# Patient Record
Sex: FEMALE | Race: WHITE | NOT HISPANIC OR LATINO | Employment: OTHER | URBAN - METROPOLITAN AREA
[De-identification: names, ages, dates, MRNs, and addresses within clinical notes are randomized per-mention and may not be internally consistent; named-entity substitution may affect disease eponyms.]

---

## 2017-01-02 ENCOUNTER — TELEPHONE (OUTPATIENT)
Dept: FAMILY MEDICINE | Facility: CLINIC | Age: 62
End: 2017-01-02

## 2017-01-02 DIAGNOSIS — R30.0 DYSURIA: ICD-10-CM

## 2017-01-02 DIAGNOSIS — R31.0 GROSS HEMATURIA: ICD-10-CM

## 2017-01-02 DIAGNOSIS — R30.0 DYSURIA: Primary | ICD-10-CM

## 2017-01-02 LAB
ALBUMIN UR-MCNC: >=300 MG/DL
APPEARANCE UR: ABNORMAL
BILIRUB UR QL STRIP: NEGATIVE
COLOR UR AUTO: ABNORMAL
GLUCOSE UR STRIP-MCNC: NEGATIVE MG/DL
HGB UR QL STRIP: ABNORMAL
KETONES UR STRIP-MCNC: NEGATIVE MG/DL
LEUKOCYTE ESTERASE UR QL STRIP: ABNORMAL
NITRATE UR QL: NEGATIVE
PH UR STRIP: 5.5 PH (ref 5–7)
RBC #/AREA URNS AUTO: >100 /HPF (ref 0–2)
SP GR UR STRIP: 1.01 (ref 1–1.03)
URN SPEC COLLECT METH UR: ABNORMAL
UROBILINOGEN UR STRIP-ACNC: 0.2 EU/DL (ref 0.2–1)
WBC #/AREA URNS AUTO: ABNORMAL /HPF (ref 0–2)

## 2017-01-02 PROCEDURE — 87088 URINE BACTERIA CULTURE: CPT | Mod: 90 | Performed by: FAMILY MEDICINE

## 2017-01-02 PROCEDURE — 87186 SC STD MICRODIL/AGAR DIL: CPT | Mod: 90 | Performed by: FAMILY MEDICINE

## 2017-01-02 PROCEDURE — 99000 SPECIMEN HANDLING OFFICE-LAB: CPT | Performed by: FAMILY MEDICINE

## 2017-01-02 PROCEDURE — 87086 URINE CULTURE/COLONY COUNT: CPT | Performed by: FAMILY MEDICINE

## 2017-01-02 PROCEDURE — 81001 URINALYSIS AUTO W/SCOPE: CPT | Performed by: FAMILY MEDICINE

## 2017-01-02 RX ORDER — CIPROFLOXACIN 500 MG/1
500 TABLET, FILM COATED ORAL 2 TIMES DAILY
Qty: 20 TABLET | Refills: 0 | Status: SHIPPED | OUTPATIENT
Start: 2017-01-02 | End: 2017-01-12

## 2017-01-02 NOTE — TELEPHONE ENCOUNTER
Huddle with TS. Advised not many white blood cells in urine to possibly note infection, but large amount of hematuria which could represent a kidney stone.    Advised that pt noted this seemed to be bladder related per triage call previously but triage can ask pt upon call back. OK to order CT abd pelvis w/wo contrast to r/o stones if desired.    Left message with female for pt to call back and speak to triage nurse.  Lizeth Fuentes RN

## 2017-01-02 NOTE — TELEPHONE ENCOUNTER
URINARY TRACT SYMPTOMS     Onset: this     Description:   Painful urination (Dysuria): YES  Blood in urine (Hematuria): YES  Delay in urine (Hesitency): no     Intensity: moderate    Progression of Symptoms:  worsening    Accompanying Signs & Symptoms:  Fever/chills: no   Flank pain no   Nausea and vomiting: no   Any vaginal symptoms: none  Abdominal/Pelvic Pain: YES, slight with pressure   History:   History of frequent UTI's: YES  History of kidney stones: no   Sexually Active: YES  Possibility of pregnancy: No    Precipitating factors:   Possibly due to sleeping in and holding urine         Therapies Tried and outcome: nothing    UA/UC ordered, lab appt made.  Lizeth Fuentes RN

## 2017-01-04 DIAGNOSIS — R30.0 DYSURIA: Primary | ICD-10-CM

## 2017-01-04 LAB
BACTERIA SPEC CULT: ABNORMAL
MICRO REPORT STATUS: ABNORMAL
MICROORGANISM SPEC CULT: ABNORMAL
SPECIMEN SOURCE: ABNORMAL

## 2017-01-30 DIAGNOSIS — R30.0 DYSURIA: ICD-10-CM

## 2017-01-30 LAB
ALBUMIN UR-MCNC: NEGATIVE MG/DL
APPEARANCE UR: CLEAR
BILIRUB UR QL STRIP: NEGATIVE
COLOR UR AUTO: YELLOW
GLUCOSE UR STRIP-MCNC: NEGATIVE MG/DL
HGB UR QL STRIP: NEGATIVE
KETONES UR STRIP-MCNC: NEGATIVE MG/DL
LEUKOCYTE ESTERASE UR QL STRIP: NEGATIVE
NITRATE UR QL: NEGATIVE
PH UR STRIP: 5.5 PH (ref 5–7)
SP GR UR STRIP: <=1.005 (ref 1–1.03)
URN SPEC COLLECT METH UR: NORMAL
UROBILINOGEN UR STRIP-ACNC: 0.2 EU/DL (ref 0.2–1)

## 2017-01-30 PROCEDURE — 81003 URINALYSIS AUTO W/O SCOPE: CPT | Performed by: FAMILY MEDICINE

## 2017-01-30 PROCEDURE — 87086 URINE CULTURE/COLONY COUNT: CPT | Performed by: FAMILY MEDICINE

## 2017-01-31 LAB
BACTERIA SPEC CULT: NO GROWTH
MICRO REPORT STATUS: NORMAL
SPECIMEN SOURCE: NORMAL

## 2017-02-22 ENCOUNTER — TELEPHONE (OUTPATIENT)
Dept: NURSING | Facility: CLINIC | Age: 62
End: 2017-02-22

## 2017-02-22 ENCOUNTER — TELEPHONE (OUTPATIENT)
Dept: FAMILY MEDICINE | Facility: CLINIC | Age: 62
End: 2017-02-22

## 2017-02-22 DIAGNOSIS — R30.0 DYSURIA: ICD-10-CM

## 2017-02-22 DIAGNOSIS — R82.90 NONSPECIFIC FINDING ON EXAMINATION OF URINE: Primary | ICD-10-CM

## 2017-02-22 DIAGNOSIS — R30.0 DYSURIA: Primary | ICD-10-CM

## 2017-02-22 LAB
ALBUMIN UR-MCNC: 100 MG/DL
APPEARANCE UR: ABNORMAL
BACTERIA #/AREA URNS HPF: ABNORMAL /HPF
BILIRUB UR QL STRIP: NEGATIVE
COLOR UR AUTO: ABNORMAL
GLUCOSE UR STRIP-MCNC: NEGATIVE MG/DL
HGB UR QL STRIP: ABNORMAL
KETONES UR STRIP-MCNC: NEGATIVE MG/DL
LEUKOCYTE ESTERASE UR QL STRIP: ABNORMAL
NITRATE UR QL: NEGATIVE
PH UR STRIP: 7 PH (ref 5–7)
RBC #/AREA URNS AUTO: >100 /HPF (ref 0–2)
SP GR UR STRIP: 1.01 (ref 1–1.03)
URN SPEC COLLECT METH UR: ABNORMAL
UROBILINOGEN UR STRIP-ACNC: 0.2 EU/DL (ref 0.2–1)
WBC #/AREA URNS AUTO: ABNORMAL /HPF (ref 0–2)

## 2017-02-22 PROCEDURE — 87086 URINE CULTURE/COLONY COUNT: CPT | Performed by: FAMILY MEDICINE

## 2017-02-22 PROCEDURE — 87186 SC STD MICRODIL/AGAR DIL: CPT | Performed by: FAMILY MEDICINE

## 2017-02-22 PROCEDURE — 87088 URINE BACTERIA CULTURE: CPT | Performed by: FAMILY MEDICINE

## 2017-02-22 PROCEDURE — 81001 URINALYSIS AUTO W/SCOPE: CPT | Performed by: FAMILY MEDICINE

## 2017-02-22 NOTE — TELEPHONE ENCOUNTER
URINARY TRACT SYMPTOMS     Onset: today    Description:   Painful urination (Dysuria): YES  Blood in urine (Hematuria): YES  Delay in urine (Hesitency): YES    Intensity: moderate    Progression of Symptoms:  worsening    Accompanying Signs & Symptoms:  Fever/chills: no   Flank pain no   Nausea and vomiting: no   Any vaginal symptoms: none  Abdominal/Pelvic Pain: no    History:   History of frequent UTI's: YES  History of kidney stones: no   Sexually Active: no   Possibility of pregnancy: No    Precipitating factors:   none         Therapies Tried and outcome:  Increase fluid intake  UA ordered and labs ordered    Mary Mckeon RN    [facility]

## 2017-02-22 NOTE — TELEPHONE ENCOUNTER
Reason for call:  Patient reporting a symptom    Symptom or request: poss UTI    Duration (how long have symptoms been present): since noon today    Have you been treated for this before? Yes    Additional comments: Pain and frequency started at noon today and now she has pink tinged urine.  Last time she was in for UTI last month her urine was bright red.    Phone Number patient can be reached at:  Home number on file [phone] (home)    Best Time:      Can we leave a detailed message on this number:  YES    Call taken on 2/22/2017 at 1:40 PM by Judi Salcido

## 2017-02-23 ENCOUNTER — TELEPHONE (OUTPATIENT)
Dept: FAMILY MEDICINE | Facility: CLINIC | Age: 62
End: 2017-02-23

## 2017-02-23 DIAGNOSIS — N39.0 URINARY TRACT INFECTION: Primary | ICD-10-CM

## 2017-02-23 RX ORDER — NITROFURANTOIN 25; 75 MG/1; MG/1
100 CAPSULE ORAL 2 TIMES DAILY
Qty: 14 CAPSULE | Refills: 0 | Status: SHIPPED | OUTPATIENT
Start: 2017-02-23 | End: 2017-04-08

## 2017-02-23 NOTE — TELEPHONE ENCOUNTER
"Call Type: Triage Call    Presenting Problem: \"I'm calling about urine results they did in  clinic today.\"  Triage Note:  Guideline Title: Information Only Call; No Symptom Triage (Adult)  Recommended Disposition: Call Provider When Office is Open  Original Inclination: Wanted to speak with a nurse  Override Disposition:  Intended Action: Follow advice given  Physician Contacted: No  Requesting information and provider is best resource; no triage required. ?  YES  Requesting regular office appointment ? NO  Sign(s) or symptom(s) associated with a diagnosed condition or with a new illness  ? NO  Requesting information about provider, services or community resources ? NO  Call back to complete assessment/clarification of information from prior caller to  complete triage ? NO  Physician Instructions:  Care Advice:  "

## 2017-02-23 NOTE — TELEPHONE ENCOUNTER
Pt stated she is miserable and would like to know what the urine turned out     Advised pt RN will discuss with the provider on plan     Best # [phone] ok ES     Huddled with MD BARBY - rubén for macrobid 100 mg BID for 7 days   Advised pt on the information above and to push fluids     Patient stated an understanding and agreed with plan.      Janet Seals RN, BSN  [facility]

## 2017-02-28 ENCOUNTER — TELEPHONE (OUTPATIENT)
Dept: FAMILY MEDICINE | Facility: CLINIC | Age: 62
End: 2017-02-28

## 2017-02-28 NOTE — TELEPHONE ENCOUNTER
URINARY TRACT SYMPTOMS     Onset: ongoing    Description:   Painful urination (Dysuria): YES  Blood in urine (Hematuria): no     Delay in urine (Hesitency): no     Intensity: moderate    Progression of Symptoms:  worsening    Accompanying Signs & Symptoms:  Fever/chills: no   Flank pain no   Nausea and vomiting: no   Any vaginal symptoms: none  Abdominal/Pelvic Pain: YES   History:   History of frequent UTI's: YES  History of kidney stones: no   Sexually Active: no   Possibility of pregnancy: No    Precipitating factors:   none         Therapies Tried and outcome: on macrobid which culture said is appropriate.  Will finish abx and call if not improved.    Mary Mckeon RN    [facility]

## 2017-02-28 NOTE — TELEPHONE ENCOUNTER
.  Name of caller:   Jahaira  Relationship to pt:  self  Reason for call:   Pt calling she is still having problems with her bladder infection  Best phone number to reach pt at is:   [phone]  Ok to leave a message with medical info?   yes  Pharmacy preferred (if calling for a refill)   [facility] [place]

## 2017-04-07 NOTE — PROGRESS NOTES
SUBJECTIVE:                                                    Kristin Jimenez is a 61 year old female who presents to clinic today for the following health issues:    URINARY TRACT SYMPTOMS     Onset: x6 days     Description:   Painful urination (Dysuria): YES- pressure/chills  Blood in urine (Hematuria): no - some red color maybe from cranberry capsules   Delay in urine (Hesitency): YES    Intensity: moderate    Progression of Symptoms:  Staying the same with capryl and d-man nos capsules ( urinary tract healer with cran action) - if I don't take these symptoms worsen     Accompanying Signs & Symptoms:  Fever/chills: YES- chills and low temp   Flank pain YES- hips and back   Nausea and vomiting: no   Any vaginal symptoms: burning slightly   Abdominal/Pelvic Pain: no    History:   History of frequent UTI's: YES  History of kidney stones: no   Sexually Active: YES  Possibility of pregnancy: No    Precipitating factors:   Unsure          Therapies Tried and outcome: capryl and d-man nos capsules - keeping symptoms moderate     Recent frequent UTIs. Had one in Jan and one in Feb. Took Cipro for Jan UTI and Macrobid for Feb UTI.   States she had a hard time tolerating Macrobid.    No history of kidney infections    Denies nausea, vomiting, or high fevers. No flank pain.  States that her hips hurt when she gets UTIs.    Lower abdominal discomfort    Frequency, urgency, dysuria, some hematuria    No chronic or episodic diarrhea  Sometimes washes vaginal area with soap  Wonders if intercourse may be a trigger    Problem list and histories reviewed & adjusted, as indicated.  Additional history: as documented    Patient Active Problem List   Diagnosis     Rosacea     Ulcerative colitis (H)     Major depressive disorder, single episode, moderate (H)     Osteopenia     Hyperlipidemia LDL goal <130     Health Care Home     Advanced directives, counseling/discussion     OA (osteoarthritis)     Family history of colon  cancer     GERD (gastroesophageal reflux disease)     Colon polyp     Past Surgical History:   Procedure Laterality Date     COLONOSCOPY  2012     HC COLONOSCOPY THRU STOMA, DIAGNOSTIC  2012,     colon polyp, diverticulosis, hemorrhoids - due ? Yrs     HC REVISE MEDIAN N/CARPAL TUNNEL SURG      bilateral - dr acuna     SURGICAL HISTORY OF -       Dr Perales - Lt hip CARMELINA     SURGICAL HISTORY OF -       Dr Perales - Rt Hip CARMELINA       Social History   Substance Use Topics     Smoking status: Never Smoker     Smokeless tobacco: Never Used     Alcohol use 1.0 oz/week     2 Standard drinks or equivalent per week      Comment: 2 glass of wine per week     Family History   Problem Relation Age of Onset     CANCER Father       Lung CA - SMOKER     Lipids Sister      Lipids Brother      CEREBROVASCULAR DISEASE Maternal Grandmother      C.A.D. Maternal Grandfather      Prostate Cancer Maternal Grandfather      Alzheimer Disease Paternal Grandmother      Prostate Cancer Paternal Grandfather      Eye Disorder Mother      glaucoma     Cancer - colorectal Mother      AGE 71 WITH METS liver cancer.  Oct. 2006     Eye Disorder Sister      histoplasmosis     Eye Disorder Brother      histoplasmosis         Current Outpatient Prescriptions   Medication Sig Dispense Refill     ciprofloxacin (CIPRO) 500 MG tablet Take 1 tablet (500 mg) by mouth 2 times daily 14 tablet 0     FLUoxetine (PROZAC) 20 MG capsule Take 3 capsules (60 mg) by mouth daily 270 capsule 3     omeprazole (PRILOSEC) 20 MG capsule TAKE ONE CAPSULE BY MOUTH ONCE DAILY 90 capsule 3     metroNIDAZOLE (METROGEL) 0.75 % gel Apply topically 2 times daily 135 g 3     Allergies   Allergen Reactions     Sulfa Drugs      Rash       Reviewed and updated as needed this visit by clinical staff       Reviewed and updated as needed this visit by Provider         ROS:  Constitutional, HEENT, cardiovascular, pulmonary, gi and gu systems  "are negative, except as otherwise noted.    OBJECTIVE:                                                    /84  Pulse 73  Temp 98.9  F (37.2  C) (Oral)  Ht 5' 3\" (1.6 m)  Wt 191 lb (86.6 kg)  LMP 10/20/2003  SpO2 99%  BMI 33.83 kg/m2  Body mass index is 33.83 kg/(m^2).  GENERAL: healthy, alert and no distress  RESP: lungs clear to auscultation - no rales, rhonchi or wheezes  CV: regular rate and rhythm, normal S1 S2, no S3 or S4, no murmur, click or rub, no peripheral edema and peripheral pulses strong  ABDOMEN: tenderness suprapubic and bowel sounds normal  BACK: no CVA tenderness, no paralumbar tenderness    Diagnostic Test Results:  Results for orders placed or performed in visit on 04/08/17 (from the past 24 hour(s))   *UA reflex to Microscopic and Culture ([place] and [facility] (except [place] and [place])   Result Value Ref Range    Color Urine Yellow     Appearance Urine Clear     Glucose Urine Negative NEG mg/dL    Bilirubin Urine Negative NEG    Ketones Urine Negative NEG mg/dL    Specific Gravity Urine 1.015 1.003 - 1.035    Blood Urine Large (A) NEG    pH Urine 8.5 (H) 5.0 - 7.0 pH    Protein Albumin Urine Trace (A) NEG mg/dL    Urobilinogen Urine 0.2 0.2 - 1.0 EU/dL    Nitrite Urine Negative NEG    Leukocyte Esterase Urine Small (A) NEG    Source Midstream Urine    Urine Microscopic   Result Value Ref Range    WBC Urine 10-25 (A) 0 - 2 /HPF    RBC Urine  (A) 0 - 2 /HPF    Squamous Epithelial /LPF Urine Few FEW /LPF    Bacteria Urine Few (A) NEG /HPF        ASSESSMENT/PLAN:                                                      1. Urinary tract infection with hematuria, site unspecified  Recurrent UTIs. Will start Cipro. Await culture results. Push fluids. Consider prophylactic antibiotic if UTIs continue to occur. Reviewed ways to prevent UTIs, including urinating before and after intercourse, and wiping from front to back.    2. Urinary urgency  - *UA reflex to Microscopic and " Culture ([place] and [facility] (except [place] and [place])  - Urine Microscopic  - ciprofloxacin (CIPRO) 500 MG tablet; Take 1 tablet (500 mg) by mouth 2 times daily  Dispense: 14 tablet; Refill: 0    3. Nonspecific finding on examination of urine  - Urine Culture Aerobic Bacterial    See Patient Instructions    Kaci Zheng PA-C  [facility]

## 2017-04-08 ENCOUNTER — OFFICE VISIT (OUTPATIENT)
Dept: FAMILY MEDICINE | Facility: CLINIC | Age: 62
End: 2017-04-08
Payer: COMMERCIAL

## 2017-04-08 VITALS
TEMPERATURE: 98.9 F | OXYGEN SATURATION: 99 % | BODY MASS INDEX: 33.84 KG/M2 | SYSTOLIC BLOOD PRESSURE: 108 MMHG | WEIGHT: 191 LBS | DIASTOLIC BLOOD PRESSURE: 84 MMHG | HEART RATE: 73 BPM | HEIGHT: 63 IN

## 2017-04-08 DIAGNOSIS — N39.0 URINARY TRACT INFECTION WITH HEMATURIA, SITE UNSPECIFIED: Primary | ICD-10-CM

## 2017-04-08 DIAGNOSIS — R39.15 URINARY URGENCY: ICD-10-CM

## 2017-04-08 DIAGNOSIS — R31.9 URINARY TRACT INFECTION WITH HEMATURIA, SITE UNSPECIFIED: Primary | ICD-10-CM

## 2017-04-08 DIAGNOSIS — R82.90 NONSPECIFIC FINDING ON EXAMINATION OF URINE: ICD-10-CM

## 2017-04-08 LAB
ALBUMIN UR-MCNC: ABNORMAL MG/DL
APPEARANCE UR: CLEAR
BACTERIA #/AREA URNS HPF: ABNORMAL /HPF
BILIRUB UR QL STRIP: NEGATIVE
COLOR UR AUTO: YELLOW
GLUCOSE UR STRIP-MCNC: NEGATIVE MG/DL
HGB UR QL STRIP: ABNORMAL
KETONES UR STRIP-MCNC: NEGATIVE MG/DL
LEUKOCYTE ESTERASE UR QL STRIP: ABNORMAL
NITRATE UR QL: NEGATIVE
NON-SQ EPI CELLS #/AREA URNS LPF: ABNORMAL /LPF
PH UR STRIP: 8.5 PH (ref 5–7)
RBC #/AREA URNS AUTO: ABNORMAL /HPF (ref 0–2)
SP GR UR STRIP: 1.01 (ref 1–1.03)
URN SPEC COLLECT METH UR: ABNORMAL
UROBILINOGEN UR STRIP-ACNC: 0.2 EU/DL (ref 0.2–1)
WBC #/AREA URNS AUTO: ABNORMAL /HPF (ref 0–2)

## 2017-04-08 PROCEDURE — 87086 URINE CULTURE/COLONY COUNT: CPT | Performed by: PHYSICIAN ASSISTANT

## 2017-04-08 PROCEDURE — 81001 URINALYSIS AUTO W/SCOPE: CPT | Performed by: PHYSICIAN ASSISTANT

## 2017-04-08 PROCEDURE — 99213 OFFICE O/P EST LOW 20 MIN: CPT | Performed by: PHYSICIAN ASSISTANT

## 2017-04-08 RX ORDER — CIPROFLOXACIN 500 MG/1
500 TABLET, FILM COATED ORAL 2 TIMES DAILY
Qty: 14 TABLET | Refills: 0 | Status: SHIPPED | OUTPATIENT
Start: 2017-04-08 | End: 2017-05-06

## 2017-04-08 NOTE — MR AVS SNAPSHOT
"              After Visit Summary   4/8/2017    Kristin Jimenez    MRN: [id number]           Patient Information     Date Of Birth          1955        Visit Information        Provider Department      4/8/2017 9:00 AM Kaci Zheng PA-C [facility]        Today's Diagnoses     Urinary urgency    -  1    Nonspecific finding on examination of urine           Follow-ups after your visit        Who to contact     If you have questions or need follow up information about today's clinic visit or your schedule please contact [facility] directly at [phone].  Normal or non-critical lab and imaging results will be communicated to you by mywaveshart, letter or phone within 4 business days after the clinic has received the results. If you do not hear from us within 7 days, please contact the clinic through [employer] or phone. If you have a critical or abnormal lab result, we will notify you by phone as soon as possible.  Submit refill requests through [employer] or call your pharmacy and they will forward the refill request to us. Please allow 3 business days for your refill to be completed.          Additional Information About Your Visit        MyChart Information     [employer] gives you secure access to your electronic health record. If you see a primary care provider, you can also send messages to your care team and make appointments. If you have questions, please call your primary care clinic.  If you do not have a primary care provider, please call [phone] and they will assist you.        Care EveryWhere ID     This is your Care EveryWhere ID. This could be used by other organizations to access your [facility] medical records  [id number]        Your Vitals Were     Pulse Temperature Height Last Period Pulse Oximetry BMI (Body Mass Index)    73 98.9  F (37.2  C) (Oral) 5' 3\" (1.6 m) 10/20/2003 99% 33.83 kg/m2       Blood Pressure from Last 3 Encounters:   04/08/17 108/84 "   12/26/16 110/72   12/12/16 112/74    Weight from Last 3 Encounters:   04/08/17 191 lb (86.6 kg)   12/26/16 186 lb (84.4 kg)   12/12/16 187 lb (84.8 kg)              We Performed the Following     *UA reflex to Microscopic and Culture ([place] and [facility] (except [place] and [place])     Urine Culture Aerobic Bacterial     Urine Microscopic          Today's Medication Changes          These changes are accurate as of: 4/8/17  9:27 AM.  If you have any questions, ask your nurse or doctor.               Start taking these medicines.        Dose/Directions    ciprofloxacin 500 MG tablet   Commonly known as:  CIPRO   Used for:  Urinary urgency   Started by:  Kaci Zheng PA-C        Dose:  500 mg   Take 1 tablet (500 mg) by mouth 2 times daily   Quantity:  14 tablet   Refills:  0            Where to get your medicines      These medications were sent to [facility] [place] [address]     Phone:  [phone]     ciprofloxacin 500 MG tablet                Primary Care Provider Office Phone # Fax #    Louis Nixon [phone]       [place] [id number]        Thank you!     Thank you for choosing [facility]  for your care. Our goal is always to provide you with excellent care. Hearing back from our patients is one way we can continue to improve our services. Please take a few minutes to complete the written survey that you may receive in the mail after your visit with us. Thank you!             Your Updated Medication List - Protect others around you: Learn how to safely use, store and throw away your medicines at www.disposemymeds.org.          This list is accurate as of: 4/8/17  9:27 AM.  Always use your most recent med list.                   Brand Name Dispense Instructions for use    ciprofloxacin 500 MG tablet    CIPRO    14  tablet    Take 1 tablet (500 mg) by mouth 2 times daily       FLUoxetine 20 MG capsule    PROzac    270 capsule    Take 3 capsules (60 mg) by mouth daily       metroNIDAZOLE 0.75 % topical gel    METROGEL    135 g    Apply topically 2 times daily       omeprazole 20 MG CR capsule    priLOSEC    90 capsule    TAKE ONE CAPSULE BY MOUTH ONCE DAILY

## 2017-04-08 NOTE — ASSESSMENT & PLAN NOTE
Advance Care Planning 4/8/2017: ACP Review of Chart / Resources Provided:  Reviewed chart for advance care plan.  Kristin Jimenez has no plan or code status on file. Discussed available resources . Patient declined   Added by Pao Zavala

## 2017-04-08 NOTE — NURSING NOTE
"Chief Complaint   Patient presents with     Vaginal Problem       Initial /84  Pulse 73  Temp 98.9  F (37.2  C) (Oral)  Ht 5' 3\" (1.6 m)  Wt 191 lb (86.6 kg)  LMP 10/20/2003  SpO2 99%  BMI 33.83 kg/m2 Estimated body mass index is 33.83 kg/(m^2) as calculated from the following:    Height as of this encounter: 5' 3\" (1.6 m).    Weight as of this encounter: 191 lb (86.6 kg).  Medication Reconciliation: complete   Pao Zavala Medical Assistant      "

## 2017-04-10 LAB
BACTERIA SPEC CULT: NORMAL
MICRO REPORT STATUS: NORMAL
SPECIMEN SOURCE: NORMAL

## 2017-04-12 NOTE — PROGRESS NOTES
Dear Jahaira,    Your urine culture grew out some mixed bacteria. This often indicates contamination with normal urogenital bacteria. Based on your symptoms, I would advise completing the full course of the antibiotic. If you have any persistent symptoms after finishing the antibiotic, a follow-up visit would be advised.    Please contact the clinic if you have additional questions.  Thank you.    Sincerely,    Kaci Zheng PA-C

## 2017-05-06 ENCOUNTER — OFFICE VISIT (OUTPATIENT)
Dept: FAMILY MEDICINE | Facility: CLINIC | Age: 62
End: 2017-05-06
Payer: COMMERCIAL

## 2017-05-06 VITALS
BODY MASS INDEX: 34.02 KG/M2 | HEART RATE: 81 BPM | WEIGHT: 192 LBS | HEIGHT: 63 IN | SYSTOLIC BLOOD PRESSURE: 126 MMHG | TEMPERATURE: 97.9 F | OXYGEN SATURATION: 99 % | DIASTOLIC BLOOD PRESSURE: 80 MMHG

## 2017-05-06 DIAGNOSIS — R30.0 DYSURIA: Primary | ICD-10-CM

## 2017-05-06 LAB
ALBUMIN UR-MCNC: NEGATIVE MG/DL
APPEARANCE UR: CLEAR
BACTERIA #/AREA URNS HPF: ABNORMAL /HPF
BILIRUB UR QL STRIP: NEGATIVE
COLOR UR AUTO: YELLOW
GLUCOSE UR STRIP-MCNC: NEGATIVE MG/DL
HGB UR QL STRIP: ABNORMAL
KETONES UR STRIP-MCNC: NEGATIVE MG/DL
LEUKOCYTE ESTERASE UR QL STRIP: NEGATIVE
MICRO REPORT STATUS: NORMAL
NITRATE UR QL: NEGATIVE
PH UR STRIP: 7 PH (ref 5–7)
RBC #/AREA URNS AUTO: ABNORMAL /HPF (ref 0–2)
SP GR UR STRIP: 1.01 (ref 1–1.03)
SPECIMEN SOURCE: NORMAL
URN SPEC COLLECT METH UR: ABNORMAL
UROBILINOGEN UR STRIP-ACNC: 0.2 EU/DL (ref 0.2–1)
WBC #/AREA URNS AUTO: ABNORMAL /HPF (ref 0–2)
WET PREP SPEC: NORMAL

## 2017-05-06 PROCEDURE — 87210 SMEAR WET MOUNT SALINE/INK: CPT | Performed by: PHYSICIAN ASSISTANT

## 2017-05-06 PROCEDURE — 87086 URINE CULTURE/COLONY COUNT: CPT | Performed by: PHYSICIAN ASSISTANT

## 2017-05-06 PROCEDURE — 81001 URINALYSIS AUTO W/SCOPE: CPT | Performed by: PHYSICIAN ASSISTANT

## 2017-05-06 PROCEDURE — 99213 OFFICE O/P EST LOW 20 MIN: CPT | Performed by: PHYSICIAN ASSISTANT

## 2017-05-06 NOTE — MR AVS SNAPSHOT
After Visit Summary   5/6/2017    Kristin Jimenez    MRN: [id number]           Patient Information     Date Of Birth          1955        Visit Information        Provider Department      5/6/2017 11:40 AM Miriam Matias PA-C [facility]        Today's Diagnoses     Dysuria    -  1      Care Instructions    Please followup with urology.      Please followup sooner (ER UC if needed), if symptoms change or worsen in any way.          Follow-ups after your visit        Additional Services     UROLOGY ADULT REFERRAL       Your provider has referred you to: [facility]: [facility] [phone]   [email or web address]    Please be aware that coverage of these services is subject to the terms and limitations of your health insurance plan.  Call member services at your health plan with any benefit or coverage questions.      Please bring the following with you to your appointment:    (1) Any X-Rays, CTs or MRIs which have been performed.  Contact the facility where they were done to arrange for  prior to your scheduled appointment.    (2) List of current medications  (3) This referral request   (4) Any documents/labs given to you for this referral                  Who to contact     If you have questions or need follow up information about today's clinic visit or your schedule please contact [facility] directly at [phone].  Normal or non-critical lab and imaging results will be communicated to you by MyChart, letter or phone within 4 business days after the clinic has received the results. If you do not hear from us within 7 days, please contact the clinic through MyChart or phone. If you have a critical or abnormal lab result, we will notify you by phone as soon as possible.  Submit refill requests through [employer] or call your pharmacy and they will forward the refill request to us. Please  "allow 3 business days for your refill to be completed.          Additional Information About Your Visit        MyChart Information     [employer] gives you secure access to your electronic health record. If you see a primary care provider, you can also send messages to your care team and make appointments. If you have questions, please call your primary care clinic.  If you do not have a primary care provider, please call [phone] and they will assist you.        Care EveryWhere ID     This is your Care EveryWhere ID. This could be used by other organizations to access your [place] medical records  [id number]        Your Vitals Were     Pulse Temperature Height Last Period Pulse Oximetry BMI (Body Mass Index)    81 97.9  F (36.6  C) (Oral) 5' 3\" (1.6 m) 10/20/2003 99% 34.01 kg/m2       Blood Pressure from Last 3 Encounters:   05/06/17 126/80   04/08/17 108/84   12/26/16 110/72    Weight from Last 3 Encounters:   05/06/17 192 lb (87.1 kg)   04/08/17 191 lb (86.6 kg)   12/26/16 186 lb (84.4 kg)              We Performed the Following     UA reflex to Microscopic and Culture     Urine Culture Aerobic Bacterial     Urine Microscopic     UROLOGY ADULT REFERRAL     Wet prep        Primary Care Provider Office Phone # Fax #    Louis Nixon [phone]       [facility] [address] [place] [id number]        Thank you!     Thank you for choosing [facility]  for your care. Our goal is always to provide you with excellent care. Hearing back from our patients is one way we can continue to improve our services. Please take a few minutes to complete the written survey that you may receive in the mail after your visit with us. Thank you!             Your Updated Medication List - Protect others around you: Learn how to safely use, store and throw away your medicines at www.disposemymeds.org.          This list is accurate as of: 5/6/17 12:23 PM.  Always use your most " recent med list.                   Brand Name Dispense Instructions for use    FLUoxetine 20 MG capsule    PROzac    270 capsule    Take 3 capsules (60 mg) by mouth daily       metroNIDAZOLE 0.75 % topical gel    METROGEL    135 g    Apply topically 2 times daily       omeprazole 20 MG CR capsule    priLOSEC    90 capsule    TAKE ONE CAPSULE BY MOUTH ONCE DAILY

## 2017-05-06 NOTE — PROGRESS NOTES
"  SUBJECTIVE:                                                    Kristin Jimenez is a 61 year old female who presents to clinic today for the following health issues:    URINARY TRACT SYMPTOMS     Onset: 2 days    Description:   Painful urination (Dysuria): YES  Blood in urine (Hematuria): no   Delay in urine (Hesitency): YES    Intensity: moderate    Progression of Symptoms:  worsening    Accompanying Signs & Symptoms:  Fever/chills: YES  Flank pain no   Nausea and vomiting: no   Any vaginal symptoms: little odar  Abdominal/Pelvic Pain: YES   History:   History of frequent UTI's: YES  History of kidney stones: no   Sexually Active: YES  Possibility of pregnancy: No    Precipitating factors:   none         Therapies Tried and outcome: nothing      Patient reports abdominal burning with urination and some frequency.  She did have a bladder infection last month, and was given antibiotics.  She did take the full antibiotic course, but did not followup for a urine to be sure it was cleared due to the cost.          She denies fevers.  No nausea or vomiting.    She denies back pain.      Problem list and histories reviewed & adjusted, as indicated.  Additional history: as documented    Reviewed and updated as needed this visit by clinical staff       Reviewed and updated as needed this visit by Provider         ROS:  Constitutional, HEENT, cardiovascular, pulmonary, GI, , musculoskeletal, neuro, skin, endocrine and psych systems are negative, except as otherwise noted.    OBJECTIVE:                                                    /80  Pulse 81  Temp 97.9  F (36.6  C) (Oral)  Ht 5' 3\" (1.6 m)  Wt 192 lb (87.1 kg)  LMP 10/20/2003  SpO2 99%  BMI 34.01 kg/m2  Body mass index is 34.01 kg/(m^2).  GENERAL: healthy, alert and no distress  EYES: Eyes grossly normal to inspection, PERRL and conjunctivae and sclerae normal  NECK: no adenopathy, no asymmetry, masses, or scars and thyroid normal to " "palpation  RESP: lungs clear to auscultation - no rales, rhonchi or wheezes  CV: regular rate and rhythm, normal S1 S2, no S3 or S4, no murmur, click or rub, no peripheral edema and peripheral pulses strong  ABDOMEN: soft, nontender, no hepatosplenomegaly, no masses and bowel sounds normal  MS: no gross musculoskeletal defects noted, no edema  NEURO: Normal strength and tone, mentation intact and speech normal  PSYCH: mentation appears normal, affect normal/bright    Diagnostic Test Results:  Wet Prep - Negative  Urinalysis - unremarkable     ASSESSMENT/PLAN:                                                      1. Dysuria    - UA reflex to Microscopic and Culture  - Urine Microscopic  - UROLOGY ADULT REFERRAL  - Urine Culture Aerobic Bacterial  - Wet prep    - Patient reports that symptoms are mild, but she is worried because she has had a bladder infections recently.    - Urine culture from 04.08.2017 infection did not show a true UTI.    - Patient states \"I might be getting in my head about symptoms.\"  - Patient advised to follow-up if symptoms change or worsen.   - Referral for urology done for further follow-up of symptoms.    See Patient Instructions    Miriam Matias PA-C  [facility] [place]  "

## 2017-05-06 NOTE — PATIENT INSTRUCTIONS
Please followup with urology.      Please followup sooner (ER UC if needed), if symptoms change or worsen in any way.

## 2017-05-06 NOTE — NURSING NOTE
"Chief Complaint   Patient presents with     Urinary Problem       Initial /80  Pulse 81  Temp 97.9  F (36.6  C) (Oral)  Ht 5' 3\" (1.6 m)  Wt 192 lb (87.1 kg)  LMP 10/20/2003  SpO2 99%  BMI 34.01 kg/m2 Estimated body mass index is 34.01 kg/(m^2) as calculated from the following:    Height as of this encounter: 5' 3\" (1.6 m).    Weight as of this encounter: 192 lb (87.1 kg)..  BP completed using cuff size: jf Briceño MA  "

## 2017-05-08 LAB
BACTERIA SPEC CULT: NO GROWTH
MICRO REPORT STATUS: NORMAL
SPECIMEN SOURCE: NORMAL

## 2017-06-09 DIAGNOSIS — K21.9 GASTROESOPHAGEAL REFLUX DISEASE WITHOUT ESOPHAGITIS: ICD-10-CM

## 2017-06-09 NOTE — TELEPHONE ENCOUNTER
omeprazole (PRILOSEC) 20 MG capsule      Last Written Prescription Date: 6/4/2016  Last Fill Quantity: 90 capsule,  # refills: 3   Last Office Visit with [facility], [facility] or [facility] prescribing provider: 5/6/2017

## 2017-06-17 ENCOUNTER — HEALTH MAINTENANCE LETTER (OUTPATIENT)
Age: 62
End: 2017-06-17

## 2017-07-06 ENCOUNTER — OFFICE VISIT (OUTPATIENT)
Dept: FAMILY MEDICINE | Facility: CLINIC | Age: 62
End: 2017-07-06
Payer: COMMERCIAL

## 2017-07-06 VITALS
HEIGHT: 63 IN | DIASTOLIC BLOOD PRESSURE: 65 MMHG | HEART RATE: 88 BPM | SYSTOLIC BLOOD PRESSURE: 115 MMHG | TEMPERATURE: 98.2 F | BODY MASS INDEX: 33.35 KG/M2 | OXYGEN SATURATION: 99 % | WEIGHT: 188.2 LBS

## 2017-07-06 DIAGNOSIS — K51.30 ULCERATIVE RECTOSIGMOIDITIS WITHOUT COMPLICATION (H): ICD-10-CM

## 2017-07-06 DIAGNOSIS — J01.00 ACUTE NON-RECURRENT MAXILLARY SINUSITIS: Primary | ICD-10-CM

## 2017-07-06 DIAGNOSIS — Z71.89 ADVANCED DIRECTIVES, COUNSELING/DISCUSSION: ICD-10-CM

## 2017-07-06 DIAGNOSIS — Z12.31 VISIT FOR SCREENING MAMMOGRAM: ICD-10-CM

## 2017-07-06 PROCEDURE — 99213 OFFICE O/P EST LOW 20 MIN: CPT | Performed by: PHYSICIAN ASSISTANT

## 2017-07-06 ASSESSMENT — ANXIETY QUESTIONNAIRES
7. FEELING AFRAID AS IF SOMETHING AWFUL MIGHT HAPPEN: NOT AT ALL
GAD7 TOTAL SCORE: 0
5. BEING SO RESTLESS THAT IT IS HARD TO SIT STILL: NOT AT ALL
2. NOT BEING ABLE TO STOP OR CONTROL WORRYING: NOT AT ALL
3. WORRYING TOO MUCH ABOUT DIFFERENT THINGS: NOT AT ALL
6. BECOMING EASILY ANNOYED OR IRRITABLE: NOT AT ALL
1. FEELING NERVOUS, ANXIOUS, OR ON EDGE: NOT AT ALL

## 2017-07-06 ASSESSMENT — PATIENT HEALTH QUESTIONNAIRE - PHQ9: 5. POOR APPETITE OR OVEREATING: NOT AT ALL

## 2017-07-06 NOTE — ASSESSMENT & PLAN NOTE
Advance Care Planning 7/6/2017: ACP Review of Chart / Resources Provided:  Reviewed chart for advance care plan.  Kristin Jimenez has no plan or code status on file. Discussed available resources and provided with information.   Added by Lindsay Alves

## 2017-07-06 NOTE — MR AVS SNAPSHOT
After Visit Summary   7/6/2017    Kristin Jimenez    MRN: [id number]           Patient Information     Date Of Birth          1955        Visit Information        Provider Department      7/6/2017 2:00 PM Madyson Parada PA-C [facility]        Today's Diagnoses     Acute non-recurrent maxillary sinusitis    -  1    Visit for screening mammogram        Advanced directives, counseling/discussion           Follow-ups after your visit        Follow-up notes from your care team     Return for Physical Exam w/ PAP.      Future tests that were ordered for you today     Open Future Orders        Priority Expected Expires Ordered    *MA Screening Digital Bilateral Routine  7/6/2018 7/6/2017            Who to contact     If you have questions or need follow up information about today's clinic visit or your schedule please contact [facility] directly at [phone].  Normal or non-critical lab and imaging results will be communicated to you by Dipityhart, letter or phone within 4 business days after the clinic has received the results. If you do not hear from us within 7 days, please contact the clinic through Dipityhart or phone. If you have a critical or abnormal lab result, we will notify you by phone as soon as possible.  Submit refill requests through PoolCubes or call your pharmacy and they will forward the refill request to us. Please allow 3 business days for your refill to be completed.          Additional Information About Your Visit        MyChart Information     PoolCubes gives you secure access to your electronic health record. If you see a primary care provider, you can also send messages to your care team and make appointments. If you have questions, please call your primary care clinic.  If you do not have a primary care provider, please call [phone] and they will assist you.        Care EveryWhere ID     This is your Care EveryWhere ID. This could be  "used by other organizations to access your [facility] medical records  [id number]        Your Vitals Were     Pulse Temperature Height Last Period Pulse Oximetry Breastfeeding?    88 98.2  F (36.8  C) (Oral) 5' 3\" (1.6 m) 10/20/2003 99% No    BMI (Body Mass Index)                   33.34 kg/m2            Blood Pressure from Last 3 Encounters:   07/06/17 115/65   05/06/17 126/80   04/08/17 108/84    Weight from Last 3 Encounters:   07/06/17 188 lb 3.2 oz (85.4 kg)   05/06/17 192 lb (87.1 kg)   04/08/17 191 lb (86.6 kg)                 Today's Medication Changes          These changes are accurate as of: 7/6/17  2:38 PM.  If you have any questions, ask your nurse or doctor.               Start taking these medicines.        Dose/Directions    amoxicillin-clavulanate 875-125 MG per tablet   Commonly known as:  AUGMENTIN   Used for:  Acute non-recurrent maxillary sinusitis   Started by:  Madyson Parada PA-C        Dose:  1 tablet   Take 1 tablet by mouth 2 times daily for 10 days   Quantity:  20 tablet   Refills:  0            Where to get your medicines      These medications were sent to [facility] [place] [address]     Phone:  [phone]     amoxicillin-clavulanate 875-125 MG per tablet                Primary Care Provider Office Phone # Fax #    Louis Nixon [phone]       [place] [id number]        Equal Access to Services     [facility] [place] AH: Hadii yolanda dotson hadasho Soomaali, waaxda luqadaha, qaybta kaalmada adeegyada, margaret kim. So [facility] [phone].    ATENCIÓN: Si habla español, tiene a meyers disposición servicios gratuitos de asistencia lingüística. Llame al [phone].    We comply with applicable federal civil rights laws and [place] laws. We do not discriminate on the basis of race, color, national origin, age, " disability sex, sexual orientation or gender identity.            Thank you!     Thank you for choosing [facility]  for your care. Our goal is always to provide you with excellent care. Hearing back from our patients is one way we can continue to improve our services. Please take a few minutes to complete the written survey that you may receive in the mail after your visit with us. Thank you!             Your Updated Medication List - Protect others around you: Learn how to safely use, store and throw away your medicines at www.disposemymeds.org.          This list is accurate as of: 7/6/17  2:38 PM.  Always use your most recent med list.                   Brand Name Dispense Instructions for use Diagnosis    amoxicillin-clavulanate 875-125 MG per tablet    AUGMENTIN    20 tablet    Take 1 tablet by mouth 2 times daily for 10 days    Acute non-recurrent maxillary sinusitis       FLUoxetine 20 MG capsule    PROzac    270 capsule    Take 3 capsules (60 mg) by mouth daily    Major depressive disorder, single episode, moderate (H)       metroNIDAZOLE 0.75 % topical gel    METROGEL    135 g    Apply topically 2 times daily    Rosacea, Routine general medical examination at a health care facility       omeprazole 20 MG CR capsule    priLOSEC    90 capsule    TAKE ONE CAPSULE BY MOUTH ONCE DAILY    Gastroesophageal reflux disease without esophagitis

## 2017-07-06 NOTE — NURSING NOTE
"Chief Complaint   Patient presents with     Sinus Problem       Initial /65 (BP Location: Left arm, Patient Position: Chair, Cuff Size: Adult Large)  Pulse 88  Temp 98.2  F (36.8  C) (Oral)  Ht 5' 3\" (1.6 m)  Wt 188 lb 3.2 oz (85.4 kg)  LMP 10/20/2003  SpO2 99%  Breastfeeding? No  BMI 33.34 kg/m2 Estimated body mass index is 33.34 kg/(m^2) as calculated from the following:    Height as of this encounter: 5' 3\" (1.6 m).    Weight as of this encounter: 188 lb 3.2 oz (85.4 kg).  Medication Reconciliation: complete   Lindsay Alves MA     "

## 2017-07-06 NOTE — PROGRESS NOTES
SUBJECTIVE:                                                    Kristin Jimenez is a 61 year old female who presents to clinic today for the following health issues:      Concern - Sinus symptoms  Patient presents with 7 days of constant drainage in the back of her throat radiating to the back of her throat with associated cough, green/bloody mucous, voice loss, throat pain, sinus pressure, tooth pain and sneezing. She has tried mucinex and ibuprofen for treatment, which may have helped a little. She denies any alleviating or exacerbating factors. She reports previous history of similar symptoms. She denies any fever, muscle aches, shortness of breath, wheezing, or any related symptoms or complaints. 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     Problem list and histories reviewed & adjusted, as indicated.  Additional history: none    Patient Active Problem List   Diagnosis     Rosacea     Ulcerative colitis (H)     Major depressive disorder, single episode, moderate (H)     Osteopenia     Hyperlipidemia LDL goal <130     Health Care Home     Advanced directives, counseling/discussion     OA (osteoarthritis)     Family history of colon cancer     GERD (gastroesophageal reflux disease)     Colon polyp     Ulcerative rectosigmoiditis without complication (H)     Past Surgical History:   Procedure Laterality Date     COLONOSCOPY  8/16/2012     HC COLONOSCOPY THRU STOMA, DIAGNOSTIC  4/07, 2012, 12/16    colon polyp, diverticulosis, hemorrhoids - due ? Yrs     HC REVISE MEDIAN N/CARPAL TUNNEL SURG  12/02    bilateral - dr acuna     SURGICAL HISTORY OF -   6/07    Dr Perales - Lt hip CARMELINA     SURGICAL HISTORY OF -   6/09    Dr Perales - Rt Hip CARMELINA       Social History   Substance Use Topics     Smoking status: Never Smoker     Smokeless tobacco: Never Used     Alcohol use 1.0 oz/week     2 Standard drinks or equivalent per week      Comment: 2 glass of wine per week     Family  "History   Problem Relation Age of Onset     CANCER Father       Lung CA - SMOKER     Lipids Sister      Lipids Brother      CEREBROVASCULAR DISEASE Maternal Grandmother      C.A.D. Maternal Grandfather      Prostate Cancer Maternal Grandfather      Alzheimer Disease Paternal Grandmother      Prostate Cancer Paternal Grandfather      Eye Disorder Mother      glaucoma     Cancer - colorectal Mother      AGE 71 WITH METS liver cancer.  Oct. 2006     Eye Disorder Sister      histoplasmosis     Eye Disorder Brother      histoplasmosis         Current Outpatient Prescriptions   Medication Sig Dispense Refill     amoxicillin-clavulanate (AUGMENTIN) 875-125 MG per tablet Take 1 tablet by mouth 2 times daily for 10 days 20 tablet 0     omeprazole (PRILOSEC) 20 MG CR capsule TAKE ONE CAPSULE BY MOUTH ONCE DAILY 90 capsule 3     FLUoxetine (PROZAC) 20 MG capsule Take 3 capsules (60 mg) by mouth daily 270 capsule 3     metroNIDAZOLE (METROGEL) 0.75 % gel Apply topically 2 times daily 135 g 3     Allergies   Allergen Reactions     Sulfa Drugs      Rash     Labs reviewed in EPIC    ROS:  Constitutional, HEENT, cardiovascular, pulmonary, GI, , musculoskeletal, neuro, skin, endocrine and psych systems are negative, except as otherwise noted.    OBJECTIVE:     /65 (BP Location: Left arm, Patient Position: Chair, Cuff Size: Adult Large)  Pulse 88  Temp 98.2  F (36.8  C) (Oral)  Ht 5' 3\" (1.6 m)  Wt 188 lb 3.2 oz (85.4 kg)  LMP 10/20/2003  SpO2 99%  Breastfeeding? No  BMI 33.34 kg/m2  Body mass index is 33.34 kg/(m^2).  GENERAL: healthy, alert and no distress  HENT: Erythema in the posterior pharynx. Tenderness to palpation of maxillary and frontal sinuses. Ear canals and TM's normal, nose and mouth without ulcers or lesions  NECK: no adenopathy, no asymmetry, masses, or scars and thyroid normal to palpation  RESP: lungs clear to auscultation - no rales, rhonchi or wheezes  CV: regular rate and rhythm, normal " S1 S2, no S3 or S4, no murmur, click or rub, no peripheral edema and peripheral pulses strong  MS: no gross musculoskeletal defects noted, no edema  NEURO: Normal strength and tone, mentation intact and speech normal  PSYCH: mentation appears normal, affect normal/bright    PHQ-9 SCORE 12/26/2016   Total Score -   Total Score 0     KRISTI-7 SCORE 2/10/2015 6/6/2016 7/6/2017   Total Score 0 - -   Total Score - 0 0       Diagnostic Test Results:  none    ASSESSMENT/PLAN:     Kristin was seen today for sinus problem.    Diagnoses and all orders for this visit:    Acute non-recurrent maxillary sinusitis  -     amoxicillin-clavulanate (AUGMENTIN) 875-125 MG per tablet; Take 1 tablet by mouth 2 times daily for 10 days    Visit for screening mammogram  -     *MA Screening Digital Bilateral; Future      Follow up with PCP for mammogram.      Madyson Parada PA-C  [facility] [place]

## 2017-07-07 ASSESSMENT — ANXIETY QUESTIONNAIRES: GAD7 TOTAL SCORE: 0

## 2017-07-13 DIAGNOSIS — F32.1 MAJOR DEPRESSIVE DISORDER, SINGLE EPISODE, MODERATE (H): ICD-10-CM

## 2017-07-13 NOTE — TELEPHONE ENCOUNTER
Fluoxetine HCL 20MG     Last Written Prescription Date: 6/4/17  Last Fill Quantity: 270, # refills: 3  Last Office Visit with [facility] primary care provider:  7/6/17        Last PHQ-9 score on record=   PHQ-9 SCORE 12/26/2016   Total Score -   Total Score 0

## 2017-07-19 ENCOUNTER — OFFICE VISIT (OUTPATIENT)
Dept: URGENT CARE | Facility: URGENT CARE | Age: 62
End: 2017-07-19
Payer: COMMERCIAL

## 2017-07-19 VITALS
DIASTOLIC BLOOD PRESSURE: 74 MMHG | OXYGEN SATURATION: 99 % | TEMPERATURE: 97.9 F | SYSTOLIC BLOOD PRESSURE: 120 MMHG | WEIGHT: 188 LBS | HEART RATE: 80 BPM | RESPIRATION RATE: 18 BRPM | BODY MASS INDEX: 33.3 KG/M2

## 2017-07-19 DIAGNOSIS — N30.01 ACUTE CYSTITIS WITH HEMATURIA: ICD-10-CM

## 2017-07-19 DIAGNOSIS — R31.9 BLOOD IN URINE: Primary | ICD-10-CM

## 2017-07-19 DIAGNOSIS — R82.90 NONSPECIFIC FINDING ON EXAMINATION OF URINE: ICD-10-CM

## 2017-07-19 LAB
ALBUMIN UR-MCNC: 100 MG/DL
APPEARANCE UR: ABNORMAL
BACTERIA #/AREA URNS HPF: ABNORMAL /HPF
BILIRUB UR QL STRIP: NEGATIVE
COLOR UR AUTO: ABNORMAL
GLUCOSE UR STRIP-MCNC: NEGATIVE MG/DL
HGB UR QL STRIP: ABNORMAL
KETONES UR STRIP-MCNC: NEGATIVE MG/DL
LEUKOCYTE ESTERASE UR QL STRIP: ABNORMAL
NITRATE UR QL: NEGATIVE
NON-SQ EPI CELLS #/AREA URNS LPF: ABNORMAL /LPF
PH UR STRIP: 6 PH (ref 5–7)
RBC #/AREA URNS AUTO: ABNORMAL /HPF (ref 0–2)
SP GR UR STRIP: 1.01 (ref 1–1.03)
URN SPEC COLLECT METH UR: ABNORMAL
UROBILINOGEN UR STRIP-ACNC: 0.2 EU/DL (ref 0.2–1)
WBC #/AREA URNS AUTO: ABNORMAL /HPF (ref 0–2)

## 2017-07-19 PROCEDURE — 87088 URINE BACTERIA CULTURE: CPT | Performed by: FAMILY MEDICINE

## 2017-07-19 PROCEDURE — 87186 SC STD MICRODIL/AGAR DIL: CPT | Performed by: FAMILY MEDICINE

## 2017-07-19 PROCEDURE — 99213 OFFICE O/P EST LOW 20 MIN: CPT | Performed by: FAMILY MEDICINE

## 2017-07-19 PROCEDURE — 81001 URINALYSIS AUTO W/SCOPE: CPT | Performed by: FAMILY MEDICINE

## 2017-07-19 PROCEDURE — 87086 URINE CULTURE/COLONY COUNT: CPT | Performed by: FAMILY MEDICINE

## 2017-07-19 RX ORDER — NITROFURANTOIN 25; 75 MG/1; MG/1
100 CAPSULE ORAL 2 TIMES DAILY
Qty: 20 CAPSULE | Refills: 0 | Status: SHIPPED | OUTPATIENT
Start: 2017-07-19 | End: 2017-07-29

## 2017-07-19 NOTE — MR AVS SNAPSHOT
After Visit Summary   7/19/2017    Kristin Jimenez    MRN: [id number]           Patient Information     Date Of Birth          1955        Visit Information        Provider Department      7/19/2017 8:00 PM Paty Pastor MD [facility]        Today's Diagnoses     Blood in urine    -  1    Nonspecific finding on examination of urine        Acute cystitis with hematuria          Care Instructions      Understanding Urinary Tract Infections (UTIs)  Most UTIs are caused by bacteria, although they may also be caused by viruses or fungi. Bacteria from the bowel are the most common source of infection. The infection may start because of any of the following:    Sexual activity. During sex, bacteria can travel from the penis, vagina, or rectum into the urethra.     Bacteria on the skin outside the rectum may travel into the urethra. This is more common in women since the rectum and urethra are closer to each other than in men. Wiping from front to back after using the toilet and keeping the area clean can help prevent germs from getting to the urethra.    Blockage of urine flow through the urinary tract. If urine sits too long, germs may start to grow out of control.      Parts of the urinary tract  The infection can occur in any part of the urinary tract.    The kidneys collect and store urine.    The ureters carry urine from the kidneys to the bladder.    The bladder holds urine until you are ready to let it out.    The urethra carries urine from the bladder out of the body. It is shorter in women, so bacteria can move through it more easily. The urethra is longer in men, so a UTI is less likely to reach the bladder or kidneys in men.  Date Last Reviewed: 1/1/2017 2000-2017 [employer]. [address] [place] [address], [place] [address]. All rights reserved. This information is not intended as a substitute for professional medical care. Always follow your healthcare  professional's instructions.                Follow-ups after your visit        Your next 10 appointments already scheduled     Aug 08, 2017  8:00 AM CDT   MA SCREENING DIGITAL BILATERAL with [facility]   [facility]    [address] [place] [id number] [phone]           Do not use any powder, lotion or deodorant under your arms or on your breast. If you do, we will ask you to remove it before your exam.  Wear comfortable, two-piece clothing.  If you have any allergies, tell your care team.  Bring any previous mammograms from other facilities or have them mailed to the breast center.              Who to contact     If you have questions or need follow up information about today's clinic visit or your schedule please contact [facility] directly at [phone].  Normal or non-critical lab and imaging results will be communicated to you by MyChart, letter or phone within 4 business days after the clinic has received the results. If you do not hear from us within 7 days, please contact the clinic through GoodPeoplehart or phone. If you have a critical or abnormal lab result, we will notify you by phone as soon as possible.  Submit refill requests through [employer] Intellect or call your pharmacy and they will forward the refill request to us. Please allow 3 business days for your refill to be completed.          Additional Information About Your Visit        MyChart Information     [employer] gives you secure access to your electronic health record. If you see a primary care provider, you can also send messages to your care team and make appointments. If you have questions, please call your primary care clinic.  If you do not have a primary care provider, please call [phone] and they will assist you.        Care EveryWhere ID     This is your Care EveryWhere ID. This could be used by other organizations to access your [facility]  records  [id number]        Your Vitals Were     Pulse Temperature Respirations Last Period Pulse Oximetry BMI (Body Mass Index)    80 97.9  F (36.6  C) (Oral) 18 10/20/2003 99% 33.3 kg/m2       Blood Pressure from Last 3 Encounters:   07/19/17 120/74   07/06/17 115/65   05/06/17 126/80    Weight from Last 3 Encounters:   07/19/17 188 lb (85.3 kg)   07/06/17 188 lb 3.2 oz (85.4 kg)   05/06/17 192 lb (87.1 kg)              We Performed the Following     *UA reflex to Microscopic and Culture ([place] and [facility] (except [place] and [place])     Urine Culture Aerobic Bacterial     Urine Microscopic          Today's Medication Changes          These changes are accurate as of: 7/19/17  8:34 PM.  If you have any questions, ask your nurse or doctor.               Start taking these medicines.        Dose/Directions    nitroFURantoin (macrocrystal-monohydrate) 100 MG capsule   Commonly known as:  MACROBID   Used for:  Acute cystitis with hematuria   Started by:  Paty Pastor MD        Dose:  100 mg   Take 1 capsule (100 mg) by mouth 2 times daily for 10 days   Quantity:  20 capsule   Refills:  0            Where to get your medicines      These medications were sent to [facility] [address] [place] [id number]    Hours:  24-hours Phone:  [phone]     nitroFURantoin (macrocrystal-monohydrate) 100 MG capsule                Primary Care Provider Office Phone # Fax #    Louis Nixon [phone]       [address] [address]        Equal Access to Services     MERRY COPE AH: John Foreman, tyson gamble, adam kaalmada aniceto, margaret kim. So [facility] [phone].    ATENCIÓN: Si habla español, tiene a meyers disposición servicios gratuitos de asistencia lingüística. Llame al [phone].    We comply  with applicable federal civil rights laws and [place] laws. We do not discriminate on the basis of race, color, national origin, age, disability sex, sexual orientation or gender identity.            Thank you!     Thank you for choosing [facility]  for your care. Our goal is always to provide you with excellent care. Hearing back from our patients is one way we can continue to improve our services. Please take a few minutes to complete the written survey that you may receive in the mail after your visit with us. Thank you!             Your Updated Medication List - Protect others around you: Learn how to safely use, store and throw away your medicines at www.disposemymeds.org.          This list is accurate as of: 7/19/17  8:34 PM.  Always use your most recent med list.                   Brand Name Dispense Instructions for use Diagnosis    FLUoxetine 20 MG capsule    PROzac    270 capsule    Take 3 capsules (60 mg) by mouth daily    Major depressive disorder, single episode, moderate (H)       metroNIDAZOLE 0.75 % topical gel    METROGEL    135 g    Apply topically 2 times daily    Rosacea, Routine general medical examination at a health care facility       nitroFURantoin (macrocrystal-monohydrate) 100 MG capsule    MACROBID    20 capsule    Take 1 capsule (100 mg) by mouth 2 times daily for 10 days    Acute cystitis with hematuria       omeprazole 20 MG CR capsule    priLOSEC    90 capsule    TAKE ONE CAPSULE BY MOUTH ONCE DAILY    Gastroesophageal reflux disease without esophagitis

## 2017-07-20 NOTE — PROGRESS NOTES
SUBJECTIVE:  Chief Complaint   Patient presents with     Urgent Care     UTI     blood in urine and frequency         Kristin Jimenez is a 62 year old female who  presents today for a possible UTI. Symptoms of dysuria, urgency, frequency and burning have been going on for 3hours(s).  Hematuria yes  .  sudden onset, still present and worseningand moderate.  There is no history of fever, chills, nausea or vomiting.  No history of vaginal   discharge. This patient does   have a history of urinary tract infections.  2 culture confirmed UTI this year. Patient denies long duration, rigors, flank pain and temperature > 101 degrees F. or vaginal discharge, vaginal odor and vaginal itching     Past Medical History:   Diagnosis Date     Colon polyp      Family history of colon cancer     Mom     GERD (gastroesophageal reflux disease)      Major depressive disorder, single episode, moderate (H)      OA (osteoarthritis)     Lt hip moderate     Osteopenia      Rosacea      Ulcerative colitis, unspecified     UC - rare issues     Unspecified hemorrhoids without mention of complication        ALLERGIES:  Sulfa drugs      Current Outpatient Prescriptions on File Prior to Visit:  FLUoxetine (PROZAC) 20 MG capsule Take 3 capsules (60 mg) by mouth daily   omeprazole (PRILOSEC) 20 MG CR capsule TAKE ONE CAPSULE BY MOUTH ONCE DAILY   metroNIDAZOLE (METROGEL) 0.75 % gel Apply topically 2 times daily     No current facility-administered medications on file prior to visit.     Social History   Substance Use Topics     Smoking status: Never Smoker     Smokeless tobacco: Never Used     Alcohol use 1.0 oz/week     2 Standard drinks or equivalent per week      Comment: 2 glass of wine per week       Family History   Problem Relation Age of Onset     CANCER Father       Lung CA - SMOKER     Lipids Sister      Lipids Brother      CEREBROVASCULAR DISEASE Maternal Grandmother      NOLAN Maternal Grandfather       Prostate Cancer Maternal Grandfather      Alzheimer Disease Paternal Grandmother      Prostate Cancer Paternal Grandfather      Eye Disorder Mother      glaucoma     Cancer - colorectal Mother      AGE 71 WITH METS liver cancer.  Oct. 2006     Eye Disorder Sister      histoplasmosis     Eye Disorder Brother      histoplasmosis       ROS:   INTEGUMENTARY/SKIN: NEGATIVE for worrisome rashes, moles or lesions  EYES: NEGATIVE for vision changes or irritation  ENT/MOUTH: NEGATIVE for ear, mouth and throat problems  RESP:NEGATIVE for significant cough or SOB  GI: NEGATIVE for nausea, abdominal pain, heartburn, or change in bowel habits    OBJECTIVE:  /74  Pulse 80  Temp 97.9  F (36.6  C) (Oral)  Resp 18  Wt 188 lb (85.3 kg)  LMP 10/20/2003  SpO2 99%  BMI 33.3 kg/m2  GENERAL APPEARANCE: healthy, alert and no distress  RESP: lungs clear to auscultation - no rales, rhonchi or wheezes  CV: regular rates and rhythm, normal S1 S2, no murmur noted  ABDOMEN:  soft, nontender, no HSM or masses and bowel sounds normal  BACK: No CVA tenderness  SKIN: no suspicious lesions or rashes    Results for orders placed or performed in visit on 17   *UA reflex to Microscopic and Culture ([facility] (except [place] and [place])   Result Value Ref Range    Color Urine Red     Appearance Urine Turbid     Glucose Urine Negative NEG mg/dL    Bilirubin Urine Negative NEG    Ketones Urine Negative NEG mg/dL    Specific Gravity Urine 1.010 1.003 - 1.035    Blood Urine Large (A) NEG    pH Urine 6.0 5.0 - 7.0 pH    Protein Albumin Urine 100 (A) NEG mg/dL    Urobilinogen Urine 0.2 0.2 - 1.0 EU/dL    Nitrite Urine Negative NEG    Leukocyte Esterase Urine Moderate (A) NEG    Source Midstream Urine    Urine Microscopic   Result Value Ref Range    WBC Urine 25-50 (A) 0 - 2 /HPF    RBC Urine  (A) 0 - 2 /HPF    Squamous Epithelial /LPF Urine Few FEW /LPF    Bacteria Urine Few (A) NEG /HPF          ASSESSMENT:   Blood in urine     - *UA reflex to Microscopic and Culture ([place] [facility] (except [place] and [place])  - Urine Microscopic    Nonspecific finding on examination of urine     - Urine Culture Aerobic Bacterial    Acute cystitis with hematuria     - nitroFURantoin, macrocrystal-monohydrate, (MACROBID) 100 MG capsule; Take 1 capsule (100 mg) by mouth 2 times daily for 10 days     Drink plenty of fluids.  Prevention and treatment of UTI's discussed.Signs and symptoms of pyelonephritis mentioned.  Follow up with primary care physician if not improving    We discussed that a urine culture is being performed and that if we find that if there is a bacteria in the urine that is resistant to the prescribed antibiotic he/she will receive call to change the antibiotic to better cover the infection.

## 2017-07-20 NOTE — NURSING NOTE
"Chief Complaint   Patient presents with     Urgent Care     UTI     blood in urine and frequency        Initial /74  Pulse 80  Temp 97.9  F (36.6  C) (Oral)  Resp 18  Wt 188 lb (85.3 kg)  LMP 10/20/2003  SpO2 99%  BMI 33.3 kg/m2 Estimated body mass index is 33.3 kg/(m^2) as calculated from the following:    Height as of 7/6/17: 5' 3\" (1.6 m).    Weight as of this encounter: 188 lb (85.3 kg).  Medication Reconciliation: complete     Susana Dillon CMA      "

## 2017-07-20 NOTE — PATIENT INSTRUCTIONS
Understanding Urinary Tract Infections (UTIs)  Most UTIs are caused by bacteria, although they may also be caused by viruses or fungi. Bacteria from the bowel are the most common source of infection. The infection may start because of any of the following:    Sexual activity. During sex, bacteria can travel from the penis, vagina, or rectum into the urethra.     Bacteria on the skin outside the rectum may travel into the urethra. This is more common in women since the rectum and urethra are closer to each other than in men. Wiping from front to back after using the toilet and keeping the area clean can help prevent germs from getting to the urethra.    Blockage of urine flow through the urinary tract. If urine sits too long, germs may start to grow out of control.      Parts of the urinary tract  The infection can occur in any part of the urinary tract.    The kidneys collect and store urine.    The ureters carry urine from the kidneys to the bladder.    The bladder holds urine until you are ready to let it out.    The urethra carries urine from the bladder out of the body. It is shorter in women, so bacteria can move through it more easily. The urethra is longer in men, so a UTI is less likely to reach the bladder or kidneys in men.  Date Last Reviewed: 1/1/2017 2000-2017 [employer]. [address] [place] [address], [place] [address]. All rights reserved. This information is not intended as a substitute for professional medical care. Always follow your healthcare professional's instructions.

## 2017-08-08 ENCOUNTER — RADIANT APPOINTMENT (OUTPATIENT)
Dept: MAMMOGRAPHY | Facility: CLINIC | Age: 62
End: 2017-08-08
Attending: PHYSICIAN ASSISTANT
Payer: COMMERCIAL

## 2017-08-08 DIAGNOSIS — Z12.31 VISIT FOR SCREENING MAMMOGRAM: ICD-10-CM

## 2017-08-08 PROCEDURE — G0202 SCR MAMMO BI INCL CAD: HCPCS | Mod: TC

## 2017-08-09 NOTE — PROGRESS NOTES
Jahaira  Here are your recent results.  They are normal.  If you have any questions please do not hesitate to contact our office via phone ([phone]) or MyChart.    Madyson Parada MS, PA-C  [facility]

## 2017-09-14 ENCOUNTER — OFFICE VISIT (OUTPATIENT)
Dept: FAMILY MEDICINE | Facility: CLINIC | Age: 62
End: 2017-09-14
Payer: COMMERCIAL

## 2017-09-14 VITALS
HEART RATE: 86 BPM | HEIGHT: 63 IN | RESPIRATION RATE: 12 BRPM | OXYGEN SATURATION: 100 % | TEMPERATURE: 98.8 F | DIASTOLIC BLOOD PRESSURE: 81 MMHG | BODY MASS INDEX: 34.02 KG/M2 | SYSTOLIC BLOOD PRESSURE: 120 MMHG | WEIGHT: 192 LBS

## 2017-09-14 DIAGNOSIS — N30.90 RECURRENT CYSTITIS: ICD-10-CM

## 2017-09-14 DIAGNOSIS — R31.9 HEMATURIA: Primary | ICD-10-CM

## 2017-09-14 LAB
ALBUMIN UR-MCNC: NEGATIVE MG/DL
APPEARANCE UR: CLEAR
BACTERIA #/AREA URNS HPF: ABNORMAL /HPF
BILIRUB UR QL STRIP: NEGATIVE
COLOR UR AUTO: YELLOW
GLUCOSE UR STRIP-MCNC: NEGATIVE MG/DL
HGB UR QL STRIP: ABNORMAL
KETONES UR STRIP-MCNC: NEGATIVE MG/DL
LEUKOCYTE ESTERASE UR QL STRIP: ABNORMAL
NITRATE UR QL: NEGATIVE
PH UR STRIP: 7 PH (ref 5–7)
RBC #/AREA URNS AUTO: ABNORMAL /HPF
SOURCE: ABNORMAL
SP GR UR STRIP: 1.01 (ref 1–1.03)
UROBILINOGEN UR STRIP-ACNC: 0.2 EU/DL (ref 0.2–1)
WBC #/AREA URNS AUTO: ABNORMAL /HPF

## 2017-09-14 PROCEDURE — 87086 URINE CULTURE/COLONY COUNT: CPT | Performed by: PHYSICIAN ASSISTANT

## 2017-09-14 PROCEDURE — 81001 URINALYSIS AUTO W/SCOPE: CPT | Performed by: PHYSICIAN ASSISTANT

## 2017-09-14 PROCEDURE — 99214 OFFICE O/P EST MOD 30 MIN: CPT | Performed by: PHYSICIAN ASSISTANT

## 2017-09-14 RX ORDER — CEPHALEXIN 500 MG/1
500 CAPSULE ORAL 2 TIMES DAILY
Qty: 20 CAPSULE | Refills: 0 | Status: SHIPPED | OUTPATIENT
Start: 2017-09-14 | End: 2017-09-24

## 2017-09-14 RX ORDER — NITROFURANTOIN MACROCRYSTALS 50 MG/1
50 CAPSULE ORAL DAILY
Qty: 90 CAPSULE | Refills: 0 | Status: SHIPPED | OUTPATIENT
Start: 2017-09-14 | End: 2017-12-18

## 2017-09-14 NOTE — NURSING NOTE
"Chief Complaint   Patient presents with     Hematuria       Initial /81  Pulse 86  Temp 98.8  F (37.1  C) (Tympanic)  Resp 12  Ht 5' 3\" (1.6 m)  Wt 192 lb (87.1 kg)  LMP 10/20/2003  SpO2 100%  BMI 34.01 kg/m2 Estimated body mass index is 34.01 kg/(m^2) as calculated from the following:    Height as of this encounter: 5' 3\" (1.6 m).    Weight as of this encounter: 192 lb (87.1 kg).  Medication Reconciliation: complete   Florence Bloedow LPN    "

## 2017-09-14 NOTE — MR AVS SNAPSHOT
"              After Visit Summary   9/14/2017    Kristin Jimenez    MRN: [id number]           Patient Information     Date Of Birth          1955        Visit Information        Provider Department      9/14/2017 3:00 PM Madyson Parada PA-C [facility]        Today's Diagnoses     Hematuria    -  1    Recurrent cystitis           Follow-ups after your visit        Who to contact     If you have questions or need follow up information about today's clinic visit or your schedule please contact [facility] directly at [phone].  Normal or non-critical lab and imaging results will be communicated to you by [employer], letter or phone within 4 business days after the clinic has received the results. If you do not hear from us within 7 days, please contact the clinic through [employer] or phone. If you have a critical or abnormal lab result, we will notify you by phone as soon as possible.  Submit refill requests through [employer] or call your pharmacy and they will forward the refill request to us. Please allow 3 business days for your refill to be completed.          Additional Information About Your Visit        MyChart Information     [employer] gives you secure access to your electronic health record. If you see a primary care provider, you can also send messages to your care team and make appointments. If you have questions, please call your primary care clinic.  If you do not have a primary care provider, please call [phone] and they will assist you.        Care EveryWhere ID     This is your Care EveryWhere ID. This could be used by other organizations to access your [place] medical records  [id number]        Your Vitals Were     Pulse Temperature Respirations Height Last Period Pulse Oximetry    86 98.8  F (37.1  C) (Tympanic) 12 5' 3\" (1.6 m) 10/20/2003 100%    BMI (Body Mass Index)                   34.01 kg/m2            Blood Pressure from Last 3 Encounters: "   09/14/17 120/81   07/19/17 120/74   07/06/17 115/65    Weight from Last 3 Encounters:   09/14/17 192 lb (87.1 kg)   07/19/17 188 lb (85.3 kg)   07/06/17 188 lb 3.2 oz (85.4 kg)              We Performed the Following     UA reflex to Microscopic     Urine Microscopic          Today's Medication Changes          These changes are accurate as of: 9/14/17  3:23 PM.  If you have any questions, ask your nurse or doctor.               Start taking these medicines.        Dose/Directions    cephALEXin 500 MG capsule   Commonly known as:  KEFLEX   Used for:  Recurrent cystitis   Started by:  Madyson Parada PA-C        Dose:  500 mg   Take 1 capsule (500 mg) by mouth 2 times daily for 10 days   Quantity:  20 capsule   Refills:  0       nitroFURantoin 50 MG capsule   Commonly known as:  MACRODANTIN   Used for:  Recurrent cystitis   Started by:  Madyson Parada PA-C        Dose:  50 mg   Take 1 capsule (50 mg) by mouth daily For 3 months for UTI prevention   Quantity:  90 capsule   Refills:  0            Where to get your medicines      These medications were sent to [id number] - [place], [place] - [address]  [address], [facility] [address]    Hours:  formerly Maritza Saima Phone:  [phone]     cephALEXin 500 MG capsule    nitroFURantoin 50 MG capsule                Primary Care Provider Office Phone # Fax #    Louis Nixon [phone]       [address] [place] [id number]        Equal Access to Services     [facility]: Hadii yolanda ku hadasho Soomaali, waaxda luqadaha, qaybta kaalmada adeegyada, margaret idiadi kim. So [facility] [phone].    ATENCIÓN: Si habla español, tiene a meyers disposición servicios gratuitos de asistencia lingüística. Llame al [phone].    We comply with applicable federal civil rights laws and [place] laws. We do not discriminate on the basis of race, color, national origin, age, disability sex, sexual  orientation or gender identity.            Thank you!     Thank you for choosing [facility]  for your care. Our goal is always to provide you with excellent care. Hearing back from our patients is one way we can continue to improve our services. Please take a few minutes to complete the written survey that you may receive in the mail after your visit with us. Thank you!             Your Updated Medication List - Protect others around you: Learn how to safely use, store and throw away your medicines at www.disposemymeds.org.          This list is accurate as of: 9/14/17  3:23 PM.  Always use your most recent med list.                   Brand Name Dispense Instructions for use Diagnosis    cephALEXin 500 MG capsule    KEFLEX    20 capsule    Take 1 capsule (500 mg) by mouth 2 times daily for 10 days    Recurrent cystitis       FLUoxetine 20 MG capsule    PROzac    270 capsule    Take 3 capsules (60 mg) by mouth daily    Major depressive disorder, single episode, moderate (H)       metroNIDAZOLE 0.75 % topical gel    METROGEL    135 g    Apply topically 2 times daily    Rosacea, Routine general medical examination at a health care facility       nitroFURantoin 50 MG capsule    MACRODANTIN    90 capsule    Take 1 capsule (50 mg) by mouth daily For 3 months for UTI prevention    Recurrent cystitis       omeprazole 20 MG CR capsule    priLOSEC    90 capsule    TAKE ONE CAPSULE BY MOUTH ONCE DAILY    Gastroesophageal reflux disease without esophagitis

## 2017-09-14 NOTE — PROGRESS NOTES
SUBJECTIVE:   Kristin Jimenez is a 62 year old female who presents to clinic today for the following health issues:    URINARY TRACT SYMPTOMS  Onset: This morning    Description:   Painful urination (Dysuria): YES  Blood in urine (Hematuria): YES, small amount  Delay in urine (Hesitency): YES    Intensity: mild    Progression of Symptoms:  same    Accompanying Signs & Symptoms:  Fever/chills: no   Flank pain no   Nausea and vomiting: no   Any vaginal symptoms: none  Abdominal/Pelvic Pain: YES    History:   History of frequent UTI's: YES- 3  Culture positive UTIs this year (Jan, Feb, July). 7/19/17 UA was pan-sensitive. Kristin felt that her last UTI had cleared completely.   History of kidney stones: no   Sexually Active: YES but no increase of intercourse,   Possibility of pregnancy: No    Precipitating factors:   None    Therapies Tried and outcome:  Advil or Tylenol     Wipes from front to back.    Problem list and histories reviewed & adjusted, as indicated.  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 Yrs     HC REVISE MEDIAN N/CARPAL TUNNEL SURG  12/02    bilateral - dr acuna     SURGICAL HISTORY OF -   6/07    Dr Perales - Lt hip CARMELINA     SURGICAL HISTORY OF -   6/09    Dr Perales - Rt Hip CARMELINA       Social History   Substance Use Topics     Smoking status: Never Smoker     Smokeless  tobacco: Never Used     Alcohol use 1.0 oz/week     2 Standard drinks or equivalent per week      Comment: 2 glass of wine per week     Family History   Problem Relation Age of Onset     CANCER Father       Lung CA - SMOKER     Lipids Sister      Lipids Brother      CEREBROVASCULAR DISEASE Maternal Grandmother      C.A.D. Maternal Grandfather      Prostate Cancer Maternal Grandfather      Alzheimer Disease Paternal Grandmother      Prostate Cancer Paternal Grandfather      Eye Disorder Mother      glaucoma     Cancer - colorectal Mother      AGE 71 WITH METS liver cancer.  Oct. 2006     Eye Disorder Sister      histoplasmosis     Eye Disorder Brother      histoplasmosis         Current Outpatient Prescriptions   Medication Sig Dispense Refill     cephALEXin (KEFLEX) 500 MG capsule Take 1 capsule (500 mg) by mouth 2 times daily for 10 days 20 capsule 0     nitroFURantoin (MACRODANTIN) 50 MG capsule Take 1 capsule (50 mg) by mouth daily For 3 months for UTI prevention 90 capsule 0     FLUoxetine (PROZAC) 20 MG capsule Take 3 capsules (60 mg) by mouth daily 270 capsule 1     omeprazole (PRILOSEC) 20 MG CR capsule TAKE ONE CAPSULE BY MOUTH ONCE DAILY 90 capsule 3     metroNIDAZOLE (METROGEL) 0.75 % gel Apply topically 2 times daily 135 g 3     Allergies   Allergen Reactions     Sulfa Drugs      Rash     Reviewed and updated as needed this visit by clinical staff  Tobacco  Allergies  Meds  Problems  Med Hx  Surg Hx  Fam Hx  Soc Hx        Reviewed and updated as needed this visit by Provider  Tobacco  Allergies  Meds  Problems  Med Hx  Surg Hx  Fam Hx  Soc Hx          ROS:  Constitutional, HEENT, cardiovascular, pulmonary, GI, , musculoskeletal, neuro, skin, endocrine and psych systems are negative, except as otherwise noted.    This document serves as a record of the services and decisions personally performed and made by Madyson Parada PA-C. It was created on her behalf by jesus Manuel  "trained medical scribe. The creation of this document is based the provider's statements to the medical scribe.  Michael Cruz, September 14, 2017 3:19 PM    OBJECTIVE:   /81  Pulse 86  Temp 98.8  F (37.1  C) (Tympanic)  Resp 12  Ht 5' 3\" (1.6 m)  Wt 192 lb (87.1 kg)  LMP 10/20/2003  SpO2 100%  BMI 34.01 kg/m2  Body mass index is 34.01 kg/(m^2).     GENERAL: healthy, alert and no distress  ABDOMEN: Superpubic tenderness. soft, no hepatosplenomegaly, no masses and bowel sounds normal, no CVA tenderness  MS: no gross musculoskeletal defects noted, no edema  SKIN: no suspicious lesions or rashes  NEURO: Normal strength and tone, mentation intact and speech normal    Diagnostic Test Results:  Results for orders placed or performed in visit on 09/14/17 (from the past 24 hour(s))   UA reflex to Microscopic   Result Value Ref Range    Color Urine Yellow     Appearance Urine Clear     Glucose Urine Negative NEG^Negative mg/dL    Bilirubin Urine Negative NEG^Negative    Ketones Urine Negative NEG^Negative mg/dL    Specific Gravity Urine 1.010 1.003 - 1.035    Blood Urine Moderate (A) NEG^Negative    pH Urine 7.0 5.0 - 7.0 pH    Protein Albumin Urine Negative NEG^Negative mg/dL    Urobilinogen Urine 0.2 0.2 - 1.0 EU/dL    Nitrite Urine Negative NEG^Negative    Leukocyte Esterase Urine Small (A) NEG^Negative    Source Midstream Urine    Urine Microscopic   Result Value Ref Range    WBC Urine 5-10 (A) OTO2^O - 2 /HPF    RBC Urine 2-5 (A) OTO2^O - 2 /HPF    Bacteria Urine Few (A) NEG^Negative /HPF     ASSESSMENT/PLAN:   Kristin was seen today for hematuria.    Diagnoses and all orders for this visit:    Hematuria; Recurrent cystitis  If not cleared in 3 months will refer to urology. Return to clinic for lab only visit after finishing course of keflex to ensure resolution of hematuria.  If not cleared, will refer to urology sooner for cystoscopy.  -     UA reflex to Microscopic  -     Urine Microscopic  -     " Urine Culture Aerobic Bacterial  -     cephALEXin (KEFLEX) 500 MG capsule; Take 1 capsule (500 mg) by mouth 2 times daily for 10 days  -     nitroFURantoin (MACRODANTIN) 50 MG capsule; Take 1 capsule (50 mg) by mouth daily For 3 months for UTI prevention  -     **UA reflex to Microscopic FUTURE anytime; Future    FOLLOW UP: For lab only visit after completing 3 months of antibiotic     The information in this document, created by the medical scribe for me, accurately reflects the services I personally performed and the decisions made by me. I have reviewed and approved this document for accuracy prior to leaving the patient care area.  3:26 PM, 09/14/17    Madyson Parada PA-C  [facility] [place]

## 2017-09-15 LAB
BACTERIA SPEC CULT: NORMAL
SPECIMEN SOURCE: NORMAL

## 2017-09-17 NOTE — PROGRESS NOTES
Jahaira  Here are your recent results.  Your urine didn't grow out any particular bacteria, but if your symptoms are not improving complete the antibiotic.  If they are not improving, return to the clinic for further evaluation.      Additionally, please do a lab only urine recheck to make sure the blood in your urine clears once you complete the antibiotic.     If you have any questions please do not hesitate to contact our office via phone ([phone]) or [employer].    Madyson Parada, MS, PA-C  [facility] [place]

## 2017-10-12 ENCOUNTER — OFFICE VISIT (OUTPATIENT)
Dept: FAMILY MEDICINE | Facility: CLINIC | Age: 62
End: 2017-10-12
Payer: COMMERCIAL

## 2017-10-12 VITALS
RESPIRATION RATE: 12 BRPM | DIASTOLIC BLOOD PRESSURE: 74 MMHG | TEMPERATURE: 98.2 F | SYSTOLIC BLOOD PRESSURE: 112 MMHG | HEIGHT: 63 IN | HEART RATE: 90 BPM | OXYGEN SATURATION: 100 % | WEIGHT: 190 LBS | BODY MASS INDEX: 33.66 KG/M2

## 2017-10-12 DIAGNOSIS — N95.1 VAGINAL DRYNESS, MENOPAUSAL: ICD-10-CM

## 2017-10-12 DIAGNOSIS — R39.15 URINARY URGENCY: Primary | ICD-10-CM

## 2017-10-12 DIAGNOSIS — N30.01 ACUTE CYSTITIS WITH HEMATURIA: ICD-10-CM

## 2017-10-12 DIAGNOSIS — R82.90 NONSPECIFIC FINDING ON EXAMINATION OF URINE: ICD-10-CM

## 2017-10-12 LAB
ALBUMIN UR-MCNC: 100 MG/DL
APPEARANCE UR: CLEAR
BACTERIA #/AREA URNS HPF: ABNORMAL /HPF
BILIRUB UR QL STRIP: NEGATIVE
COLOR UR AUTO: YELLOW
GLUCOSE UR STRIP-MCNC: NEGATIVE MG/DL
HGB UR QL STRIP: ABNORMAL
KETONES UR STRIP-MCNC: NEGATIVE MG/DL
LEUKOCYTE ESTERASE UR QL STRIP: ABNORMAL
NITRATE UR QL: NEGATIVE
PH UR STRIP: 6 PH (ref 5–7)
RBC #/AREA URNS AUTO: >100 /HPF
SOURCE: ABNORMAL
SP GR UR STRIP: 1.01 (ref 1–1.03)
UROBILINOGEN UR STRIP-ACNC: 0.2 EU/DL (ref 0.2–1)
WBC #/AREA URNS AUTO: ABNORMAL /HPF

## 2017-10-12 PROCEDURE — 87086 URINE CULTURE/COLONY COUNT: CPT | Performed by: PHYSICIAN ASSISTANT

## 2017-10-12 PROCEDURE — 99214 OFFICE O/P EST MOD 30 MIN: CPT | Performed by: PHYSICIAN ASSISTANT

## 2017-10-12 PROCEDURE — 81001 URINALYSIS AUTO W/SCOPE: CPT | Performed by: PHYSICIAN ASSISTANT

## 2017-10-12 RX ORDER — CIPROFLOXACIN 500 MG/1
500 TABLET, FILM COATED ORAL 2 TIMES DAILY
Qty: 20 TABLET | Refills: 0 | Status: SHIPPED | OUTPATIENT
Start: 2017-10-12 | End: 2017-10-22

## 2017-10-12 RX ORDER — GLYCERIN/MIN OIL/POLYCARBOPHIL
GEL WITH APPLICATOR (GRAM) VAGINAL
COMMUNITY
Start: 2017-10-12 | End: 2018-09-18

## 2017-10-12 NOTE — PROGRESS NOTES
SUBJECTIVE:   Kristin Jimenez is a 62 year old female who presents to clinic today for the following health issues:    Urinary frequency  Patient was seen on 9/14 and started on 10 day regimen of Keflex and 3 month Nitrofurantoin therapy for UTI prevention t for treatment of hematuria and recurrent cystitis.  She had been treated for 3 culture positive UTIs at that time.  She was advised to do a repeat UA after her treatment course but she never did this.  Plan was to refer to urology for cystoscopy if not cleared.  She reports complete resolution of her symptoms from the keflex despite a negative urinary culture.  She continues on the preventative nitrofurantoin today.    Today, patient reports recurrence of UTI symptoms this morning. She notes urgency and some blood in her urine.  She has a history of postmenopausal vaginal dryness. She has been using KY gel for vaginal dryness.  She denies burning, fevers, constipation, flank pain and she reports that she wipes from front to back.      Problem list and histories reviewed & adjusted, as indicated.  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 Yrs     HC REVISE MEDIAN N/CARPAL TUNNEL SURG  12/02    bilateral - dr acuna     SURGICAL HISTORY OF -   6/07    Dr Perales - Lt hip CARMELINA     SURGICAL HISTORY OF -   6/09    Dr Perales - Rt Hip CARMELINA        Social History   Substance Use Topics     Smoking status: Never Smoker     Smokeless tobacco: Never Used     Alcohol use 1.0 oz/week     2 Standard drinks or equivalent per week      Comment: 2 glass of wine per week     Family History   Problem Relation Age of Onset     CANCER Father       Lung CA - SMOKER     Lipids Sister      Lipids Brother      CEREBROVASCULAR DISEASE Maternal Grandmother      C.A.D. Maternal Grandfather      Prostate Cancer Maternal Grandfather      Alzheimer Disease Paternal Grandmother      Prostate Cancer Paternal Grandfather      Eye Disorder Mother      glaucoma     Cancer - colorectal Mother      AGE 71 WITH METS liver cancer.  Oct. 2006     Eye Disorder Sister      histoplasmosis     Eye Disorder Brother      histoplasmosis         Current Outpatient Prescriptions   Medication Sig Dispense Refill     Vaginal Lubricant (REPLENS) GEL Use per package instructions       ciprofloxacin (CIPRO) 500 MG tablet Take 1 tablet (500 mg) by mouth 2 times daily for 10 days 20 tablet 0     nitroFURantoin (MACRODANTIN) 50 MG capsule Take 1 capsule (50 mg) by mouth daily For 3 months for UTI prevention 90 capsule 0     FLUoxetine (PROZAC) 20 MG capsule Take 3 capsules (60 mg) by mouth daily 270 capsule 1     omeprazole (PRILOSEC) 20 MG CR capsule TAKE ONE CAPSULE BY MOUTH ONCE DAILY 90 capsule 3     metroNIDAZOLE (METROGEL) 0.75 % gel Apply topically 2 times daily 135 g 3     Allergies   Allergen Reactions     Sulfa Drugs      Rash         Reviewed and updated as needed this visit by clinical staf  Tobacco  Allergies  Meds  Problems  Med Hx  Surg Hx  Fam Hx  Soc Hx        Reviewed and updated as needed this visit by Provider  Tobacco  Allergies  Meds  Problems  Med Hx  Surg Hx  Fam Hx  Soc Hx          ROS:  Constitutional, HEENT, cardiovascular, pulmonary, GI, , musculoskeletal, neuro, skin, endocrine and psych systems are negative, except as otherwise noted.    This document  "serves as a record of the services and decisions personally performed and made by Madyson Parada PA-C. It was created on her behalf by Marija Frias, a trained medical scribe. The creation of this document is based on the provider's statements to the medical scribe.  Marija Frias 3:21 PM October 12, 2017      OBJECTIVE:   /74  Pulse 90  Temp 98.2  F (36.8  C) (Tympanic)  Resp 12  Ht 5' 3\" (1.6 m)  Wt 190 lb (86.2 kg)  LMP 10/20/2003  SpO2 100%  BMI 33.66 kg/m2  Body mass index is 33.66 kg/(m^2).  GENERAL: healthy, alert and no distress  SKIN: no suspicious lesions or rashes  PSYCH: mentation appears normal, affect normal/bright    9/14/17 Urine Component Results   Component Collected Lab   Specimen Description 09/14/2017  3:26    Midstream Urine   Culture Micro 09/14/2017  3:26    10,000 to 50,000 colonies/mL   mixed urogenital daija   Susceptibility testing not routinely done      Diagnostic Test Results:  Results for orders placed or performed in visit on 10/12/17 (from the past 24 hour(s))   UA reflex to Microscopic   Result Value Ref Range    Color Urine Yellow     Appearance Urine Clear     Glucose Urine Negative NEG^Negative mg/dL    Bilirubin Urine Negative NEG^Negative    Ketones Urine Negative NEG^Negative mg/dL    Specific Gravity Urine 1.015 1.003 - 1.035    Blood Urine Large (A) NEG^Negative    pH Urine 6.0 5.0 - 7.0 pH    Protein Albumin Urine 100 (A) NEG^Negative mg/dL    Urobilinogen Urine 0.2 0.2 - 1.0 EU/dL    Nitrite Urine Negative NEG^Negative    Leukocyte Esterase Urine Large (A) NEG^Negative    Source Midstream Urine    Urine Microscopic   Result Value Ref Range    WBC Urine  (A) OTO2^O - 2 /HPF    RBC Urine >100 (A) OTO2^O - 2 /HPF    Bacteria Urine Moderate (A) NEG^Negative /HPF       ASSESSMENT/PLAN:     Urinary urgency, Nonspecific finding on examination of urine, Vaginal dryness, menopausal, Acute cystitis with hematuria  Patient presents today with UTI " recurrence. She is currently on 3 month therapy of 50 mg Nitrofurantion daily for UTI prevention.  Unfortunately, this did not appear to deter her current infection. She will be started on Ciprofloxacin for current infection.  We suspect her UTI recurrence is could be due to her postmenopausal vaginal dryness. Recommended she start her on Replens today. She has been provided with a urology referral for follow up as well due to the amount of infections this year. Urine culture pending.        -     Urine Culture Aerobic Bacterial  -     Vaginal Lubricant (REPLENS) GEL; Use per package instructions  -     ciprofloxacin (CIPRO) 500 MG tablet; Take 1 tablet (500 mg) by mouth 2 times daily for 10 days  -     UROLOGY ADULT REFERRAL  -     UA reflex to Microscopic  -     Urine Microscopic      The information in this document, created by the medical scribe for me, accurately reflects the services I personally performed and the decisions made by me. I have reviewed and approved this document for accuracy prior to leaving the patient care area.  October 12, 2017 3:51 PM    Madyson Parada PA-C  [facility]

## 2017-10-12 NOTE — NURSING NOTE
"Chief Complaint   Patient presents with     Dysuria       Initial /74  Pulse 90  Temp 98.2  F (36.8  C) (Tympanic)  Resp 12  Ht 5' 3\" (1.6 m)  Wt 190 lb (86.2 kg)  LMP 10/20/2003  SpO2 100%  BMI 33.66 kg/m2 Estimated body mass index is 33.66 kg/(m^2) as calculated from the following:    Height as of this encounter: 5' 3\" (1.6 m).    Weight as of this encounter: 190 lb (86.2 kg).  Medication Reconciliation: complete   [place]orence Bloedow LPN    "

## 2017-10-12 NOTE — MR AVS SNAPSHOT
After Visit Summary   10/12/2017    Kristin Jimenez    MRN: [id number]           Patient Information     Date Of Birth          1955        Visit Information        Provider Department      10/12/2017 2:00 PM Madyson Parada PA-C [facility]        Today's Diagnoses     Urinary urgency    -  1    Nonspecific finding on examination of urine        Vaginal dryness, menopausal        Acute cystitis with hematuria           Follow-ups after your visit        Additional Services     UROLOGY ADULT REFERRAL       Your provider has referred you to: [facility]: [facility] [place] [phone]   [email or web address]    Please be aware that coverage of these services is subject to the terms and limitations of your health insurance plan.  Call member services at your health plan with any benefit or coverage questions.      Please bring the following with you to your appointment:    (1) Any X-Rays, CTs or MRIs which have been performed.  Contact the facility where they were done to arrange for  prior to your scheduled appointment.    (2) List of current medications  (3) This referral request   (4) Any documents/labs given to you for this referral                  Who to contact     If you have questions or need follow up information about today's clinic visit or your schedule please contact [facility] directly at [phone].  Normal or non-critical lab and imaging results will be communicated to you by MyChart, letter or phone within 4 business days after the clinic has received the results. If you do not hear from us within 7 days, please contact the clinic through MyChart or phone. If you have a critical or abnormal lab result, we will notify you by phone as soon as possible.  Submit refill requests through [employer] or call your pharmacy and they will forward the refill request to us. Please allow 3 business days for your refill to  "be completed.          Additional Information About Your Visit        [employer] Information     [employer] gives you secure access to your electronic health record. If you see a primary care provider, you can also send messages to your care team and make appointments. If you have questions, please call your primary care clinic.  If you do not have a primary care provider, please call [phone] and they will assist you.        Care EveryWhere ID     This is your Care EveryWhere ID. This could be used by other organizations to access your [place] medical records  [id number]        Your Vitals Were     Pulse Temperature Respirations Height Last Period Pulse Oximetry    90 98.2  F (36.8  C) (Tympanic) 12 5' 3\" (1.6 m) 10/20/2003 100%    BMI (Body Mass Index)                   33.66 kg/m2            Blood Pressure from Last 3 Encounters:   10/12/17 112/74   09/14/17 120/81   07/19/17 120/74    Weight from Last 3 Encounters:   10/12/17 190 lb (86.2 kg)   09/14/17 192 lb (87.1 kg)   07/19/17 188 lb (85.3 kg)              We Performed the Following     UA reflex to Microscopic     Urine Culture Aerobic Bacterial     Urine Microscopic     UROLOGY ADULT REFERRAL          Today's Medication Changes          These changes are accurate as of: 10/12/17  2:24 PM.  If you have any questions, ask your nurse or doctor.               Start taking these medicines.        Dose/Directions    ciprofloxacin 500 MG tablet   Commonly known as:  CIPRO   Used for:  Acute cystitis with hematuria   Started by:  Madyson Parada PA-C        Dose:  500 mg   Take 1 tablet (500 mg) by mouth 2 times daily for 10 days   Quantity:  20 tablet   Refills:  0       replens Gel   Used for:  Vaginal dryness, menopausal   Started by:  Madyson Parada PA-C        Use per package instructions   Refills:  0            Where to get your medicines      These medications were sent to [facility] - [address]  [address] [facility] [address]     Phone:  [phone]     ciprofloxacin 500 MG tablet         Some of these will need a paper prescription and others can be bought over the counter.  Ask your nurse if you have questions.     You don't need a prescription for these medications     replens Gel                Primary Care Provider Office Phone # Fax #    Louis Nixon [phone]       [address] [place] [address]        Equal Access to Services     MERRY COPE : Hadii aad ku hadasho Soomaali, waaxda luqadaha, qaybta kaalmada adeegyada, waxay idiin hayaan adeeg kharash laemeterio kim. So [facility] [phone].    ATENCIÓN: Si habla español, tiene a meyers disposición servicios gratuitos de asistencia lingüística. [facility] [phone].    We comply with applicable federal civil rights laws and [place] laws. We do not discriminate on the basis of race, color, national origin, age, disability, sex, sexual orientation, or gender identity.            Thank you!     Thank you for choosing [facility]  for your care. Our goal is always to provide you with excellent care. Hearing back from our patients is one way we can continue to improve our services. Please take a few minutes to complete the written survey that you may receive in the mail after your visit with us. Thank you!             Your Updated Medication List - Protect others around you: Learn how to safely use, store and throw away your medicines at www.disposemymeds.org.          This list is accurate as of: 10/12/17  2:24 PM.  Always use your most recent med list.                   Brand Name Dispense Instructions for use Diagnosis    ciprofloxacin 500 MG tablet    CIPRO    20 tablet    Take 1 tablet (500 mg) by mouth 2 times daily for 10 days    Acute cystitis with hematuria       FLUoxetine 20 MG capsule    PROzac    270 capsule    Take 3 capsules (60 mg) by mouth daily    Major depressive disorder, single  episode, moderate (H)       metroNIDAZOLE 0.75 % topical gel    METROGEL    135 g    Apply topically 2 times daily    Rosacea, Routine general medical examination at a health care facility       nitroFURantoin 50 MG capsule    MACRODANTIN    90 capsule    Take 1 capsule (50 mg) by mouth daily For 3 months for UTI prevention    Recurrent cystitis       omeprazole 20 MG CR capsule    priLOSEC    90 capsule    TAKE ONE CAPSULE BY MOUTH ONCE DAILY    Gastroesophageal reflux disease without esophagitis       replens Gel      Use per package instructions    Vaginal dryness, menopausal

## 2017-10-13 LAB
BACTERIA SPEC CULT: NO GROWTH
SPECIMEN SOURCE: NORMAL

## 2017-10-23 DIAGNOSIS — N30.01 ACUTE CYSTITIS WITH HEMATURIA: Primary | ICD-10-CM

## 2017-10-24 ENCOUNTER — OFFICE VISIT (OUTPATIENT)
Dept: UROLOGY | Facility: CLINIC | Age: 62
End: 2017-10-24
Payer: COMMERCIAL

## 2017-10-24 VITALS
WEIGHT: 192 LBS | SYSTOLIC BLOOD PRESSURE: 118 MMHG | HEART RATE: 70 BPM | BODY MASS INDEX: 34.02 KG/M2 | DIASTOLIC BLOOD PRESSURE: 76 MMHG | HEIGHT: 63 IN

## 2017-10-24 DIAGNOSIS — N30.01 ACUTE CYSTITIS WITH HEMATURIA: ICD-10-CM

## 2017-10-24 LAB
ALBUMIN UR-MCNC: NEGATIVE MG/DL
APPEARANCE UR: CLEAR
BILIRUB UR QL STRIP: NEGATIVE
COLOR UR AUTO: YELLOW
GLUCOSE UR STRIP-MCNC: NEGATIVE MG/DL
HGB UR QL STRIP: ABNORMAL
KETONES UR STRIP-MCNC: NEGATIVE MG/DL
LEUKOCYTE ESTERASE UR QL STRIP: NEGATIVE
MUCOUS THREADS #/AREA URNS LPF: PRESENT /LPF
NITRATE UR QL: NEGATIVE
PH UR STRIP: 5.5 PH (ref 5–7)
RBC #/AREA URNS AUTO: <1 /HPF (ref 0–2)
SOURCE: ABNORMAL
SP GR UR STRIP: 1.01 (ref 1–1.03)
SQUAMOUS #/AREA URNS AUTO: <1 /HPF (ref 0–1)
UROBILINOGEN UR STRIP-ACNC: 0.2 EU/DL (ref 0.2–1)
WBC #/AREA URNS AUTO: 0 /HPF (ref 0–2)

## 2017-10-24 PROCEDURE — 87109 MYCOPLASMA: CPT | Mod: 59 | Performed by: PHYSICIAN ASSISTANT

## 2017-10-24 PROCEDURE — 81001 URINALYSIS AUTO W/SCOPE: CPT | Performed by: PHYSICIAN ASSISTANT

## 2017-10-24 PROCEDURE — 99243 OFF/OP CNSLTJ NEW/EST LOW 30: CPT | Performed by: PHYSICIAN ASSISTANT

## 2017-10-24 PROCEDURE — 87086 URINE CULTURE/COLONY COUNT: CPT | Performed by: PHYSICIAN ASSISTANT

## 2017-10-24 PROCEDURE — 87109 MYCOPLASMA: CPT | Performed by: PHYSICIAN ASSISTANT

## 2017-10-24 ASSESSMENT — PAIN SCALES - GENERAL: PAINLEVEL: NO PAIN (0)

## 2017-10-24 NOTE — MR AVS SNAPSHOT
After Visit Summary   10/24/2017    Kristin Jimenez    MRN: [id number]           Patient Information     Date Of Birth          1955        Visit Information        Provider Department      10/24/2017 2:00 PM Rosanne Savage PA-C [facility] [place]        Today's Diagnoses     Acute cystitis with hematuria          Care Instructions    1. Urinalysis and culture  2. Mycoplasm, Ureaplasm  3. CT A/P w/wo contrast. - CT scan: Please call [phone] to schedule this at [facility].   4. Cystoscopy with first available urologist  5. Hydrate to keep the urine dilute.   6. Lifestyle and hygiene modifications: wiping front to back, wearing cotton breathable underwear, voiding before and after intercourse to flush the urethra, minimizing baths and opting for showers, and appropriate perineal hygiene.     Below is a list of foods that can irritate the bladder:      Caffeinated soft drinks.    Coffee.    Tea.    Chocolate.    Tomato-based foods.    Acidic juices and fruits.    Alcohol.    Carbonated drinks.    Aspartame/Nutrasweet.            Follow-ups after your visit        Your next 10 appointments already scheduled     Nov 28, 2017  2:30 PM CST   Cystoscopy with Nikita Jackson MD, UB CYF   [facility] [place] ([facility])    [address]  [address]  [facility] [address] [phone]              Future tests that were ordered for you today     Open Future Orders        Priority Expected Expires Ordered    CT Abdomen Pelvis w/o & w Contrast Routine  10/24/2018 10/24/2017            Who to contact     If you have questions or need follow up information about today's clinic visit or your schedule please contact [facility] [place] directly at [phone].  Normal or non-critical lab and imaging results will be communicated to you by  "MyChart, letter or phone within 4 business days after the clinic has received the results. If you do not hear from us within 7 days, please contact the clinic through [employer] or phone. If you have a critical or abnormal lab result, we will notify you by phone as soon as possible.  Submit refill requests through [employer] or call your pharmacy and they will forward the refill request to us. Please allow 3 business days for your refill to be completed.          Additional Information About Your Visit        [employer] Information     [employer] gives you secure access to your electronic health record. If you see a primary care provider, you can also send messages to your care team and make appointments. If you have questions, please call your primary care clinic.  If you do not have a primary care provider, please call [phone] and they will assist you.        Care EveryWhere ID     This is your Care EveryWhere ID. This could be used by other organizations to access your [place] medical records  [id number]        Your Vitals Were     Pulse Height Last Period Breastfeeding? BMI (Body Mass Index)       70 1.6 m (5' 3\") 10/20/2003 No 34.01 kg/m2        Blood Pressure from Last 3 Encounters:   10/24/17 118/76   10/12/17 112/74   09/14/17 120/81    Weight from Last 3 Encounters:   10/24/17 87.1 kg (192 lb)   10/12/17 86.2 kg (190 lb)   09/14/17 87.1 kg (192 lb)              We Performed the Following     Cytology non gyn     Mycoplasma large colony culture     UA without Microscopic     Ureaplasma culture     Urine Culture Aerobic Bacterial     Urine Micro Urologic Phys        Primary Care Provider Office Phone # Fax #    Louis Nixon [phone] [address] [facility] [address]        Equal Access to Services     [facility] PRISCA : John Foreman, tyson gamble, jessicaybmargaret jonas. So [facility] [phone].    ATENCIÓN: Si habla " español, tiene a meyers disposición servicios gratuitos de asistencia lingüística. Andressa chadwick [phone].    We comply with applicable federal civil rights laws and [place] laws. We do not discriminate on the basis of race, color, national origin, age, disability, sex, sexual orientation, or gender identity.            Thank you!     Thank you for choosing [facility] [place]  for your care. Our goal is always to provide you with excellent care. Hearing back from our patients is one way we can continue to improve our services. Please take a few minutes to complete the written survey that you may receive in the mail after your visit with us. Thank you!             Your Updated Medication List - Protect others around you: Learn how to safely use, store and throw away your medicines at www.disposemymeds.org.          This list is accurate as of: 10/24/17  2:57 PM.  Always use your most recent med list.                   Brand Name Dispense Instructions for use Diagnosis    FLUoxetine 20 MG capsule    PROzac    270 capsule    Take 3 capsules (60 mg) by mouth daily    Major depressive disorder, single episode, moderate (H)       metroNIDAZOLE 0.75 % topical gel    METROGEL    135 g    Apply topically 2 times daily    Rosacea, Routine general medical examination at a health care facility       nitroFURantoin 50 MG capsule    MACRODANTIN    90 capsule    Take 1 capsule (50 mg) by mouth daily For 3 months for UTI prevention    Recurrent cystitis       omeprazole 20 MG CR capsule    priLOSEC    90 capsule    TAKE ONE CAPSULE BY MOUTH ONCE DAILY    Gastroesophageal reflux disease without esophagitis       replens Gel      Use per package instructions    Vaginal dryness, menopausal

## 2017-10-24 NOTE — LETTER
"10/24/2017       RE: Kristin Jimenez  [address] [facility] [address]     Dear Colleague,    Thank you for referring your patient, Kristin Jimenez, to the [facility] [place] at [facility] Please see a copy of my visit note below.    CC: Hematuria.    HPI: It is a pleasure to see Ms. Kristin Jimenez, a 62 year old female, asked to be seen in consultation by Madyson Parada PA-C for evaluation of gross hematuria and cystitis. Pt states she has had several UTIs over the past 6 months (Not all culture positive, see cultures).    The patient and  report \"25\" episodes of gross hematuria.  Ms. Jimenez voids without difficulty, and reports nocturia of 0-2.  She currently denies any dysuria, pyuria, hesitancy, intermittency, feelings of incomplete emptying, or any recent history of  Stones.    Has been treated with Cipro, Bactrim and Macrobid. Recently completed a course of Cipro (culture negative). She states the blood in her urine and frequency resolve for a short time with antibiotics. Now on maintenance Macrobid and started on Replens for vaginal dryness.    Hematuria Risk Factors:  Age >40: Yes     Smoking history: No  Occupational exposure to chemicals or dyes (ie, benzenes, aromatic amines): no  History of urologic disorder or disease: no  History of irritative voiding symptoms: no  History of urinary tract infection: no  Analgesic abuse: no  History of pelvic irradiation: no    Past Medical History:   Diagnosis Date     Colon polyp      Family history of colon cancer     Mom     GERD (gastroesophageal reflux disease) 2012     Major depressive disorder, single episode, moderate (H) 5/09     OA (osteoarthritis) 2004    Lt hip moderate     Osteopenia 7/09     Rosacea 1998     Ulcerative colitis, unspecified 1985    UC - rare issues     Unspecified hemorrhoids without mention of complication 4/07     Past Surgical History: " "  Procedure Laterality Date     COLONOSCOPY  8/16/2012     HC COLONOSCOPY THRU STOMA, DIAGNOSTIC  4/07, 2012, 12/16    colon polyp, diverticulosis, hemorrhoids - due ? Yrs     HC REVISE MEDIAN N/CARPAL TUNNEL SURG  12/02    bilateral - dr acuna     SURGICAL HISTORY OF -   6/07    Dr Perales - Lt hip CARMELINA     SURGICAL HISTORY OF -   6/09    Dr Perales - Rt Hip CARMELINA     Current Outpatient Prescriptions   Medication Sig Dispense Refill     Vaginal Lubricant (REPLENS) GEL Use per package instructions       nitroFURantoin (MACRODANTIN) 50 MG capsule Take 1 capsule (50 mg) by mouth daily For 3 months for UTI prevention 90 capsule 0     FLUoxetine (PROZAC) 20 MG capsule Take 3 capsules (60 mg) by mouth daily 270 capsule 1     omeprazole (PRILOSEC) 20 MG CR capsule TAKE ONE CAPSULE BY MOUTH ONCE DAILY 90 capsule 3     metroNIDAZOLE (METROGEL) 0.75 % gel Apply topically 2 times daily 135 g 3     Allergies   Allergen Reactions     Sulfa Drugs      Rash     FAMILY HISTORY: There is no reported history of genitourinary carcinoma.  There is no history of urolithiasis.      SOCIAL HISTORY: The patient does not smoke cigarettes. Denies EtOH and illicit drug abuse.      ROS: A comprehensive 14 point ROS was obtained and negative except for that outlined above in the HPI.    PHYSICAL EXAM:   Vitals:    10/24/17 1424   BP: 118/76   Pulse: 70   Weight: 87.1 kg (192 lb)   Height: 1.6 m (5' 3\")     GENERAL: Well groomed, well developed, well nourished female in NAD.  HEENT: AT, NC, EOMI bilaterally.  SKIN: Warm to touch, dry.  No visible rashes or lesions on examined areas.  RESP: No increased respiratory effort.  MS: Full ROM in extremities.  PELVIC: Deferred for now.   NEURO: Alert and oriented x 3.  PSYCH: Normal mood and affect, pleasant and agreeable during interview and exam.      Office Visit on 10/12/2017   Component Date Value Ref Range Status     Color Urine 10/12/2017 Yellow   Final     Appearance Urine 10/12/2017 " Clear   Final     Glucose Urine 10/12/2017 Negative  NEG^Negative mg/dL Final     Bilirubin Urine 10/12/2017 Negative  NEG^Negative Final     Ketones Urine 10/12/2017 Negative  NEG^Negative mg/dL Final     Specific Gravity Urine 10/12/2017 1.015  1.003 - 1.035 Final     Blood Urine 10/12/2017 Large* NEG^Negative Final     pH Urine 10/12/2017 6.0  5.0 - 7.0 pH Final     Protein Albumin Urine 10/12/2017 100* NEG^Negative mg/dL Final     Urobilinogen Urine 10/12/2017 0.2  0.2 - 1.0 EU/dL Final     Nitrite Urine 10/12/2017 Negative  NEG^Negative Final     Leukocyte Esterase Urine 10/12/2017 Large* NEG^Negative Final     Source 10/12/2017 Midstream Urine   Final     WBC Urine 10/12/2017 * OTO2^O - 2 /HPF Final     RBC Urine 10/12/2017 >100* OTO2^O - 2 /HPF Final     Bacteria Urine 10/12/2017 Moderate* NEG^Negative /HPF Final     Specimen Description 10/12/2017 Midstream Urine   Final     Culture Micro 10/12/2017 No growth   Final       IMAGING: None    ASSESSMENT and PLAN:    62 year old female with gross hematuria.  The differential diagnosis at this point includes stone disease, infection, vaginal contaminant, urothelial malignancy, renal disorder versus another yet unknown diagnosis.    At this time, recommend proceeding with comprehensive hematuria evaluation to include:  - Can continue Macrobid and Replens  - Mycoplasma and Ureaplasma  - Urinalysis and urine culture to rule out an acute urinary tract infection.   - Urine cytology to look for cells concerning for malignancy.  - CT urogram for upper tract imaging.  - Cystoscopy with the first available urologist to evaluate the interior of the bladder. Follow up for hematuria as recommended by urologist performing cystoscopic evaluation.    Thank you for allowing me to participate in Ms. Jimenez's care. I will keep you updated of her progress, but please do not hesitate to contact me with any questions.    Rosanne Savage PA-C  [facility]  30 minutes were  spent with the patient today, > 50% in counseling and coordination of care.

## 2017-10-24 NOTE — PROGRESS NOTES
"CC: Hematuria.    HPI: It is a pleasure to see Ms. Kristin Jimenez, a 62 year old female, asked to be seen in consultation by Madyson Parada PA-C for evaluation of gross hematuria and cystitis. Pt states she has had several UTIs over the past 6 months (Not all culture positive, see cultures).    The patient and  report \"25\" episodes of gross hematuria.  Ms. Jimenez voids without difficulty, and reports nocturia of 0-2.  She currently denies any dysuria, pyuria, hesitancy, intermittency, feelings of incomplete emptying, or any recent history of  Stones.    Has been treated with Cipro, Bactrim and Macrobid. Recently completed a course of Cipro (culture negative). She states the blood in her urine and frequency resolve for a short time with antibiotics. Now on maintenance Macrobid and started on Replens for vaginal dryness.    Hematuria Risk Factors:  Age >40: Yes     Smoking history: No  Occupational exposure to chemicals or dyes (ie, benzenes, aromatic amines): no  History of urologic disorder or disease: no  History of irritative voiding symptoms: no  History of urinary tract infection: no  Analgesic abuse: no  History of pelvic irradiation: no    Past Medical History:   Diagnosis Date     Colon polyp      Family history of colon cancer     Mom     GERD (gastroesophageal reflux disease) 2012     Major depressive disorder, single episode, moderate (H) 5/09     OA (osteoarthritis) 2004    Lt hip moderate     Osteopenia 7/09     Rosacea 1998     Ulcerative colitis, unspecified 1985    UC - rare issues     Unspecified hemorrhoids without mention of complication 4/07     Past Surgical History:   Procedure Laterality Date     COLONOSCOPY  8/16/2012     HC COLONOSCOPY THRU STOMA, DIAGNOSTIC  4/07, 2012, 12/16    colon polyp, diverticulosis, hemorrhoids - due ? Yrs     HC REVISE MEDIAN N/CARPAL TUNNEL SURG  12/02    bilateral - dr acuna     SURGICAL HISTORY OF -   6/07    Dr Perales - Lt hip CARMELINA     " "SURGICAL HISTORY OF -   6/09    Dr Perales - Rt Hip CARMELINA     Current Outpatient Prescriptions   Medication Sig Dispense Refill     Vaginal Lubricant (REPLENS) GEL Use per package instructions       nitroFURantoin (MACRODANTIN) 50 MG capsule Take 1 capsule (50 mg) by mouth daily For 3 months for UTI prevention 90 capsule 0     FLUoxetine (PROZAC) 20 MG capsule Take 3 capsules (60 mg) by mouth daily 270 capsule 1     omeprazole (PRILOSEC) 20 MG CR capsule TAKE ONE CAPSULE BY MOUTH ONCE DAILY 90 capsule 3     metroNIDAZOLE (METROGEL) 0.75 % gel Apply topically 2 times daily 135 g 3     Allergies   Allergen Reactions     Sulfa Drugs      Rash     FAMILY HISTORY: There is no reported history of genitourinary carcinoma.  There is no history of urolithiasis.      SOCIAL HISTORY: The patient does not smoke cigarettes. Denies EtOH and illicit drug abuse.      ROS: A comprehensive 14 point ROS was obtained and negative except for that outlined above in the HPI.    PHYSICAL EXAM:   Vitals:    10/24/17 1424   BP: 118/76   Pulse: 70   Weight: 87.1 kg (192 lb)   Height: 1.6 m (5' 3\")     GENERAL: Well groomed, well developed, well nourished female in NAD.  HEENT: AT, NC, EOMI bilaterally.  SKIN: Warm to touch, dry.  No visible rashes or lesions on examined areas.  RESP: No increased respiratory effort.  MS: Full ROM in extremities.  PELVIC: Deferred for now.   NEURO: Alert and oriented x 3.  PSYCH: Normal mood and affect, pleasant and agreeable during interview and exam.      Office Visit on 10/12/2017   Component Date Value Ref Range Status     Color Urine 10/12/2017 Yellow   Final     Appearance Urine 10/12/2017 Clear   Final     Glucose Urine 10/12/2017 Negative  NEG^Negative mg/dL Final     Bilirubin Urine 10/12/2017 Negative  NEG^Negative Final     Ketones Urine 10/12/2017 Negative  NEG^Negative mg/dL Final     Specific Gravity Urine 10/12/2017 1.015  1.003 - 1.035 Final     Blood Urine 10/12/2017 Large* NEG^Negative " Final     pH Urine 10/12/2017 6.0  5.0 - 7.0 pH Final     Protein Albumin Urine 10/12/2017 100* NEG^Negative mg/dL Final     Urobilinogen Urine 10/12/2017 0.2  0.2 - 1.0 EU/dL Final     Nitrite Urine 10/12/2017 Negative  NEG^Negative Final     Leukocyte Esterase Urine 10/12/2017 Large* NEG^Negative Final     Source 10/12/2017 Midstream Urine   Final     WBC Urine 10/12/2017 * OTO2^O - 2 /HPF Final     RBC Urine 10/12/2017 >100* OTO2^O - 2 /HPF Final     Bacteria Urine 10/12/2017 Moderate* NEG^Negative /HPF Final     Specimen Description 10/12/2017 Midstream Urine   Final     Culture Micro 10/12/2017 No growth   Final       IMAGING: None    ASSESSMENT and PLAN:    62 year old female with gross hematuria.  The differential diagnosis at this point includes stone disease, infection, vaginal contaminant, urothelial malignancy, renal disorder versus another yet unknown diagnosis.    At this time, recommend proceeding with comprehensive hematuria evaluation to include:  - Can continue Macrobid and Replens  - Mycoplasma and Ureaplasma  - Urinalysis and urine culture to rule out an acute urinary tract infection.   - Urine cytology to look for cells concerning for malignancy.  - CT urogram for upper tract imaging.  - Cystoscopy with the first available urologist to evaluate the interior of the bladder. Follow up for hematuria as recommended by urologist performing cystoscopic evaluation.    Thank you for allowing me to participate in Ms. Jimenez's care. I will keep you updated of her progress, but please do not hesitate to contact me with any questions.    Rosanne Savage PA-C  [facility]rology  30 minutes were spent with the patient today, > 50% in counseling and coordination of care.

## 2017-10-24 NOTE — NURSING NOTE
Chief Complaint   Patient presents with     UTI     Patient is here for frequent UTI's     Hematuria     Patient has gross hematuria when she has a UTI.     Angus Haro LPN 2:25 PM October 24, 2017

## 2017-10-24 NOTE — PATIENT INSTRUCTIONS
1. Urinalysis and culture  2. Mycoplasm, Ureaplasm  3. CT A/P w/wo contrast. - CT scan: Please call [phone] to schedule this at [facility].   4. Cystoscopy with first available urologist  5. Hydrate to keep the urine dilute.   6. Lifestyle and hygiene modifications: wiping front to back, wearing cotton breathable underwear, voiding before and after intercourse to flush the urethra, minimizing baths and opting for showers, and appropriate perineal hygiene.     Below is a list of foods that can irritate the bladder:      Caffeinated soft drinks.    Coffee.    Tea.    Chocolate.    Tomato-based foods.    Acidic juices and fruits.    Alcohol.    Carbonated drinks.    Aspartame/Nutrasweet.

## 2017-10-25 LAB
BACTERIA SPEC CULT: NORMAL
Lab: NORMAL
SPECIMEN SOURCE: NORMAL

## 2017-10-31 LAB
BACTERIA SPEC CULT: NORMAL
BACTERIA SPEC CULT: NORMAL
Lab: NORMAL
Lab: NORMAL
SPECIMEN SOURCE: NORMAL
SPECIMEN SOURCE: NORMAL

## 2017-11-22 ENCOUNTER — HOSPITAL ENCOUNTER (OUTPATIENT)
Dept: CT IMAGING | Facility: CLINIC | Age: 62
Discharge: HOME | End: 2017-11-22
Attending: PHYSICIAN ASSISTANT | Admitting: PHYSICIAN ASSISTANT
Payer: COMMERCIAL

## 2017-11-22 DIAGNOSIS — N30.01 ACUTE CYSTITIS WITH HEMATURIA: ICD-10-CM

## 2017-11-22 PROCEDURE — 74178 CT ABD&PLV WO CNTR FLWD CNTR: CPT

## 2017-11-22 PROCEDURE — 25000128 H RX IP 250 OP 636: Performed by: PHYSICIAN ASSISTANT

## 2017-11-22 RX ORDER — IOPAMIDOL 755 MG/ML
500 INJECTION, SOLUTION INTRAVASCULAR ONCE
Status: COMPLETED | OUTPATIENT
Start: 2017-11-22 | End: 2017-11-22

## 2017-11-22 RX ADMIN — SODIUM CHLORIDE 65 ML: 9 INJECTION, SOLUTION INTRAVENOUS at 13:44

## 2017-11-22 RX ADMIN — IOPAMIDOL 100 ML: 755 INJECTION, SOLUTION INTRAVENOUS at 13:44

## 2017-11-27 DIAGNOSIS — R31.9 HEMATURIA: Primary | ICD-10-CM

## 2017-11-28 ENCOUNTER — OFFICE VISIT (OUTPATIENT)
Dept: UROLOGY | Facility: CLINIC | Age: 62
End: 2017-11-28
Payer: COMMERCIAL

## 2017-11-28 VITALS
SYSTOLIC BLOOD PRESSURE: 120 MMHG | HEART RATE: 80 BPM | BODY MASS INDEX: 33.66 KG/M2 | HEIGHT: 63 IN | WEIGHT: 190 LBS | DIASTOLIC BLOOD PRESSURE: 80 MMHG

## 2017-11-28 DIAGNOSIS — R31.0 GROSS HEMATURIA: ICD-10-CM

## 2017-11-28 LAB
ALBUMIN UR-MCNC: NEGATIVE MG/DL
APPEARANCE UR: CLEAR
BILIRUB UR QL STRIP: NEGATIVE
COLOR UR AUTO: YELLOW
GLUCOSE UR STRIP-MCNC: NEGATIVE MG/DL
HGB UR QL STRIP: NEGATIVE
KETONES UR STRIP-MCNC: NEGATIVE MG/DL
LEUKOCYTE ESTERASE UR QL STRIP: NEGATIVE
NITRATE UR QL: NEGATIVE
PH UR STRIP: 7 PH (ref 5–7)
SOURCE: NORMAL
SP GR UR STRIP: 1.01 (ref 1–1.03)
UROBILINOGEN UR STRIP-ACNC: 0.2 EU/DL (ref 0.2–1)

## 2017-11-28 PROCEDURE — 52000 CYSTOURETHROSCOPY: CPT | Performed by: UROLOGY

## 2017-11-28 PROCEDURE — 99207 ZZC NO CHARGE LOS: CPT | Performed by: UROLOGY

## 2017-11-28 PROCEDURE — 81003 URINALYSIS AUTO W/O SCOPE: CPT | Mod: QW | Performed by: UROLOGY

## 2017-11-28 PROCEDURE — 88112 CYTOPATH CELL ENHANCE TECH: CPT | Performed by: UROLOGY

## 2017-11-28 ASSESSMENT — PAIN SCALES - GENERAL: PAINLEVEL: NO PAIN (0)

## 2017-11-28 NOTE — NURSING NOTE
Prior to the start of the procedure and with procedural staff participation, I verbally confirmed the patient s identity using two indicators, relevant allergies, that the procedure was appropriate and matched the consent or emergent situation, and that the correct equipment/implants were available. Immediately prior to starting the procedure I conducted the Time Out with the procedural staff and re-confirmed the patient s name, procedure, and site/side. (The Joint Commission universal protocol was followed.)  Yes    Sedation (Moderate or Deep): None    Kristin was prepped with Betadine and 2 % lidocaine instilled into urethra. Judy Fernandes LPN

## 2017-11-28 NOTE — PATIENT INSTRUCTIONS

## 2017-11-28 NOTE — PROGRESS NOTES
Pre-procedure diagnosis:  Gross Hematuria  Post-procedure diagnosis: Bladder lesion - 1-2mm  Procedure performed:  Cystourethroscopy  Surgeon:    Nikita Jackson MD   Anesthesia:    Local    Indications for procedure:   Kristin Jimenez is a 62 year old female with a history of .    CT Urogram 11/22/2017  IMPRESSION:   1. No cause for hematuria is identified. No urinary calculi or urinary  tract masses are seen.  2. 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  3. The appendix is mildly thickened, measuring 0.9 cm in diameter,  with only minimal associated periappendiceal inflammatory stranding.  An early or mild appendicitis cannot be excluded from this appearance.  Clinical correlation and follow-up are recommended.  4. Colonic diverticulosis, without evidence for diverticulitis.    Description of procedure:   After fully informed, voluntary consent was obtained, the patient was brought into the procedure room, identified and placed in a dorsal lithotomy position on the cystoscopy table.  The vagina/introitus were prepped with betadyne and draped in a sterile fashion.  Urojet lidocaine gel was introduced.  A 15F flexible cystoscope was inserted into the urethra, and the bladder and urethra were examined in a systematic manner.  The patient tolerated the procedure well and there were no complications.      Findings:  External exam revealed no cystocele and Grade III rectocele.   Cystoscopy then showed the urethra to be normal in appearance with coaptation. The bladder itself was completely surveyed.  The ureteric orifices were normal in position and number and effluxing clear urine.  There was no trabeculation.  There were no stones or diverticula identifed. No obvious high-grade tumors were noted, but there was a 1-2 mm raised erythematous lesion noted lateral to right ureteral orifice. This was papillary in appearance.      Assessment/Plan:   Kristin Jimenez is a 62 year old female with a history of gross hematuria, now with erythematous, raised lesion noted on cystoscopy. Will plan to have her follow-up for cystoscopy with bladder biopsy in the office in the next few weeks. Hold ASA for at least 7 days prior to biopsy. Risks/benefits of this were discussed and she would like to schedule in the near future. Will also send cytology from today's urine specimen.    Nikita Jackson MD  Urology  [facility]  Clinic Phone [phone]

## 2017-11-28 NOTE — MR AVS SNAPSHOT
"              After Visit Summary   11/28/2017    Kristin Jimenez    MRN: [id number]           Patient Information     Date Of Birth          1955        Visit Information        Provider Department      11/28/2017 2:30 PM Nikita Jackson MD;  [facility] [place]        Today's Diagnoses     Gross hematuria          Care Instructions         AFTER YOUR CYSTOSCOPY         You have just completed a cystoscopy, or \"cysto\", which allowed your physician to learn more about your bladder (or to remove a stent placed after surgery). We suggest that you continue to avoid caffeine, fruit juice, and alcohol for the next 24 hours, however, you are encouraged to return to your normal activities.       A few things that are considered normal after your cystoscopy:    * small amount of bleeding (or spotting) that clears within the next 24 hours    * slight burning sensation with urination    * sensation to of needing to avoid more frequently    * the feeling of \"air\" in your urine    * mild discomfort that is relieved with Acetaminophen (Example:Tylenol)        Please contact our office promptly if you:    * develop a fever above 101 degrees    * are unable to urinate, during non-office hours seek Medical Attention.     AFTER YOUR CYSTOSCOPY        You have just completed a cystoscopy, or \"cysto\", which allowed your physician to learn more about your bladder (or to remove a stent placed after surgery). We suggest that you continue to avoid caffeine, fruit juice, and alcohol for the next 24 hours, however, you are encouraged to return to your normal activities.         A few things that are considered normal after your cystoscopy:     * Small amount of bleeding (or spotting) that clears within the next 24 hours     * Slight burning sensation with urination     * Sensation to of needing to avoid more frequently     * The feeling of \"air\" in your urine     * Mild " discomfort that is relieved with Tylenol        Please contact our office promptly if you:     * Develop a fever above 101 degrees     * Are unable to urinate     * Develop bright red blood that does not stop     * Severe pain or swelling         Please contact our office with any concerns or questions @[facility].          Follow-ups after your visit        Your next 10 appointments already scheduled     Dec 18, 2017  9:00 AM CST   Sonography/Biopsy with UA BX ROOM   [facility] [place] ([facility] [place])    [address]  [address]  [facility] [address] [phone]              Who to contact     If you have questions or need follow up information about today's clinic visit or your schedule please contact [facility] [place] directly at [phone].  Normal or non-critical lab and imaging results will be communicated to you by MyChart, letter or phone within 4 business days after the clinic has received the results. If you do not hear from us within 7 days, please contact the clinic through MyChart or phone. If you have a critical or abnormal lab result, we will notify you by phone as soon as possible.  Submit refill requests through [facility] or call your pharmacy and they will forward the refill request to us. Please allow 3 business days for your refill to be completed.          Additional Information About Your Visit        [facility] Information     [facility] gives you secure access to your electronic health record. If you see a primary care provider, you can also send messages to your care team and make appointments. If you have questions, please call your primary care clinic.  If you do not have a primary care provider, please call [phone] and they will assist you.        Care EveryWhere ID     This is your Care EveryWhere ID. This could be used by other organizations to access your [facility] medical records  [id number]       "  Your Vitals Were     Pulse Height Last Period BMI (Body Mass Index)          80 1.6 m (5' 3\") 10/20/2003 33.66 kg/m2         Blood Pressure from Last 3 Encounters:   11/28/17 120/80   10/24/17 118/76   10/12/17 112/74    Weight from Last 3 Encounters:   11/28/17 86.2 kg (190 lb)   10/24/17 87.1 kg (192 lb)   10/12/17 86.2 kg (190 lb)              We Performed the Following     CYSTOURETHROSCOPY     Cytology non gyn [[id number]]     UA without Microscopic        Primary Care Provider Office Phone # Fax #    Louis Nixon [phone]       [address] [place] [address]        Equal Access to Services     MERRY COPE : Hadii aad ku hadasho Soomaali, waaxda luqadaha, qaybta kaalmada adeegyada, margaret shaw . So [facility] [phone].    ATENCIÓN: Si habla español, tiene a meyers disposición servicios gratuitos de asistencia lingüística. Llame al [phone].    We comply with applicable federal civil rights laws and [place] laws. We do not discriminate on the basis of race, color, national origin, age, disability, sex, sexual orientation, or gender identity.            Thank you!     Thank you for choosing [facility] [place]  for your care. Our goal is always to provide you with excellent care. Hearing back from our patients is one way we can continue to improve our services. Please take a few minutes to complete the written survey that you may receive in the mail after your visit with us. Thank you!             Your Updated Medication List - Protect others around you: Learn how to safely use, store and throw away your medicines at www.disposemymeds.org.          This list is accurate as of: 11/28/17  3:15 PM.  Always use your most recent med list.                   Brand Name Dispense Instructions for use Diagnosis    FLUoxetine 20 MG capsule    PROzac    270 capsule    Take 3 capsules (60 mg) by mouth daily    Major depressive " disorder, single episode, moderate (H)       metroNIDAZOLE 0.75 % topical gel    METROGEL    135 g    Apply topically 2 times daily    Rosacea, Routine general medical examination at a health care facility       nitroFURantoin 50 MG capsule    MACRODANTIN    90 capsule    Take 1 capsule (50 mg) by mouth daily For 3 months for UTI prevention    Recurrent cystitis       omeprazole 20 MG CR capsule    priLOSEC    90 capsule    TAKE ONE CAPSULE BY MOUTH ONCE DAILY    Gastroesophageal reflux disease without esophagitis       replens Gel      Use per package instructions    Vaginal dryness, menopausal

## 2017-11-28 NOTE — LETTER
11/28/2017       RE: Kristin Jimenez  [address] [facility] [address]     Dear Colleague,    Thank you for referring your patient, Kristin Jimenez, to the [facility] [place] at [facility]. Please see a copy of my visit note below.    Pre-procedure diagnosis:  Gross Hematuria  Post-procedure diagnosis: Bladder lesion - 1-2mm  Procedure performed:  Cystourethroscopy  Surgeon:    Nikita Jackson MD   Anesthesia:    Local    Indications for procedure:   Kristin Jimenez is a 62 year old female with a history of .    CT Urogram 11/22/2017  IMPRESSION:   1. No cause for hematuria is identified. No urinary calculi or urinary  tract masses are seen.  2. 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  3. The appendix is mildly thickened, measuring 0.9 cm in diameter,  with only minimal associated periappendiceal inflammatory stranding.  An early or mild appendicitis cannot be excluded from this appearance.  Clinical correlation and follow-up are recommended.  4. Colonic diverticulosis, without evidence for diverticulitis.    Description of procedure:   After fully informed, voluntary consent was obtained, the patient was brought into the procedure room, identified and placed in a dorsal lithotomy position on the cystoscopy table.  The vagina/introitus were prepped with betadyne and draped in a sterile fashion.  Urojet lidocaine gel was introduced.  A 15F flexible cystoscope was inserted into the urethra, and the bladder and urethra were examined in a systematic manner.  The patient tolerated the procedure well and there were no complications.      Findings:  External exam revealed no cystocele and Grade III rectocele.   Cystoscopy then showed the urethra to be normal in appearance with coaptation. The bladder itself was completely surveyed.  The ureteric  orifices were normal in position and number and effluxing clear urine.  There was no trabeculation.  There were no stones or diverticula identifed. No obvious high-grade tumors were noted, but there was a 1-2 mm raised erythematous lesion noted lateral to right ureteral orifice. This was papillary in appearance.     Assessment/Plan:   Kristin Jimenez is a 62 year old female with a history of gross hematuria, now with erythematous, raised lesion noted on cystoscopy. Will plan to have her follow-up for cystoscopy with bladder biopsy in the office in the next few weeks. Hold ASA for at least 7 days prior to biopsy. Risks/benefits of this were discussed and she would like to schedule in the near future. Will also send cytology from today's urine specimen.    Nikita Jackson MD  Urology  [facility]  Clinic Phone [phone]

## 2017-11-29 LAB — COPATH REPORT: NORMAL

## 2017-12-18 ENCOUNTER — OFFICE VISIT (OUTPATIENT)
Dept: UROLOGY | Facility: CLINIC | Age: 62
End: 2017-12-18
Payer: COMMERCIAL

## 2017-12-18 VITALS
HEART RATE: 80 BPM | WEIGHT: 190 LBS | SYSTOLIC BLOOD PRESSURE: 138 MMHG | DIASTOLIC BLOOD PRESSURE: 64 MMHG | HEIGHT: 63 IN | BODY MASS INDEX: 33.66 KG/M2

## 2017-12-18 DIAGNOSIS — N32.9 LESION OF BLADDER: Primary | ICD-10-CM

## 2017-12-18 DIAGNOSIS — N30.90 RECURRENT CYSTITIS: ICD-10-CM

## 2017-12-18 PROCEDURE — 52224 CYSTOSCOPY AND TREATMENT: CPT | Performed by: UROLOGY

## 2017-12-18 PROCEDURE — 88305 TISSUE EXAM BY PATHOLOGIST: CPT | Performed by: UROLOGY

## 2017-12-18 PROCEDURE — 81003 URINALYSIS AUTO W/O SCOPE: CPT | Performed by: UROLOGY

## 2017-12-18 RX ORDER — NITROFURANTOIN MACROCRYSTALS 50 MG/1
50 CAPSULE ORAL DAILY
Qty: 90 CAPSULE | Refills: 0 | Status: SHIPPED | OUTPATIENT
Start: 2017-12-18 | End: 2018-06-18

## 2017-12-18 RX ORDER — CIPROFLOXACIN 500 MG/1
500 TABLET, FILM COATED ORAL 2 TIMES DAILY
Qty: 3 TABLET | Refills: 0 | Status: SHIPPED | OUTPATIENT
Start: 2017-12-18 | End: 2017-12-26

## 2017-12-18 ASSESSMENT — PAIN SCALES - GENERAL
PAINLEVEL: NO PAIN (0)
PAINLEVEL: NO PAIN (0)

## 2017-12-18 NOTE — PATIENT INSTRUCTIONS
"     AFTER YOUR CYSTOSCOPY         You have just completed a cystoscopy, or \"cysto\", which allowed your physician to learn more about your bladder (or to remove a stent placed after surgery). We suggest that you continue to avoid caffeine, fruit juice, and alcohol for the next 24 hours, however, you are encouraged to return to your normal activities.       A few things that are considered normal after your cystoscopy:    * small amount of bleeding (or spotting) that clears within the next 24 hours    * slight burning sensation with urination    * sensation to of needing to avoid more frequently    * the feeling of \"air\" in your urine    * mild discomfort that is relieved with Tylonol        Please contact our office promptly if you:    * develop a fever above 101 degrees    * are unable to urinate    * develop bright red blood that does not stop    * severe pain or swelling        And of course, please contact our office with any concerns or questions [phone]      "

## 2017-12-18 NOTE — NURSING NOTE
Chief Complaint   Patient presents with     Cystoscopy     With Bladder Biopsy-Erythematous Lesion      .  Prior to the start of the procedure and with procedural staff participation, I verbally confirmed the patient s identity using two indicators, relevant allergies, that the procedure was appropriate and matched the consent or emergent situation, and that the correct equipment/implants were available. Immediately prior to starting the procedure I conducted the Time Out with the procedural staff and re-confirmed the patient s name, procedure, and site/side. (The Joint Commission universal protocol was followed.)  Yes    Sedation (Moderate or Deep): None    Patricia Vick LPN

## 2017-12-18 NOTE — MR AVS SNAPSHOT
"              After Visit Summary   12/18/2017    Kristin Jimenez    MRN: [id number]           Patient Information     Date Of Birth          1955        Visit Information        Provider Department      12/18/2017 9:00 AM Nikita Jackson MD; UA CAUT EQ; UA CYF [facility] [place]        Today's Diagnoses     Lesion of bladder    -  1    Recurrent cystitis          Care Instructions         AFTER YOUR CYSTOSCOPY         You have just completed a cystoscopy, or \"cysto\", which allowed your physician to learn more about your bladder (or to remove a stent placed after surgery). We suggest that you continue to avoid caffeine, fruit juice, and alcohol for the next 24 hours, however, you are encouraged to return to your normal activities.       A few things that are considered normal after your cystoscopy:    * small amount of bleeding (or spotting) that clears within the next 24 hours    * slight burning sensation with urination    * sensation to of needing to avoid more frequently    * the feeling of \"air\" in your urine    * mild discomfort that is relieved with Tylonol        Please contact our office promptly if you:    * develop a fever above 101 degrees    * are unable to urinate    * develop bright red blood that does not stop    * severe pain or swelling        And of course, please contact our office with any concerns or questions [phone]              Follow-ups after your visit        Follow-up notes from your care team     Return in about 3 months (around 3/18/2018).      Your next 10 appointments already scheduled     Dec 26, 2017  3:00 PM CST   [facility] Visit with Madyson [facility]ada PA-C   [facility]    [address] [place] [address] [phone]           Bring a current list of meds and any records pertaining to this visit. For Physicals, please bring immunization records and any " "forms needing to be filled out. Please arrive 10 minutes early to complete paperwork.            Mar 19, 2018 10:00 AM CDT   Return Visit with Rosanne Savage PA-C   [facility] [place] ([facility] [place])    [address]  [address]  [place] [id number] [phone]              Future tests that were ordered for you today     Open Future Orders        Priority Expected Expires Ordered    CYSTOURETHROSCOPY W BIOPSY Routine  2/1/2018 12/18/2017            Who to contact     If you have questions or need follow up information about today's clinic visit or your schedule please contact [facility] [place] directly at [phone].  Normal or non-critical lab and imaging results will be communicated to you by Space Aparthart, letter or phone within 4 business days after the clinic has received the results. If you do not hear from us within 7 days, please contact the clinic through Digital Royaltyt or phone. If you have a critical or abnormal lab result, we will notify you by phone as soon as possible.  Submit refill requests through TerraPower or call your pharmacy and they will forward the refill request to us. Please allow 3 business days for your refill to be completed.          Additional Information About Your Visit        TerraPower Information     TerraPower gives you secure access to your electronic health record. If you see a primary care provider, you can also send messages to your care team and make appointments. If you have questions, please call your primary care clinic.  If you do not have a primary care provider, please call [phone] and they will assist you.        Care EveryWhere ID     This is your Care EveryWhere ID. This could be used by other organizations to access your [place] medical records  [id number]        Your Vitals Were     Pulse Height Last Period BMI (Body Mass Index)          80 1.6 m (5' 3\") 10/20/2003 33.66 kg/m2         " Blood Pressure from Last 3 Encounters:   12/18/17 138/64   11/28/17 120/80   10/24/17 118/76    Weight from Last 3 Encounters:   12/18/17 86.2 kg (190 lb)   11/28/17 86.2 kg (190 lb)   10/24/17 87.1 kg (192 lb)              We Performed the Following     UA without Microscopic          Today's Medication Changes          These changes are accurate as of: 12/18/17  9:35 AM.  If you have any questions, ask your nurse or doctor.               Start taking these medicines.        Dose/Directions    ciprofloxacin 500 MG tablet   Commonly known as:  CIPRO   Used for:  Lesion of bladder   Started by:  Nikita Jackson MD        Dose:  500 mg   Take 1 tablet (500 mg) by mouth 2 times daily   Quantity:  3 tablet   Refills:  0            Where to get your medicines      These medications were sent to [facility] - [place], [place] - [address]  [address], [facility] [address]     Phone:  [phone]     ciprofloxacin 500 MG tablet    nitroFURantoin 50 MG capsule                Primary Care Provider Office Phone # Fax #    Louis Nixon [phone]       [address] [place] [address]        Equal Access to Services     MERRY COPE AH: Hadii yolanda dotson hadasho Soomaali, waaxda luqadaha, qaybta kaalmada adeegyada, margaret kim. So [facility] [phone].    ATENCIÓN: Si habla español, tiene a meyers disposición servicios gratuitos de asistencia lingüística. Llame al [phone].    We comply with applicable federal civil rights laws and [place] laws. We do not discriminate on the basis of race, color, national origin, age, disability, sex, sexual orientation, or gender identity.            Thank you!     Thank you for choosing [facility] [place]  for your care. Our goal is always to provide you with excellent care. Hearing back from our patients is one way we can continue to improve our services. Please take a  few minutes to complete the written survey that you may receive in the mail after your visit with us. Thank you!             Your Updated Medication List - Protect others around you: Learn how to safely use, store and throw away your medicines at www.disposemymeds.org.          This list is accurate as of: 12/18/17  9:35 AM.  Always use your most recent med list.                   Brand Name Dispense Instructions for use Diagnosis    ciprofloxacin 500 MG tablet    CIPRO    3 tablet    Take 1 tablet (500 mg) by mouth 2 times daily    Lesion of bladder       FLUoxetine 20 MG capsule    PROzac    270 capsule    Take 3 capsules (60 mg) by mouth daily    Major depressive disorder, single episode, moderate (H)       metroNIDAZOLE 0.75 % topical gel    METROGEL    135 g    Apply topically 2 times daily    Rosacea, Routine general medical examination at a health care facility       nitroFURantoin 50 MG capsule    MACRODANTIN    90 capsule    Take 1 capsule (50 mg) by mouth daily For 3 months for UTI prevention    Recurrent cystitis       omeprazole 20 MG CR capsule    priLOSEC    90 capsule    TAKE ONE CAPSULE BY MOUTH ONCE DAILY    Gastroesophageal reflux disease without esophagitis       replens Gel      Use per package instructions    Vaginal dryness, menopausal

## 2017-12-18 NOTE — PROGRESS NOTES
Pre-procedure diagnosis:  Bladder lesion  Post-procedure diagnosis: Bladder lesion  Procedure performed:  Cystoscopy with bladder biopsy and fulguration  Surgeon:    Nikita Jackson MD   Anesthesia:    Local    Indications for procedure:   Kristin Jimenez is a 62 year old female with a history of gross hematuria. Found to have small bladder lesion on last cysto. Here for biopsy.    Description of procedure:   After fully informed, voluntary consent was obtained, the patient was brought into the procedure room, identified and placed in a dorsal lithotomy position on the cystoscopy table.  The vagina/introitus were prepped with betadyne and draped in a sterile fashion.  Urojet lidocaine gel was introduced.  A 15F flexible cystoscope was inserted into the urethra, and the bladder and urethra were examined in a systematic manner. Lesion was noted and biopsied with forceps. Area fulgurated with Bugbee electrocautery. The patient tolerated the procedure well and there were no complications.      Findings:  External exam revealed no cystocele and Grade III rectocele rectocele.   Cystoscopy then showed the urethra to be normal  in appearance with normal coaptation. The bladder itself was completely surveyed.  The ureteric orifices were normal in position and number and effluxing clear urine.  There was no trabeculation. Previously noted lesion 1-2mm in size adjacent to right ureteral orifice. Biopsy taken and area fulgurated. All abnormal appearing mucosa was biopsied of fulgurated today.    Assessment/Plan:   Kristin Jimenez is a 62 year old female with a history of bladder lesion. Follow-up for path review - call with results. Will refill nitrofurantoin for recurrent UTI prophylaxis. Cipro x 3 tabs for procedural prophylaxis.

## 2017-12-20 LAB — COPATH REPORT: NORMAL

## 2017-12-26 ENCOUNTER — OFFICE VISIT (OUTPATIENT)
Dept: FAMILY MEDICINE | Facility: CLINIC | Age: 62
End: 2017-12-26
Payer: COMMERCIAL

## 2017-12-26 VITALS
HEIGHT: 63 IN | SYSTOLIC BLOOD PRESSURE: 134 MMHG | HEART RATE: 80 BPM | WEIGHT: 186.25 LBS | OXYGEN SATURATION: 100 % | BODY MASS INDEX: 33 KG/M2 | TEMPERATURE: 97.8 F | DIASTOLIC BLOOD PRESSURE: 72 MMHG

## 2017-12-26 DIAGNOSIS — L71.9 ROSACEA: ICD-10-CM

## 2017-12-26 DIAGNOSIS — F32.1 MAJOR DEPRESSIVE DISORDER, SINGLE EPISODE, MODERATE (H): ICD-10-CM

## 2017-12-26 PROCEDURE — 99214 OFFICE O/P EST MOD 30 MIN: CPT | Performed by: PHYSICIAN ASSISTANT

## 2017-12-26 RX ORDER — METRONIDAZOLE 7.5 MG/G
GEL TOPICAL 2 TIMES DAILY
Qty: 135 G | Refills: 3 | Status: SHIPPED | OUTPATIENT
Start: 2017-12-26 | End: 2018-09-18

## 2017-12-26 ASSESSMENT — ANXIETY QUESTIONNAIRES
7. FEELING AFRAID AS IF SOMETHING AWFUL MIGHT HAPPEN: NOT AT ALL
3. WORRYING TOO MUCH ABOUT DIFFERENT THINGS: NOT AT ALL
2. NOT BEING ABLE TO STOP OR CONTROL WORRYING: NOT AT ALL
GAD7 TOTAL SCORE: 0
5. BEING SO RESTLESS THAT IT IS HARD TO SIT STILL: NOT AT ALL
6. BECOMING EASILY ANNOYED OR IRRITABLE: NOT AT ALL
IF YOU CHECKED OFF ANY PROBLEMS ON THIS QUESTIONNAIRE, HOW DIFFICULT HAVE THESE PROBLEMS MADE IT FOR YOU TO DO YOUR WORK, TAKE CARE OF THINGS AT HOME, OR GET ALONG WITH OTHER PEOPLE: NOT DIFFICULT AT ALL
1. FEELING NERVOUS, ANXIOUS, OR ON EDGE: NOT AT ALL

## 2017-12-26 ASSESSMENT — PATIENT HEALTH QUESTIONNAIRE - PHQ9
SUM OF ALL RESPONSES TO PHQ QUESTIONS 1-9: 0
5. POOR APPETITE OR OVEREATING: NOT AT ALL

## 2017-12-26 NOTE — PROGRESS NOTES
SUBJECTIVE:   Kristin Jimenez is a 62 year old female who presents to clinic today for the following health issues:    Major Depressive Disorder:  The patient presents to the clinic today requesting a refill to her Prozac 20mg QD. She is compliant on her Prozac 60mg QD.      PHQ-2 Score:   PHQ-2 ( 1999 Pfizer) 9/14/2017 7/6/2017   Q1: Little interest or pleasure in doing things 0 0   Q2: Feeling down, depressed or hopeless 0 0   PHQ-2 Score 0 0     KRISTI-7 Score:  KRISTI-7 SCORE 6/6/2016 7/6/2017 12/26/2017   Total Score - - -   Total Score 0 0 0     GERD:  Compliant on Omeprazole. Stable, no acute issues.     Rosacea:  Mentions that this has been somewhat stable. Occasionally flares up with alcohol, spicy food, and cold weather. Compliant on Metronidazole.     Problem list and histories reviewed & adjusted, as indicated.  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 Yrs     HC REVISE MEDIAN N/CARPAL TUNNEL SURG  12/02    bilateral - dr acuna     SURGICAL HISTORY OF -   6/07    Dr Perales - Lt hip CARMELINA     SURGICAL HISTORY OF -   6/09    Dr Perales - Rt Hip CARMELINA       Social History   Substance Use Topics     Smoking status: Never Smoker     Smokeless tobacco: Never Used     Alcohol use 1.0 oz/week     2 Standard drinks or equivalent per week      Comment: 2 glass of wine per week     Family History    Problem Relation Age of Onset     CANCER Father       Lung CA - SMOKER     Lipids Sister      Lipids Brother      CEREBROVASCULAR DISEASE Maternal Grandmother      C.A.D. Maternal Grandfather      Prostate Cancer Maternal Grandfather      Alzheimer Disease Paternal Grandmother      Prostate Cancer Paternal Grandfather      Eye Disorder Mother      glaucoma     Cancer - colorectal Mother      AGE 71 WITH METS liver cancer.  Oct. 2006     Eye Disorder Sister      histoplasmosis     Eye Disorder Brother      histoplasmosis         Current Outpatient Prescriptions   Medication Sig Dispense Refill     FLUoxetine (PROZAC) 20 MG capsule Take 3 capsules (60 mg) by mouth daily 270 capsule 1     metroNIDAZOLE (METROGEL) 0.75 % topical gel Apply topically 2 times daily 135 g 3     nitroFURantoin (MACRODANTIN) 50 MG capsule Take 1 capsule (50 mg) by mouth daily For 3 months for UTI prevention 90 capsule 0     Vaginal Lubricant (REPLENS) GEL Use per package instructions       omeprazole (PRILOSEC) 20 MG CR capsule TAKE ONE CAPSULE BY MOUTH ONCE DAILY 90 capsule 3     [DISCONTINUED] FLUoxetine (PROZAC) 20 MG capsule Take 3 capsules (60 mg) by mouth daily 270 capsule 1     Allergies   Allergen Reactions     Sulfa Drugs      Rash     Reviewed and updated as needed this visit by clinical staff  Tobacco  Allergies  Meds  Problems  Med Hx  Surg Hx  Fam Hx  Soc Hx        Reviewed and updated as needed this visit by Provider  Tobacco  Allergies  Meds  Problems  Med Hx  Surg Hx  Fam Hx  Soc Hx          ROS:  Constitutional, HEENT, cardiovascular, pulmonary, GI, , musculoskeletal, neuro, skin, endocrine and psych systems are negative, except as otherwise noted.    This document serves as a record of the services and decisions personally performed and made by Madyson Parada, MS, PA-C. It was created on his behalf by Tod Rangel, a trained medical scribe. The creation of this document is based the provider's  "statements to the medical scribe.  Tod Rangel December 26, 2017 3:10 PM  OBJECTIVE:   /72  Pulse 80  Temp 97.8  F (36.6  C) (Tympanic)  Ht 5' 3\" (1.6 m)  Wt 186 lb 4 oz (84.5 kg)  LMP 10/20/2003  SpO2 100%  BMI 32.99 kg/m2  Body mass index is 32.99 kg/(m^2).  GENERAL: healthy, alert and no distress  RESP: lungs clear to auscultation - no rales, rhonchi or wheezes  SKIN: no suspicious lesions or rashes  PSYCH: mentation appears normal, affect normal/bright    Diagnostic Test Results:  none     ASSESSMENT/PLAN:   Kristin was seen today for recheck medication.    Diagnoses and all orders for this visit:    Major depressive disorder, single episode, moderate        - Reviewed latest PHQ and KRISTI scores which were normal. Patient's MDD has been stable with no acute concerns. Presents today for a refill. Will have her follow up in 6 months with an over the phone KRISTI and PHQ - if stable okay to fill for an additional 6 months and have pt be seen every 12 months.  -     FLUoxetine (PROZAC) 20 MG capsule; Take 3 capsules (60 mg) by mouth daily    Rosacea        - Controlled, no acute issues. Continue current medication. Refill was provided today.   -     metroNIDAZOLE (METROGEL) 0.75 % topical gel; Apply topically 2 times daily    The information in this document, created by the medical scribe for me, accurately reflects the services I personally performed and the decisions made by me. I have reviewed and approved this document for accuracy prior to leaving the patient care area.    Madyson Parada PA-C  [facility] [place]  "

## 2017-12-26 NOTE — MR AVS SNAPSHOT
After Visit Summary   12/26/2017    Kristin Jimenez    MRN: [id number]           Patient Information     Date Of Birth          1955        Visit Information        Provider Department      12/26/2017 3:00 PM Madyson Parada PA-C [facility]        Today's Diagnoses     Major depressive disorder, single episode, moderate        Rosacea           Follow-ups after your visit        Your next 10 appointments already scheduled     Mar 19, 2018 10:00 AM CDT   Return Visit with Rosanne Savage PA-C   [facility] [place] ([facility])    [address]  [address]  [facility] [address] [phone]              Who to contact     If you have questions or need follow up information about today's clinic visit or your schedule please contact [facility] directly at [phone].  Normal or non-critical lab and imaging results will be communicated to you by MyChart, letter or phone within 4 business days after the clinic has received the results. If you do not hear from us within 7 days, please contact the clinic through MyChart or phone. If you have a critical or abnormal lab result, we will notify you by phone as soon as possible.  Submit refill requests through Netcontinuum or call your pharmacy and they will forward the refill request to us. Please allow 3 business days for your refill to be completed.          Additional Information About Your Visit        MyChart Information     Netcontinuum gives you secure access to your electronic health record. If you see a primary care provider, you can also send messages to your care team and make appointments. If you have questions, please call your primary care clinic.  If you do not have a primary care provider, please call [phone] and they will assist you.        Care EveryWhere ID     This is your Care EveryWhere ID. This could be used by other organizations to access your  "[place] medical records  [id number]        Your Vitals Were     Pulse Temperature Height Last Period Pulse Oximetry BMI (Body Mass Index)    80 97.8  F (36.6  C) (Tympanic) 5' 3\" (1.6 m) 10/20/2003 100% 32.99 kg/m2       Blood Pressure from Last 3 Encounters:   12/26/17 134/72   12/18/17 138/64   11/28/17 120/80    Weight from Last 3 Encounters:   12/26/17 186 lb 4 oz (84.5 kg)   12/18/17 190 lb (86.2 kg)   11/28/17 190 lb (86.2 kg)              Today, you had the following     No orders found for display         Where to get your medicines      These medications were sent to [id number] - [place], [place] - [address]  [address], [place] [address]    Hours:  formerly Maritza Landaverde Phone:  [phone]     FLUoxetine 20 MG capsule    metroNIDAZOLE 0.75 % topical gel          Primary Care Provider Office Phone # Fax #    Louis Nixon [phone]       [address] [facility] [address]        Equal Access to Services     [facility] AH: Hadii yolanda dotson hadasho Soomaali, waaxda luqadaha, qaybta kaalmada adeegyada, margaret kim. So [facility] [phone].    ATENCIÓN: Si habla español, tiene a meyers disposición servicios gratuitos de asistencia lingüística. [facility] [phone].    We comply with applicable federal civil rights laws and [place] laws. We do not discriminate on the basis of race, color, national origin, age, disability, sex, sexual orientation, or gender identity.            Thank you!     Thank you for choosing [facility]  for your care. Our goal is always to provide you with excellent care. Hearing back from our patients is one way we can continue to improve our services. Please take a few minutes to complete the written survey that you may receive in the mail after your visit with us. Thank you!             Your Updated Medication List - Protect others around you: Learn how to safely use, store and throw away " your medicines at www.disposemymeds.org.          This list is accurate as of: 12/26/17  3:17 PM.  Always use your most recent med list.                   Brand Name Dispense Instructions for use Diagnosis    FLUoxetine 20 MG capsule    PROzac    270 capsule    Take 3 capsules (60 mg) by mouth daily    Major depressive disorder, single episode, moderate (H)       metroNIDAZOLE 0.75 % topical gel    METROGEL    135 g    Apply topically 2 times daily    Rosacea       nitroFURantoin 50 MG capsule    MACRODANTIN    90 capsule    Take 1 capsule (50 mg) by mouth daily For 3 months for UTI prevention    Recurrent cystitis       omeprazole 20 MG CR capsule    priLOSEC    90 capsule    TAKE ONE CAPSULE BY MOUTH ONCE DAILY    Gastroesophageal reflux disease without esophagitis       replens Gel      Use per package instructions    Vaginal dryness, menopausal

## 2017-12-26 NOTE — NURSING NOTE
"Chief Complaint   Patient presents with     Recheck Medication       Initial /72  Pulse 80  Temp 97.8  F (36.6  C) (Tympanic)  Ht 5' 3\" (1.6 m)  Wt 186 lb 4 oz (84.5 kg)  LMP 10/20/2003  SpO2 100%  BMI 32.99 kg/m2 Estimated body mass index is 32.99 kg/(m^2) as calculated from the following:    Height as of this encounter: 5' 3\" (1.6 m).    Weight as of this encounter: 186 lb 4 oz (84.5 kg).  Medication Reconciliation: complete    "

## 2017-12-27 ASSESSMENT — ANXIETY QUESTIONNAIRES: GAD7 TOTAL SCORE: 0

## 2017-12-28 ENCOUNTER — TELEPHONE (OUTPATIENT)
Dept: UROLOGY | Facility: CLINIC | Age: 62
End: 2017-12-28

## 2017-12-28 NOTE — TELEPHONE ENCOUNTER
I called and left VM today. Please call next week to confirm she received results from biopsy. All looks good and nothing further to do at this point.     She can follow-up with Rosanne in March as scheduled.     Thanks,   reg     Called patient and spoke with her. She is now aware of normal results. She will plan to follow-up as scheduled in March.     Patricia Vick LPN

## 2018-02-06 DIAGNOSIS — F32.1 MAJOR DEPRESSIVE DISORDER, SINGLE EPISODE, MODERATE (H): ICD-10-CM

## 2018-02-07 NOTE — TELEPHONE ENCOUNTER
"Requested Prescriptions   Pending Prescriptions Disp Refills     FLUoxetine (PROZAC) 20 MG capsule [Pharmacy Med Name: FLUOXETINE HCL 20MG CAPS] 270 capsule 1    Last Written Prescription Date:  12/26/2017  Last Fill Quantity: 270,  # refills: 1   Last Office Visit with [facility] provider:  12/26/17   Future Office Visit:      Sig: TAKE THREE CAPSULES BY MOUTH EVERY DAY    SSRIs Protocol Passed    2/6/2018 10:06 AM   PHQ-9 SCORE 6/6/2016 12/26/2016 12/26/2017   Total Score - - -   Total Score 0 0 0     KRISTI-7 SCORE 6/6/2016 7/6/2017 12/26/2017   Total Score - - -   Total Score 0 0 0           Passed - PHQ-9 score less than 5 in past 6 months    The PHQ-9 criteria is meant to fail. It requires a PHQ-9 score review         Passed - Patient is age 18 or older       Passed - No active pregnancy on record       Passed - No positive pregnancy test in last 12 months       Passed - Recent (6 mo) or future visit with authorizing provider's specialty    Patient had office visit in the last 6 months or has a visit in the next 30 days with authorizing provider.  See \"Patient Info\" tab in inbasket, or \"Choose Columns\" in Meds & Orders section of the refill encounter.              "

## 2018-02-08 NOTE — TELEPHONE ENCOUNTER
#270 x 1 refill given on 12/26/2017, no pharmacy change  Should have refills on file    Judy Sweeney RN  [place] [facility]

## 2018-03-19 ENCOUNTER — OFFICE VISIT (OUTPATIENT)
Dept: UROLOGY | Facility: CLINIC | Age: 63
End: 2018-03-19
Payer: COMMERCIAL

## 2018-03-19 VITALS
BODY MASS INDEX: 32.96 KG/M2 | DIASTOLIC BLOOD PRESSURE: 68 MMHG | WEIGHT: 186 LBS | SYSTOLIC BLOOD PRESSURE: 120 MMHG | HEIGHT: 63 IN

## 2018-03-19 DIAGNOSIS — Z87.440 PERSONAL HISTORY OF URINARY TRACT INFECTION: ICD-10-CM

## 2018-03-19 DIAGNOSIS — R31.0 GROSS HEMATURIA: Primary | ICD-10-CM

## 2018-03-19 LAB
ALBUMIN UR-MCNC: NEGATIVE MG/DL
APPEARANCE UR: CLEAR
BILIRUB UR QL STRIP: NEGATIVE
COLOR UR AUTO: YELLOW
GLUCOSE UR STRIP-MCNC: NEGATIVE MG/DL
HGB UR QL STRIP: NEGATIVE
KETONES UR STRIP-MCNC: NEGATIVE MG/DL
LEUKOCYTE ESTERASE UR QL STRIP: NEGATIVE
NITRATE UR QL: NEGATIVE
PH UR STRIP: 6 PH (ref 5–7)
SOURCE: NORMAL
SP GR UR STRIP: <=1.005 (ref 1–1.03)
UROBILINOGEN UR STRIP-ACNC: 0.2 EU/DL (ref 0.2–1)

## 2018-03-19 PROCEDURE — 81003 URINALYSIS AUTO W/O SCOPE: CPT | Performed by: PHYSICIAN ASSISTANT

## 2018-03-19 PROCEDURE — 99213 OFFICE O/P EST LOW 20 MIN: CPT | Performed by: PHYSICIAN ASSISTANT

## 2018-03-19 RX ORDER — NITROFURANTOIN 25; 75 MG/1; MG/1
100 CAPSULE ORAL DAILY
Qty: 90 CAPSULE | Refills: 1 | Status: SHIPPED | OUTPATIENT
Start: 2018-03-19 | End: 2018-09-18

## 2018-03-19 ASSESSMENT — PAIN SCALES - GENERAL: PAINLEVEL: NO PAIN (0)

## 2018-03-19 NOTE — MR AVS SNAPSHOT
After Visit Summary   3/19/2018    Kristin Jimenez    MRN: [id number]           Patient Information     Date Of Birth          1955        Visit Information        Provider Department      3/19/2018 10:00 AM Rosanne Savage PA-C [facility]        Today's Diagnoses     Gross hematuria    -  1       Follow-ups after your visit        Your next 10 appointments already scheduled     Aug 21, 2018 10:00 AM CDT   Return Visit with Rosanne Savage PA-C   [facility] ([facility])    [address]  [address]  [facility] [address] [phone]              Who to contact     If you have questions or need follow up information about today's clinic visit or your schedule please contact [facility] directly at [phone].  Normal or non-critical lab and imaging results will be communicated to you by MyChart, letter or phone within 4 business days after the clinic has received the results. If you do not hear from us within 7 days, please contact the clinic through [employer] or phone. If you have a critical or abnormal lab result, we will notify you by phone as soon as possible.  Submit refill requests through [employer] or call your pharmacy and they will forward the refill request to us. Please allow 3 business days for your refill to be completed.          Additional Information About Your Visit        MyChart Information     [employer] gives you secure access to your electronic health record. If you see a primary care provider, you can also send messages to your care team and make appointments. If you have questions, please call your primary care clinic.  If you do not have a primary care provider, please call [phone] and they will assist you.        Care EveryWhere ID     This is your Care EveryWhere ID. This could be used by other organizations to access your  "[place] medical records  [id number]        Your Vitals Were     Height Last Period BMI (Body Mass Index)             1.6 m (5' 3\") 10/20/2003 32.95 kg/m2          Blood Pressure from Last 3 Encounters:   03/19/18 120/68   12/26/17 134/72   12/18/17 138/64    Weight from Last 3 Encounters:   03/19/18 84.4 kg (186 lb)   12/26/17 84.5 kg (186 lb 4 oz)   12/18/17 86.2 kg (190 lb)              We Performed the Following     UA without Microscopic        Primary Care Provider Office Phone # Fax #    Louis Nixon [phone]       [address] [place] [address]        Equal Access to Services     MERRY COPE : Hadii aad ku hadasho Soomaali, waaxda luqadaha, qaybta kaalmada adeegyada, margaret shaw . So [facility] [phone].    ATENCIÓN: Si habla español, tiene a meyers disposición servicios gratuitos de asistencia lingüística. Llame al [phone].    We comply with applicable federal civil rights laws and [place] laws. We do not discriminate on the basis of race, color, national origin, age, disability, sex, sexual orientation, or gender identity.            Thank you!     Thank you for choosing [facility] [place]  for your care. Our goal is always to provide you with excellent care. Hearing back from our patients is one way we can continue to improve our services. Please take a few minutes to complete the written survey that you may receive in the mail after your visit with us. Thank you!             Your Updated Medication List - Protect others around you: Learn how to safely use, store and throw away your medicines at www.disposemymeds.org.          This list is accurate as of 3/19/18 10:35 AM.  Always use your most recent med list.                   Brand Name Dispense Instructions for use Diagnosis    FLUoxetine 20 MG capsule    PROzac    270 capsule    Take 3 capsules (60 mg) by mouth daily    Major depressive disorder, single " episode, moderate (H)       metroNIDAZOLE 0.75 % topical gel    METROGEL    135 g    Apply topically 2 times daily    Rosacea       nitroFURantoin 50 MG capsule    MACRODANTIN    90 capsule    Take 1 capsule (50 mg) by mouth daily For 3 months for UTI prevention    Recurrent cystitis       omeprazole 20 MG CR capsule    priLOSEC    90 capsule    TAKE ONE CAPSULE BY MOUTH ONCE DAILY    Gastroesophageal reflux disease without esophagitis       replens Gel      Use per package instructions    Vaginal dryness, menopausal

## 2018-03-19 NOTE — LETTER
3/19/2018       RE: Kristin Jimenez  [address]  [facility] [address]     Dear Colleague,    Thank you for referring your patient, Kristin Jimenez, to the [facility] [place] [facility]. Please see a copy of my visit note below.    CC: 3 month f/u gross hematuria and rUTIs    Kristin Jimenez is a pleasant 62 year old female with a history of gross hematuria. Found to have small bladder lesion on Nov 2017 cysto. Biopsy in Dec with Dr. Jackson was a hemangioma. Has been on maintenance dosing of nitrofurantoin. Has done great in the interim from this perspective and no rUTIs.    Has two big trips planned in the coming months and hoping to stay on maintenance.     Past Medical History:   Diagnosis Date     Colon polyp      Family history of colon cancer     Mom     GERD (gastroesophageal reflux disease) 2012     Major depressive disorder, single episode, moderate (H) 5/09     OA (osteoarthritis) 2004    Lt hip moderate     Osteopenia 7/09     Rosacea 1998     Ulcerative colitis, unspecified 1985    UC - rare issues     Unspecified hemorrhoids without mention of complication 4/07     Past Surgical History:   Procedure Laterality Date     COLONOSCOPY  8/16/2012     HC COLONOSCOPY THRU STOMA, DIAGNOSTIC  4/07, 2012, 12/16    colon polyp, diverticulosis, hemorrhoids - due ? Yrs     HC REVISE MEDIAN N/CARPAL TUNNEL SURG  12/02    bilateral - dr acuna     SURGICAL HISTORY OF -   6/07    Dr Perales - Lt hip CARMELINA     SURGICAL HISTORY OF -   6/09    Dr Perales - Rt Hip CARMELINA     Current Outpatient Prescriptions   Medication     FLUoxetine (PROZAC) 20 MG capsule     metroNIDAZOLE (METROGEL) 0.75 % topical gel     nitroFURantoin (MACRODANTIN) 50 MG capsule     Vaginal Lubricant (REPLENS) GEL     omeprazole (PRILOSEC) 20 MG CR capsule     No current facility-administered medications for this visit.      ROS: 10-pt ROS was negative  "other than that noted in the HPI.  /68  Ht 1.6 m (5' 3\")  Wt 84.4 kg (186 lb)  LMP 10/20/2003  BMI 32.95 kg/m2  GEN: NAD, pleasant   HEENT: EOMI  NECK: Supple  ABD: Obese, soft  EXT: No LE edema    CT Urogram 11/22/2017  IMPRESSION:   1. No cause for hematuria is identified. No urinary calculi or urinary  tract masses are seen.  2. 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  3. The appendix is mildly thickened, measuring 0.9 cm in diameter,  with only minimal associated periappendiceal inflammatory stranding.  An early or mild appendicitis cannot be excluded from this appearance.  Clinical correlation and follow-up are recommended.  4. Colonic diverticulosis, without evidence for diverticulitis.    URINE neg today    Assessment/Plan:  Kristin Jimenez is a 62 year old female with a history of gross hematuria, and rUTIs  Hematuria, resolved  Will refill nitrofurantoin for recurrent UTI prophylaxis. Will discuss break after summer trips.  RTO in Aug.      10 min spent with the patient in a face-to-face manner, >50% of the time spent on counseling and coordination of care of rUITs.    Again, thank you for allowing me to participate in the care of your patient.      Sincerely,    Rosanne Savage PA-C, FRANCISCA      "

## 2018-03-19 NOTE — PROGRESS NOTES
"CC: 3 month f/u gross hematuria and rUTIs    Kristin Jimenez is a pleasant 62 year old female with a history of gross hematuria. Found to have small bladder lesion on Nov 2017 cysto. Biopsy in Dec with Dr. Jackson was a hemangioma. Has been on maintenance dosing of nitrofurantoin. Has done great in the interim from this perspective and no rUTIs.    Has two big trips planned in the coming months and hoping to stay on maintenance.     Past Medical History:   Diagnosis Date     Colon polyp      Family history of colon cancer     Mom     GERD (gastroesophageal reflux disease) 2012     Major depressive disorder, single episode, moderate (H) 5/09     OA (osteoarthritis) 2004    Lt hip moderate     Osteopenia 7/09     Rosacea 1998     Ulcerative colitis, unspecified 1985    UC - rare issues     Unspecified hemorrhoids without mention of complication 4/07     Past Surgical History:   Procedure Laterality Date     COLONOSCOPY  8/16/2012     HC COLONOSCOPY THRU STOMA, DIAGNOSTIC  4/07, 2012, 12/16    colon polyp, diverticulosis, hemorrhoids - due ? Yrs     HC REVISE MEDIAN N/CARPAL TUNNEL SURG  12/02    bilateral - dr acuna     SURGICAL HISTORY OF -   6/07    Dr Perales - Lt hip CARMELINA     SURGICAL HISTORY OF -   6/09    Dr Perales - Rt Hip CARMELINA     Current Outpatient Prescriptions   Medication     FLUoxetine (PROZAC) 20 MG capsule     metroNIDAZOLE (METROGEL) 0.75 % topical gel     nitroFURantoin (MACRODANTIN) 50 MG capsule     Vaginal Lubricant (REPLENS) GEL     omeprazole (PRILOSEC) 20 MG CR capsule     No current facility-administered medications for this visit.      ROS: 10-pt ROS was negative other than that noted in the HPI.  /68  Ht 1.6 m (5' 3\")  Wt 84.4 kg (186 lb)  LMP 10/20/2003  BMI 32.95 kg/m2  GEN: NAD, pleasant   HEENT: EOMI  NECK: Supple  ABD: Obese, soft  EXT: No LE edema    CT Urogram 11/22/2017  IMPRESSION:   1. No cause for hematuria is identified. No urinary calculi or " urinary  tract masses are seen.  2. 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  3. The appendix is mildly thickened, measuring 0.9 cm in diameter,  with only minimal associated periappendiceal inflammatory stranding.  An early or mild appendicitis cannot be excluded from this appearance.  Clinical correlation and follow-up are recommended.  4. Colonic diverticulosis, without evidence for diverticulitis.    URINE neg today    Assessment/Plan:  Kristin Jimenez is a 62 year old female with a history of gross hematuria, and rUTIs  Hematuria, resolved  Will refill nitrofurantoin for recurrent UTI prophylaxis. Will discuss break after summer trips.  RTO in Aug.    FRANCISCA Reyes [facility]    10 min spent with the patient in a face-to-face manner, >50% of the time spent on counseling and coordination of care of rUITs.

## 2018-03-19 NOTE — NURSING NOTE
UA RESULTS:  Recent Labs   Lab Test  03/19/18   1022   10/24/17   1509   COLOR  Yellow   < >   --    APPEARANCE  Clear   < >   --    URINEGLC  Negative   < >   --    URINEBILI  Negative   < >   --    URINEKETONE  Negative   < >   --    SG  <=1.005   < >   --    UBLD  Negative   < >   --    URINEPH  6.0   < >   --    PROTEIN  Negative   < >   --    UROBILINOGEN  0.2   < >   --    NITRITE  Negative   < >   --    LEUKEST  Negative   < >   --    RBCU   --    --   <1   WBCU   --    --   0    < > = values in this interval not displayed.       Refill nitrofurantion today for recurrent UTI's.    Patient will return after summer trip.    Please follow up with Rosanne Savage PA-C around august.    It was a pleasure meeting with you today.  Thank you for allowing me and my team the privilege of caring for you today.  YOU are the reason we are here, and I truly hope we provided you with the excellent service you deserve.  Please let us know if there is anything else we can do for you so that we can be sure you are leaving completely satisfied with your care experience.

## 2018-03-21 ENCOUNTER — TELEPHONE (OUTPATIENT)
Dept: UROLOGY | Facility: CLINIC | Age: 63
End: 2018-03-21

## 2018-03-21 NOTE — TELEPHONE ENCOUNTER
Received a fax from patient's pharmacy. They were requesting script for patient's Macrobid be switched to 50mg instead of 100mg. This is the same Mg as patient has been taking. Called pharmacy and switched Rx strength.     Patricia Vick LPN

## 2018-06-15 DIAGNOSIS — K21.9 GASTROESOPHAGEAL REFLUX DISEASE WITHOUT ESOPHAGITIS: ICD-10-CM

## 2018-06-15 NOTE — TELEPHONE ENCOUNTER
"Requested Prescriptions   Pending Prescriptions Disp Refills     omeprazole (PRILOSEC) 20 MG CR capsule [Pharmacy Med Name: OMEPRAZOLE 20MG CPDR] 90 capsule 3    Last Written Prescription Date:  6.12.17  Last Fill Quantity: 90 capsule,  # refills: 3   Last Office Visit: 12/26/2017   Future Office Visit:      Sig: TAKE ONE CAPSULE BY MOUTH ONCE DAILY    PPI Protocol Passed    6/15/2018 11:52 AM       Passed - Not on Clopidogrel (unless Pantoprazole ordered)       Passed - No diagnosis of osteoporosis on record       Passed - Recent (12 mo) or future (30 days) visit within the authorizing provider's specialty    Patient had office visit in the last 12 months or has a visit in the next 30 days with authorizing provider or within the authorizing provider's specialty.  See \"Patient Info\" tab in inbasket, or \"Choose Columns\" in Meds & Orders section of the refill encounter.           Passed - Patient is age 18 or older       Passed - No active pregnacy on record       Passed - No positive pregnancy test in past 12 months          "

## 2018-06-18 ENCOUNTER — OFFICE VISIT (OUTPATIENT)
Dept: FAMILY MEDICINE | Facility: CLINIC | Age: 63
End: 2018-06-18
Payer: COMMERCIAL

## 2018-06-18 VITALS
HEIGHT: 63 IN | DIASTOLIC BLOOD PRESSURE: 68 MMHG | BODY MASS INDEX: 34.02 KG/M2 | HEART RATE: 82 BPM | TEMPERATURE: 98.4 F | WEIGHT: 192 LBS | OXYGEN SATURATION: 99 % | SYSTOLIC BLOOD PRESSURE: 116 MMHG

## 2018-06-18 DIAGNOSIS — K21.9 GASTROESOPHAGEAL REFLUX DISEASE WITHOUT ESOPHAGITIS: ICD-10-CM

## 2018-06-18 DIAGNOSIS — M85.89 OSTEOPENIA OF MULTIPLE SITES: ICD-10-CM

## 2018-06-18 DIAGNOSIS — Z87.440 PERSONAL HISTORY OF URINARY TRACT INFECTION: ICD-10-CM

## 2018-06-18 DIAGNOSIS — N30.90 RECURRENT CYSTITIS: ICD-10-CM

## 2018-06-18 DIAGNOSIS — R30.0 DYSURIA: Primary | ICD-10-CM

## 2018-06-18 DIAGNOSIS — F32.1 MAJOR DEPRESSIVE DISORDER, SINGLE EPISODE, MODERATE (H): ICD-10-CM

## 2018-06-18 DIAGNOSIS — E66.811 CLASS 1 OBESITY WITH SERIOUS COMORBIDITY AND BODY MASS INDEX (BMI) OF 34.0 TO 34.9 IN ADULT, UNSPECIFIED OBESITY TYPE: ICD-10-CM

## 2018-06-18 DIAGNOSIS — T75.3XXA MOTION SICKNESS, INITIAL ENCOUNTER: ICD-10-CM

## 2018-06-18 DIAGNOSIS — K51.919 ULCERATIVE COLITIS WITH COMPLICATION, UNSPECIFIED LOCATION (H): ICD-10-CM

## 2018-06-18 DIAGNOSIS — Z51.81 MEDICATION MONITORING ENCOUNTER: ICD-10-CM

## 2018-06-18 DIAGNOSIS — M15.0 PRIMARY OSTEOARTHRITIS INVOLVING MULTIPLE JOINTS: ICD-10-CM

## 2018-06-18 LAB
BACTERIA #/AREA URNS HPF: ABNORMAL /HPF
MUCOUS THREADS #/AREA URNS LPF: PRESENT /LPF
NON-SQ EPI CELLS #/AREA URNS LPF: ABNORMAL /LPF
RBC #/AREA URNS AUTO: ABNORMAL /HPF
WBC #/AREA URNS AUTO: ABNORMAL /HPF

## 2018-06-18 PROCEDURE — 81015 MICROSCOPIC EXAM OF URINE: CPT | Performed by: FAMILY MEDICINE

## 2018-06-18 PROCEDURE — 99213 OFFICE O/P EST LOW 20 MIN: CPT | Performed by: FAMILY MEDICINE

## 2018-06-18 PROCEDURE — 87086 URINE CULTURE/COLONY COUNT: CPT | Performed by: FAMILY MEDICINE

## 2018-06-18 RX ORDER — NITROFURANTOIN MACROCRYSTALS 50 MG/1
50 CAPSULE ORAL DAILY
Qty: 90 CAPSULE | Refills: 1 | Status: SHIPPED | OUTPATIENT
Start: 2018-06-18 | End: 2018-09-18

## 2018-06-18 RX ORDER — SCOLOPAMINE TRANSDERMAL SYSTEM 1 MG/1
PATCH, EXTENDED RELEASE TRANSDERMAL
Qty: 4 PATCH | Refills: 0 | Status: SHIPPED | OUTPATIENT
Start: 2018-06-18 | End: 2018-09-18

## 2018-06-18 RX ORDER — CEFDINIR 300 MG/1
300 CAPSULE ORAL 2 TIMES DAILY
Qty: 20 CAPSULE | Refills: 0 | Status: SHIPPED | OUTPATIENT
Start: 2018-06-18 | End: 2018-09-18

## 2018-06-18 ASSESSMENT — ANXIETY QUESTIONNAIRES
5. BEING SO RESTLESS THAT IT IS HARD TO SIT STILL: NOT AT ALL
IF YOU CHECKED OFF ANY PROBLEMS ON THIS QUESTIONNAIRE, HOW DIFFICULT HAVE THESE PROBLEMS MADE IT FOR YOU TO DO YOUR WORK, TAKE CARE OF THINGS AT HOME, OR GET ALONG WITH OTHER PEOPLE: NOT DIFFICULT AT ALL
7. FEELING AFRAID AS IF SOMETHING AWFUL MIGHT HAPPEN: NOT AT ALL
3. WORRYING TOO MUCH ABOUT DIFFERENT THINGS: NOT AT ALL
1. FEELING NERVOUS, ANXIOUS, OR ON EDGE: SEVERAL DAYS
2. NOT BEING ABLE TO STOP OR CONTROL WORRYING: NOT AT ALL
6. BECOMING EASILY ANNOYED OR IRRITABLE: NOT AT ALL
GAD7 TOTAL SCORE: 1

## 2018-06-18 ASSESSMENT — PATIENT HEALTH QUESTIONNAIRE - PHQ9: 5. POOR APPETITE OR OVEREATING: NOT AT ALL

## 2018-06-18 NOTE — LETTER
My Depression Action Plan  Name: Kristin Jimenez   Date of Birth 1955  Date: 6/18/2018    My doctor: Louis Nixon   My clinic: [address] [id number] [phone]          GREEN    ZONE   Good Control    What it looks like:     Things are going generally well. You have normal up s and down s. You may even feel depressed from time to time, but bad moods usually last less than a day.   What you need to do:  1. Continue to care for yourself (see self care plan)  2. Check your depression survival kit and update it as needed  3. Follow your physician s recommendations including any medication.  4. Do not stop taking medication unless you consult with your physician first.           YELLOW         ZONE Getting Worse    What it looks like:     Depression is starting to interfere with your life.     It may be hard to get out of bed; you may be starting to isolate yourself from others.    Symptoms of depression are starting to last most all day and this has happened for several days.     You may have suicidal thoughts but they are not constant.   What you need to do:     1. Call your care team, your response to treatment will improve if you keep your care team informed of your progress. Yellow periods are signs an adjustment may need to be made.     2. Continue your self-care, even if you have to fake it!    3. Talk to someone in your support network    4. Open up your depression survival kit           RED    ZONE Medical Alert - Get Help    What it looks like:     Depression is seriously interfering with your life.     You may experience these or other symptoms: You can t get out of bed most days, can t work or engage in other necessary activities, you have trouble taking care of basic hygiene, or basic responsibilities, thoughts of suicide or death that will not go away, self-injurious behavior.     What you need to do:  1. Call your care  team and request a same-day appointment. If they are not available (weekends or after hours) call your local crisis line, emergency room or 911.            Depression Self Care Plan / Survival Kit    Self-Care for Depression  Here s the deal. Your body and mind are really not as separate as most people think.  What you do and think affects how you feel and how you feel influences what you do and think. This means if you do things that people who feel good do, it will help you feel better.  Sometimes this is all it takes.  There is also a place for medication and therapy depending on how severe your depression is, so be sure to consult with your medical provider and/ or Behavioral Health Consultant if your symptoms are worsening or not improving.     In order to better manage my stress, I will:    Exercise  Get some form of exercise, every day. This will help reduce pain and release endorphins, the  feel good  chemicals in your brain. This is almost as good as taking antidepressants!  This is not the same as joining a gym and then never going! (they count on that by the way ) It can be as simple as just going for a walk or doing some gardening, anything that will get you moving.      Hygiene   Maintain good hygiene (Get out of bed in the morning, Make your bed, Brush your teeth, Take a shower, and Get dressed like you were going to work, even if you are unemployed).  If your clothes don't fit try to get ones that do.    Diet  I will strive to eat foods that are good for me, drink plenty of water, and avoid excessive sugar, caffeine, alcohol, and other mood-altering substances.  Some foods that are helpful in depression are: complex carbohydrates, B vitamins, flaxseed, fish or fish oil, fresh fruits and vegetables.    Psychotherapy  I agree to participate in Individual Therapy (if recommended).    Medication  If prescribed medications, I agree to take them.  Missing doses can result in serious side effects.  I  understand that drinking alcohol, or other illicit drug use, may cause potential side effects.  I will not stop my medication abruptly without first discussing it with my provider.    Staying Connected With Others  I will stay in touch with my friends, family members, and my primary care provider/team.    Use your imagination  Be creative.  We all have a creative side; it doesn t matter if it s oil painting, sand castles, or mud pies! This will also kick up the endorphins.    Witness Beauty  (AKA stop and smell the roses) Take a look outside, even in mid-winter. Notice colors, textures. Watch the squirrels and birds.     Service to others  Be of service to others.  There is always someone else in need.  By helping others we can  get out of ourselves  and remember the really important things.  This also provides opportunities for practicing all the other parts of the program.    Humor  Laugh and be silly!  Adjust your TV habits for less news and crime-drama and more comedy.    Control your stress  Try breathing deep, massage therapy, biofeedback, and meditation. Find time to relax each day.     My support system    Clinic Contact:  Phone number:    Contact 1:  Phone number:    Contact 2:  Phone number:    Tenriism/:  Phone number:    Therapist:  Phone number:    Local crisis center:    Phone number:    Other community support:  Phone number:

## 2018-06-18 NOTE — PROGRESS NOTES
"  SUBJECTIVE:   Kristin Jimenez is a 62 year old female who presents to clinic today for the following health issues:    URINARY TRACT SYMPTOMS  Onset: 3 days    Description:   Painful urination (Dysuria): YES  Blood in urine (Hematuria): YES  Delay in urine (Hesitency): no     Intensity: severe    Progression of Symptoms:  Better with Azo    Accompanying Signs & Symptoms:  Fever/chills: YES- sweats last week  Flank pain no   Nausea and vomiting: no   Any vaginal symptoms: none  Abdominal/Pelvic Pain: no     History:   History of frequent UTI's: YES  History of kidney stones: no   Sexually Active: YES  Possibility of pregnancy: No    Precipitating factors:  Held urine longer than normal at a wedding    Therapies Tried and outcome: AZO    Despite sx being present for the las 3 days, she was feeling warm with some chills \"all of last week\".     Currently, she complained of some hematuria prior to OTC AZO - she has also had urinary urgency and pain.     She has had a hx significant for frequent UTI - has been taking prophylactic Macrobid 100 mg daily per Urology, would like to continue.     Travel - upcoming [facility] groupuise    Anxiety/Depression -- Prozac 60 mg daily.     PHQ-9: 2  KRISTI-7: 1    GERD/UC -- Omeprazole 20 mg daily - needs it to control sx - no current issues.     No other concerns    Problem list and histories reviewed & adjusted, as indicated.  Additional history: as documented    Reviewed and updated as needed this visit by clinical staff  Tobacco  Allergies  Meds  Med Hx  Surg Hx  Fam Hx  Soc Hx      Reviewed and updated as needed this visit by Provider       BP Readings from Last 3 Encounters:   06/18/18 116/68   03/19/18 120/68   12/26/17 134/72     Wt Readings from Last 4 Encounters:   06/18/18 192 lb (87.1 kg)   03/19/18 186 lb (84.4 kg)   12/26/17 186 lb 4 oz (84.5 kg)   12/18/17 190 lb (86.2 kg)       Health Maintenance    Health Maintenance Due   Topic Date Due     FIT Q1 YR  07/07/1966 "     HIV SCREEN (SYSTEM ASSIGNED)  07/07/1974     WELLNESS VISIT Q1 YR  02/10/2016     PAP Q3 YR  12/30/2016     LIPID MONITORING Q1 YEAR  06/04/2017     PHQ-9 Q6 MONTHS  06/26/2018       Current Problem List    Patient Active Problem List   Diagnosis     Rosacea     Ulcerative colitis (H)     Major depressive disorder, single episode, moderate (H)     Osteopenia     Hyperlipidemia LDL goal <130     Health Care Home     Advanced directives, counseling/discussion     OA (osteoarthritis)     Family history of colon cancer     GERD (gastroesophageal reflux disease)     Colon polyp     Ulcerative rectosigmoiditis without complication (H)       Past Medical History    Past Medical History:   Diagnosis Date     Colon polyp      Family history of colon cancer     Mom     GERD (gastroesophageal reflux disease) 2012     Major depressive disorder, single episode, moderate (H) 5/09     OA (osteoarthritis) 2004    Lt hip moderate     Osteopenia 7/09     Rosacea 1998     Ulcerative colitis, unspecified 1985    UC - rare issues     Unspecified hemorrhoids without mention of complication 4/07       Past Surgical History    Past Surgical History:   Procedure Laterality Date     COLONOSCOPY  8/16/2012     HC COLONOSCOPY THRU STOMA, DIAGNOSTIC  4/07, 2012, 12/16    colon polyp, diverticulosis, hemorrhoids - due ? Yrs     HC REVISE MEDIAN N/CARPAL TUNNEL SURG  12/02    bilateral - dr acuna     SURGICAL HISTORY OF -   6/07    Dr Perales - Lt hip CARMELINA     SURGICAL HISTORY OF -   6/09    Dr Perales - Rt Hip CARMELINA       Current Medications    Current Outpatient Prescriptions   Medication Sig Dispense Refill     cefdinir (OMNICEF) 300 MG capsule Take 1 capsule (300 mg) by mouth 2 times daily 20 capsule 0     FLUoxetine (PROZAC) 20 MG capsule Take 3 capsules (60 mg) by mouth daily 270 capsule 1     metroNIDAZOLE (METROGEL) 0.75 % topical gel Apply topically 2 times daily 135 g 3     nitroFURantoin (MACRODANTIN) 50 MG capsule Take 1  capsule (50 mg) by mouth daily For 3 months for UTI prevention 90 capsule 1     nitroFURantoin, macrocrystal-monohydrate, (MACROBID) 100 MG capsule Take 1 capsule (100 mg) by mouth daily 90 capsule 1     omeprazole (PRILOSEC) 20 MG CR capsule TAKE ONE CAPSULE BY MOUTH ONCE DAILY 90 capsule 1     scopolamine (TRANSDERM) 72 hr patch Apply 1 patch to hairless area behind one ear at least 4 hours before travel.  Remove old patch and change every 3 days (72 hours). 4 patch 0     Vaginal Lubricant (REPLENS) GEL Use per package instructions         Allergies    Allergies   Allergen Reactions     Sulfa Drugs      Rash       Immunizations    Immunization History   Administered Date(s) Administered     Pneumococcal 23 valent 10/26/2016     TD (ADULT, 7+) 2002     TDAP Vaccine (Boostrix) 10/24/2011     Zoster vaccine recombinant adjuvanted (SHINGRIX) 2018     Zoster vaccine, live 10/26/2016, 2018       Family History    Family History   Problem Relation Age of Onset     Cancer Father       Lung CA - SMOKER     Lipids Sister      Lipids Brother      Cerebrovascular Disease Maternal Grandmother      C.A.D. Maternal Grandfather      Prostate Cancer Maternal Grandfather      Alzheimer Disease Paternal Grandmother      Prostate Cancer Paternal Grandfather      Eye Disorder Mother      glaucoma     Cancer - colorectal Mother      AGE 71 WITH METS liver cancer.  Oct. 2006     Eye Disorder Sister      histoplasmosis     Eye Disorder Brother      histoplasmosis       Social History    Social History     Social History     Marital status:      Spouse name: Compa     Number of children: 5     Years of education: 12     Occupational History      None      Social History Main Topics     Smoking status: Never Smoker     Smokeless tobacco: Never Used     Alcohol use 1.0 oz/week     2 Standard drinks or equivalent per week      Comment: 2 glass of wine per week     Drug use: No     Sexual activity: Yes     " Partners: Male     Other Topics Concern     Caffeine Concern Yes     2 cups daily     Exercise Yes     3+/week     Seat Belt Yes     Parent/Sibling W/ Cabg, Mi Or Angioplasty Before 65f 55m? No     Social History Narrative       All above reviewed and updated, all stable unless otherwise noted    Recent labs reviewed    ROS:  Constitutional, HEENT, cardiovascular, pulmonary, GI, , musculoskeletal, neuro, skin, endocrine and psych systems are negative, except as in HPI or otherwise noted     This document serves as a record of the services and decisions personally performed and made by Louis Nixon MD FAAFP. It was created on their behalf by Cesar Willis, a trained medical scribe. The creation of this document is based the provider's statements to the medical scribe.  Cesar Willis June 18, 2018 12:05 PM      OBJECTIVE:                                                    /68  Pulse 82  Temp 98.4  F (36.9  C) (Oral)  Ht 5' 3\" (1.6 m)  Wt 192 lb (87.1 kg)  LMP 10/20/2003  SpO2 99%  BMI 34.01 kg/m2  Body mass index is 34.01 kg/(m^2).  GENERAL: healthy, alert and no distress, obese   HENT: ear canals and TM's normal upon viewing with otoscope, nose and mouth without ulcers or lesions upon viewing with otoscope  RESP: lungs clear to auscultation - no rales, no rhonchi, no wheezes  CV: regular rates and rhythm, normal S1 S2, no S3 or S4, grade 1/6 systolic murmur, no click or rub -  ABDOMEN: soft, mild suprapubic tenderness, no hepatosplenomegaly, no masses, normal bowel sounds  MS: extremities- no gross deformities noted, no edema  SKIN: no suspicious lesions, no rashes to visible skin  NEURO: mentation intact and speech normal  BACK: no CVA tenderness, no paralumbar tenderness  PSYCH: affect normal    DIAGNOSTICS/PROCEDURES:                                                      Results for orders placed or performed in visit on 06/18/18 (from the past 24 hour(s))   Urine Microscopic   Result Value Ref Range    " WBC Urine 5-10 (A) OTO5^0 - 5 /HPF    RBC Urine 10-25 (A) OTO2^O - 2 /HPF    Squamous Epithelial /LPF Urine Few FEW^Few /LPF    Bacteria Urine Few (A) NEG^Negative /HPF    Mucous Urine Present (A) NEG^Negative /LPF        ASSESSMENT/PLAN:                                                        ICD-10-CM    1. Dysuria R30.0 Urine Microscopic     Urine Culture Aerobic Bacterial     cefdinir (OMNICEF) 300 MG capsule     CANCELED: UA reflex to Microscopic and Culture   2. Personal history of urinary tract infection Z87.440 nitroFURantoin (MACRODANTIN) 50 MG capsule   3. Recurrent cystitis N30.90 nitroFURantoin (MACRODANTIN) 50 MG capsule   4. Major depressive disorder, single episode, moderate (H) F32.1    5. Motion sickness, initial encounter T75.3XXA scopolamine (TRANSDERM) 72 hr patch   6. Primary osteoarthritis involving multiple joints M15.0    7. Osteopenia of multiple sites M85.89    8. Gastroesophageal reflux disease without esophagitis K21.9    9. Ulcerative colitis with complication, unspecified location (H) K51.919    10. Class 1 obesity with serious comorbidity and body mass index (BMI) of 34.0 to 34.9 in adult, unspecified obesity type E66.9     Z68.34    11. Medication monitoring encounter Z51.81      Discussed treatment/modality options, including risk and benefits, she desires follow up for fasting complete physical, further health care maintenance, further lab(s), medication refill(s), new medications (Omnicef), OTC meds, and observation. All diagnosis above reviewed and noted above, otherwise stable.  See [facility] orders for further details.      Return if symptoms worsen or fail to improve, for Physical Exam, Lab Work.    Health Maintenance Due   Topic Date Due     FIT Q1 YR  07/07/1966     HIV SCREEN (SYSTEM ASSIGNED)  07/07/1974     WELLNESS VISIT Q1 YR  02/10/2016     PAP Q3 YR  12/30/2016     LIPID MONITORING Q1 YEAR  06/04/2017     PHQ-9 Q6 MONTHS  06/26/2018     Patient Instructions     Omnicef,  as directed twice daily for 10 days    Okay to start Macrobid daily (prevention) after your course of omnicef is complete     Due for cpx fasting with HCM      The information in this document, created by the medical scribe for me, accurately reflects the services I personally performed and the decisions made by me. I have reviewed and approved this document for accuracy.   Louis Nixon MD FAAFP            Louis Nixon MD [address]  [address] [phone] Fax

## 2018-06-18 NOTE — MR AVS SNAPSHOT
After Visit Summary   6/18/2018    Kristin Jimenez    MRN: [id number]           Patient Information     Date Of Birth          1955        Visit Information        Provider Department      6/18/2018 11:40 AM Louis Nixon MD [facility]        Today's Diagnoses     Dysuria    -  1    Personal history of urinary tract infection        Recurrent cystitis        Major depressive disorder, single episode, moderate (H)        Motion sickness, initial encounter        Osteopenia of multiple sites        Primary osteoarthritis involving multiple joints        Gastroesophageal reflux disease without esophagitis        Ulcerative colitis with complication, unspecified location (H)        Class 1 obesity with serious comorbidity and body mass index (BMI) of 34.0 to 34.9 in adult, unspecified obesity type        Medication monitoring encounter          Care Instructions    Omnicef, as directed twice daily for 10 days    Okay to start Macrobid after your course of omnicef is complete       [facility]                        To reach your care team during and after hours:   [phone]  To reach our pharmacy:        [phone]    Clinic Hours                        Our clinic hours are:    Monday   7:30 am to 7:00 pm                  Tuesday through Friday 7:30 am to 5:00 pm                             Saturday   8:00 am to 12:00 pm      Sunday   Closed      Pharmacy Hours                        Our pharmacy hours are:    Monday   8:30 am to 7:00 pm       Tuesday to Friday  8:30 am to 6:00 pm                       Saturday    9:00 am to 1:00 pm              Sunday    Closed              There is also information available at our web site:  [email or web address]    If your provider ordered any lab tests and you do not receive the results within 10 business days, please call the clinic.    If you need a medication refill please contact your pharmacy.  Please allow 2-3 business  days for your refill to be completed.    Our clinic offers telephone visits and e visits.  Please ask one of your team members to explain more.      Use Liquid Account[employer]art (secure email communication and access to your chart) to send your primary care provider a message or make an appointment. Ask someone on your Team how to sign up for Liquid Account[employer]art.  Immunizations                      Immunization History   Administered Date(s) Administered     Pneumococcal 23 valent 10/26/2016     TD (ADULT, 7+) 12/17/2002     TDAP Vaccine (Boostrix) 10/24/2011     Zoster vaccine recombinant adjuvanted (SHINGRIX) 03/30/2018     Zoster vaccine, live 10/26/2016, 03/30/2018        Health Maintenance                         Health Maintenance Due   Topic Date Due     Colon Cancer Screening - FIT Test - yearly  07/07/1966     HIV SCREEN (SYSTEM ASSIGNED)  07/07/1974     Wellness Visit with your Primary Provider - yearly  02/10/2016     Pap Smear - every 3 years  12/30/2016     Cholesterol Lab - yearly  06/04/2017     Depression Action Plan Review - yearly  10/26/2017     Depression Assessment - every 6 months  06/26/2018               Follow-ups after your visit        Follow-up notes from your care team     Return if symptoms worsen or fail to improve, for Physical Exam, Lab Work.      Your next 10 appointments already scheduled     Aug 21, 2018 10:00 AM CDT   Return Visit with Rosnane Savage PA-C   [facility] ([facility])    [address]  [address]  [facility] [address] [phone]              Who to contact     If you have questions or need follow up information about today's clinic visit or your schedule please contact [facility] directly at [phone].  Normal or non-critical lab and imaging results will be communicated to you by MyChart, letter or phone within 4 business days after the clinic has received the results. If you do not hear from us within 7 days,  "please contact the clinic through [employer] or phone. If you have a critical or abnormal lab result, we will notify you by phone as soon as possible.  Submit refill requests through [employer] or call your pharmacy and they will forward the refill request to us. Please allow 3 business days for your refill to be completed.          Additional Information About Your Visit        [employer] Information     [employer] gives you secure access to your electronic health record. If you see a primary care provider, you can also send messages to your care team and make appointments. If you have questions, please call your primary care clinic.  If you do not have a primary care provider, please call [phone] and they will assist you.        Care EveryWhere ID     This is your Care EveryWhere ID. This could be used by other organizations to access your [facility] medical records  [id number]        Your Vitals Were     Pulse Temperature Height Last Period Pulse Oximetry BMI (Body Mass Index)    82 98.4  F (36.9  C) (Oral) 5' 3\" (1.6 m) 10/20/2003 99% 34.01 kg/m2       Blood Pressure from Last 3 Encounters:   06/18/18 116/68   03/19/18 120/68   12/26/17 134/72    Weight from Last 3 Encounters:   06/18/18 192 lb (87.1 kg)   03/19/18 186 lb (84.4 kg)   12/26/17 186 lb 4 oz (84.5 kg)              We Performed the Following     DEPRESSION ACTION PLAN (DAP)     Urine Culture Aerobic Bacterial     Urine Microscopic          Today's Medication Changes          These changes are accurate as of 6/18/18 12:17 PM.  If you have any questions, ask your nurse or doctor.               Start taking these medicines.        Dose/Directions    cefdinir 300 MG capsule   Commonly known as:  OMNICEF   Used for:  Dysuria   Started by:  Louis Nixon MD        Dose:  300 mg   Take 1 capsule (300 mg) by mouth 2 times daily   Quantity:  20 capsule   Refills:  0       scopolamine 72 hr patch   Commonly known as:  TRANSDERM   Used for:  Motion sickness, initial " encounter   Started by:  Louis Nixon MD        Apply 1 patch to hairless area behind one ear at least 4 hours before travel.  Remove old patch and change every 3 days (72 hours).   Quantity:  4 patch   Refills:  0            Where to get your medicines      These medications were sent to [id number] - [place], [place] - [address]  [address], [place] [address]    Hours:  formerly Maritza Landaverde Phone:  [phone]     cefdinir 300 MG capsule    nitroFURantoin 50 MG capsule    scopolamine 72 hr patch                Primary Care Provider Office Phone # Fax #    Louis Nixon [phone]       [address] [facility] [address]        Equal Access to Services     [facility]: Hadii aad ku hadasho Soomaali, waaxda luqadaha, qaybta kaalmada adeegyada, waxay idiin hayaan golden shaw . [facility] [phone].    ATENCIÓN: Si habla español, tiene a meyers disposición servicios gratuitos de asistencia lingüística. [facility] [phone].    We comply with applicable federal civil rights laws and [place] laws. We do not discriminate on the basis of race, color, national origin, age, disability, sex, sexual orientation, or gender identity.            Thank you!     Thank you for choosing [facility]  for your care. Our goal is always to provide you with excellent care. Hearing back from our patients is one way we can continue to improve our services. Please take a few minutes to complete the written survey that you may receive in the mail after your visit with us. Thank you!             Your Updated Medication List - Protect others around you: Learn how to safely use, store and throw away your medicines at www.disposemymeds.org.          This list is accurate as of 6/18/18 12:17 PM.  Always use your most recent med list.                   Brand Name Dispense Instructions for use Diagnosis    cefdinir 300 MG capsule    OMNICEF    20 capsule    Take 1  capsule (300 mg) by mouth 2 times daily    Dysuria       FLUoxetine 20 MG capsule    PROzac    270 capsule    Take 3 capsules (60 mg) by mouth daily    Major depressive disorder, single episode, moderate (H)       metroNIDAZOLE 0.75 % topical gel    METROGEL    135 g    Apply topically 2 times daily    Rosacea       nitroFURantoin (macrocrystal-monohydrate) 100 MG capsule    MACROBID    90 capsule    Take 1 capsule (100 mg) by mouth daily    Personal history of urinary tract infection       nitroFURantoin 50 MG capsule    MACRODANTIN    90 capsule    Take 1 capsule (50 mg) by mouth daily For 3 months for UTI prevention    Recurrent cystitis, Personal history of urinary tract infection       omeprazole 20 MG CR capsule    priLOSEC    90 capsule    TAKE ONE CAPSULE BY MOUTH ONCE DAILY    Gastroesophageal reflux disease without esophagitis       replens Gel      Use per package instructions    Vaginal dryness, menopausal       scopolamine 72 hr patch    TRANSDERM    4 patch    Apply 1 patch to hairless area behind one ear at least 4 hours before travel.  Remove old patch and change every 3 days (72 hours).    Motion sickness, initial encounter

## 2018-06-18 NOTE — PATIENT INSTRUCTIONS
Omnicef, as directed twice daily for 10 days    Okay to start Macrobid after your course of omnicef is complete       [facility]                        To reach your care team during and after hours:   [phone]  To reach our pharmacy:        [phone]    Clinic Hours                        Our clinic hours are:    Monday   7:30 am to 7:00 pm                  Tuesday through Friday 7:30 am to 5:00 pm                             Saturday   8:00 am to 12:00 pm      Sunday   Closed      Pharmacy Hours                        Our pharmacy hours are:    Monday   8:30 am to 7:00 pm       Tuesday to Friday  8:30 am to 6:00 pm                       Saturday    9:00 am to 1:00 pm              Sunday    Closed              There is also information available at our web site:  [email or web address]    If your provider ordered any lab tests and you do not receive the results within 10 business days, please call the clinic.    If you need a medication refill please contact your pharmacy.  Please allow 2-3 business days for your refill to be completed.    Our clinic offers telephone visits and e visits.  Please ask one of your team members to explain more.      Use [employer] (secure email communication and access to your chart) to send your primary care provider a message or make an appointment. Ask someone on your Team how to sign up for Zones.  Immunizations                      Immunization History   Administered Date(s) Administered     Pneumococcal 23 valent 10/26/2016     TD (ADULT, 7+) 12/17/2002     TDAP Vaccine (Boostrix) 10/24/2011     Zoster vaccine recombinant adjuvanted (SHINGRIX) 03/30/2018     Zoster vaccine, live 10/26/2016, 03/30/2018        Health Maintenance                         Health Maintenance Due   Topic Date Due     Colon Cancer Screening - FIT Test - yearly  07/07/1966     HIV SCREEN (SYSTEM ASSIGNED)  07/07/1974     Wellness Visit with your Primary Provider - yearly  02/10/2016      Pap Smear - every 3 years  12/30/2016     Cholesterol Lab - yearly  06/04/2017     Depression Action Plan Review - yearly  10/26/2017     Depression Assessment - every 6 months  06/26/2018

## 2018-06-19 LAB
BACTERIA SPEC CULT: NO GROWTH
SPECIMEN SOURCE: NORMAL

## 2018-06-19 ASSESSMENT — ANXIETY QUESTIONNAIRES: GAD7 TOTAL SCORE: 1

## 2018-06-19 ASSESSMENT — PATIENT HEALTH QUESTIONNAIRE - PHQ9: SUM OF ALL RESPONSES TO PHQ QUESTIONS 1-9: 2

## 2018-08-16 DIAGNOSIS — Z87.440 PERSONAL HISTORY OF URINARY TRACT INFECTION: Primary | ICD-10-CM

## 2018-08-22 DIAGNOSIS — F32.1 MAJOR DEPRESSIVE DISORDER, SINGLE EPISODE, MODERATE (H): ICD-10-CM

## 2018-08-22 NOTE — TELEPHONE ENCOUNTER
"Requested Prescriptions   Pending Prescriptions Disp Refills     FLUoxetine (PROZAC) 20 MG capsule [Pharmacy Med Name: FLUOXETINE HCL 20MG CAPS] 270 capsule 1     Sig: TAKE THREE CAPSULES BY MOUTH EVERY DAY    SSRIs Protocol Passed    8/22/2018  7:14 AM       Passed - PHQ-9 score less than 5 in past 6 months    Please review last PHQ-9 score.          Passed - Patient is age 18 or older       Passed - No active pregnancy on record       Passed - No positive pregnancy test in last 12 months       Passed - Recent (6 mo) or future (30 days) visit within the authorizing provider's specialty    Patient had office visit in the last 6 months or has a visit in the next 30 days with authorizing provider or within the authorizing provider's specialty.  See \"Patient Info\" tab in inbasket, or \"Choose Columns\" in Meds & Orders section of the refill encounter.            Last Written Prescription Date:  12/26/2017  Last Fill Quantity: 270,  # refills: 1   Last office visit: 6/18/2018 with prescribing provider:  12/26/2017   Future Office Visit:   Next 5 appointments (look out 90 days)     Sep 18, 2018 10:00 AM CDT   PHYSICAL with Louis Nixon MD   [facility]    [address] [place] [address] [phone]                   "

## 2018-08-23 NOTE — TELEPHONE ENCOUNTER
PHQ-9 SCORE 12/26/2016 12/26/2017 6/18/2018   Total Score - - -   Total Score 0 0 2     KRISTI-7 SCORE 7/6/2017 12/26/2017 6/18/2018   Total Score - - -   Total Score 0 0 1     Refilled per RN Protocol.     Judy Sweeney, RN  [facility]

## 2018-09-18 ENCOUNTER — OFFICE VISIT (OUTPATIENT)
Dept: FAMILY MEDICINE | Facility: CLINIC | Age: 63
End: 2018-09-18
Payer: COMMERCIAL

## 2018-09-18 VITALS
WEIGHT: 193 LBS | SYSTOLIC BLOOD PRESSURE: 126 MMHG | HEART RATE: 69 BPM | OXYGEN SATURATION: 100 % | HEIGHT: 63 IN | DIASTOLIC BLOOD PRESSURE: 82 MMHG | TEMPERATURE: 98 F | BODY MASS INDEX: 34.2 KG/M2

## 2018-09-18 DIAGNOSIS — Z00.00 ENCOUNTER FOR ROUTINE ADULT HEALTH EXAMINATION WITHOUT ABNORMAL FINDINGS: Primary | ICD-10-CM

## 2018-09-18 DIAGNOSIS — L71.9 ROSACEA: ICD-10-CM

## 2018-09-18 DIAGNOSIS — E78.5 HYPERLIPIDEMIA LDL GOAL <130: ICD-10-CM

## 2018-09-18 DIAGNOSIS — Z51.81 MEDICATION MONITORING ENCOUNTER: ICD-10-CM

## 2018-09-18 DIAGNOSIS — M15.0 PRIMARY OSTEOARTHRITIS INVOLVING MULTIPLE JOINTS: ICD-10-CM

## 2018-09-18 DIAGNOSIS — N30.90 RECURRENT CYSTITIS: ICD-10-CM

## 2018-09-18 DIAGNOSIS — H40.9 GLAUCOMA OF BOTH EYES, UNSPECIFIED GLAUCOMA TYPE: ICD-10-CM

## 2018-09-18 DIAGNOSIS — Z87.440 PERSONAL HISTORY OF URINARY TRACT INFECTION: ICD-10-CM

## 2018-09-18 DIAGNOSIS — Z12.11 SCREEN FOR COLON CANCER: ICD-10-CM

## 2018-09-18 DIAGNOSIS — K51.919 ULCERATIVE COLITIS WITH COMPLICATION, UNSPECIFIED LOCATION (H): ICD-10-CM

## 2018-09-18 DIAGNOSIS — K21.9 GASTROESOPHAGEAL REFLUX DISEASE WITHOUT ESOPHAGITIS: ICD-10-CM

## 2018-09-18 DIAGNOSIS — Z80.0 FAMILY HISTORY OF COLON CANCER: ICD-10-CM

## 2018-09-18 DIAGNOSIS — F32.1 MAJOR DEPRESSIVE DISORDER, SINGLE EPISODE, MODERATE (H): ICD-10-CM

## 2018-09-18 DIAGNOSIS — Z12.39 SCREENING FOR BREAST CANCER: ICD-10-CM

## 2018-09-18 DIAGNOSIS — M85.89 OSTEOPENIA OF MULTIPLE SITES: ICD-10-CM

## 2018-09-18 DIAGNOSIS — K63.5 POLYP OF COLON, UNSPECIFIED PART OF COLON, UNSPECIFIED TYPE: ICD-10-CM

## 2018-09-18 LAB
ALBUMIN UR-MCNC: NEGATIVE MG/DL
APPEARANCE UR: CLEAR
BILIRUB UR QL STRIP: NEGATIVE
COLOR UR AUTO: YELLOW
CREAT UR-MCNC: 16 MG/DL
GLUCOSE UR STRIP-MCNC: NEGATIVE MG/DL
HGB UR QL STRIP: NEGATIVE
KETONES UR STRIP-MCNC: NEGATIVE MG/DL
LEUKOCYTE ESTERASE UR QL STRIP: NEGATIVE
MICROALBUMIN UR-MCNC: <5 MG/L
MICROALBUMIN/CREAT UR: NORMAL MG/G CR (ref 0–25)
NITRATE UR QL: NEGATIVE
PH UR STRIP: 7 PH (ref 5–7)
SOURCE: NORMAL
SP GR UR STRIP: <=1.005 (ref 1–1.03)
UROBILINOGEN UR STRIP-ACNC: 0.2 EU/DL (ref 0.2–1)

## 2018-09-18 PROCEDURE — 99396 PREV VISIT EST AGE 40-64: CPT | Performed by: FAMILY MEDICINE

## 2018-09-18 PROCEDURE — 82043 UR ALBUMIN QUANTITATIVE: CPT | Performed by: FAMILY MEDICINE

## 2018-09-18 PROCEDURE — 81003 URINALYSIS AUTO W/O SCOPE: CPT | Performed by: FAMILY MEDICINE

## 2018-09-18 RX ORDER — NITROFURANTOIN MACROCRYSTALS 50 MG/1
50 CAPSULE ORAL DAILY
Qty: 90 CAPSULE | Refills: 3 | Status: SHIPPED | OUTPATIENT
Start: 2018-09-18 | End: 2019-10-27

## 2018-09-18 RX ORDER — METRONIDAZOLE 7.5 MG/G
GEL TOPICAL 2 TIMES DAILY
Qty: 135 G | Refills: 3 | Status: SHIPPED | OUTPATIENT
Start: 2018-09-18 | End: 2020-07-29

## 2018-09-18 ASSESSMENT — ANXIETY QUESTIONNAIRES
2. NOT BEING ABLE TO STOP OR CONTROL WORRYING: NOT AT ALL
GAD7 TOTAL SCORE: 0
5. BEING SO RESTLESS THAT IT IS HARD TO SIT STILL: NOT AT ALL
7. FEELING AFRAID AS IF SOMETHING AWFUL MIGHT HAPPEN: NOT AT ALL
6. BECOMING EASILY ANNOYED OR IRRITABLE: NOT AT ALL
IF YOU CHECKED OFF ANY PROBLEMS ON THIS QUESTIONNAIRE, HOW DIFFICULT HAVE THESE PROBLEMS MADE IT FOR YOU TO DO YOUR WORK, TAKE CARE OF THINGS AT HOME, OR GET ALONG WITH OTHER PEOPLE: NOT DIFFICULT AT ALL
3. WORRYING TOO MUCH ABOUT DIFFERENT THINGS: NOT AT ALL
1. FEELING NERVOUS, ANXIOUS, OR ON EDGE: NOT AT ALL

## 2018-09-18 ASSESSMENT — PATIENT HEALTH QUESTIONNAIRE - PHQ9: 5. POOR APPETITE OR OVEREATING: NOT AT ALL

## 2018-09-18 NOTE — LETTER
[address], [place] [address] [phone]   September 19, 2018    Kristin Jimenez  [address] [facility] [address]      Dear Kristin,    Here is a summary of your recent test results:    Urine is normal.     We advise:     Continue current cares.     Your test results are enclosed.      Please contact me if you have any questions.             Thank you very much for trusting [facility]..     Healthy regards,        Louis Nixon M.D.        Results for orders placed or performed in visit on 09/18/18   Albumin Random Urine Quantitative with Creat Ratio   Result Value Ref [facility]    Creatinine Urine 16 mg/dL    Albumin Urine mg/L <5 mg/L    Albumin Urine mg/g Cr Unable to calculate due to low value 0 - 25 mg/g Cr   *UA reflex to Microscopic and Culture ([facility] (except [place] and [place])   Result Value Ref [facility]    Color Urine Yellow     Appearance Urine Clear     Glucose Urine Negative NEG^Negative mg/dL    Bilirubin Urine Negative NEG^Negative    Ketones Urine Negative NEG^Negative mg/dL    Specific Gravity Urine <=1.005 1.003 - 1.035    Blood Urine Negative NEG^Negative    pH Urine 7.0 5.0 - 7.0 pH    Protein Albumin Urine Negative NEG^Negative mg/dL    Urobilinogen Urine 0.2 0.2 - 1.0 EU/dL    Nitrite Urine Negative NEG^Negative    Leukocyte Esterase Urine Negative NEG^Negative    Source Midstream Urine

## 2018-09-18 NOTE — MR AVS SNAPSHOT
After Visit Summary   9/18/2018    Kristin Jimenez    MRN: [id number]           Patient Information     Date Of Birth          1955        Visit Information        Provider Department      9/18/2018 10:00 AM Louis Nixon MD [facility]        Today's Diagnoses     Encounter for routine adult health examination without abnormal findings    -  1    Major depressive disorder, single episode, moderate        Glaucoma of both eyes, unspecified glaucoma type        Rosacea        Recurrent cystitis        Personal history of urinary tract infection        Hyperlipidemia LDL goal <130        Primary osteoarthritis involving multiple joints        Ulcerative colitis with complication, unspecified location (H)        Gastroesophageal reflux disease without esophagitis        Osteopenia of multiple sites        Polyp of colon, unspecified part of colon, unspecified type        Family history of colon cancer        Screen for colon cancer        Screening for breast cancer        Medication monitoring encounter          Care Instructions      Preventive Health Recommendations  Female Ages 50 - 64    Yearly exam: See your health care provider every year in order to  o Review health changes.   o Discuss preventive care.    o Review your medicines if your doctor has prescribed any.      Get a Pap test every three years (unless you have an abnormal result and your provider advises testing more often).    If you get Pap tests with HPV test, you only need to test every 5 years, unless you have an abnormal result.     You do not need a Pap test if your uterus was removed (hysterectomy) and you have not had cancer.    You should be tested each year for STDs (sexually transmitted diseases) if you're at risk.     Have a mammogram every 1 to 2 years.    Have a colonoscopy at age 50, or have a yearly FIT test (stool test). These exams screen for colon cancer.      Have a cholesterol test every 5  years, or more often if advised.    Have a diabetes test (fasting glucose) every three years. If you are at risk for diabetes, you should have this test more often.     If you are at risk for osteoporosis (brittle bone disease), think about having a bone density scan (DEXA).    Shots: Get a flu shot each year. Get a tetanus shot every 10 years.    Nutrition:     Eat at least 5 servings of fruits and vegetables each day.    Eat whole-grain bread, whole-wheat pasta and brown rice instead of white grains and rice.    Get adequate Calcium and Vitamin D.     Lifestyle    Exercise at least 150 minutes a week (30 minutes a day, 5 days a week). This will help you control your weight and prevent disease.    Limit alcohol to one drink per day.    No smoking.     Wear sunscreen to prevent skin cancer.     See your dentist every six months for an exam and cleaning.    See your eye doctor every 1 to 2 years.      [facility]                        To reach your care team during and after hours:   [phone]  To reach our pharmacy:        [phone]    Clinic Hours                        Our clinic hours are:    Monday   7:30 am to 7:00 pm                  Tuesday through Friday 7:30 am to 5:00 pm                             Saturday   8:00 am to 12:00 pm      Sunday   Closed      Pharmacy Hours                        Our pharmacy hours are:    Monday   8:30 am to 7:00 pm       Tuesday to Friday  8:30 am to 6:00 pm                       Saturday    9:00 am to 1:00 pm              Sunday    Closed              There is also information available at our web site:  [email or web address]    If your provider ordered any lab tests and you do not receive the results within 10 business days, please call the clinic.    If you need a medication refill please contact your pharmacy.  Please allow 2-3 business days for your refill to be completed.    Our clinic offers telephone visits and e visits.  Please ask one of your  team members to explain more.      Use MyChart (secure email communication and access to your chart) to send your primary care provider a message or make an appointment. Ask someone on your Team how to sign up for [employer].  Immunizations                      Immunization History   Administered Date(s) Administered     Pneumococcal 23 valent 10/26/2016     TD (ADULT, 7+) 12/17/2002     TDAP Vaccine (Boostrix) 10/24/2011     Zoster vaccine recombinant adjuvanted (SHINGRIX) 03/30/2018, 08/06/2018     Zoster vaccine, live 10/26/2016, 03/30/2018        Health Maintenance                         Health Maintenance Due   Topic Date Due     Colon Cancer Screening - FIT Test - yearly  07/07/1966     Wellness Visit with your Primary Provider - yearly  02/10/2016     Pap Smear - every 3 years  12/30/2016     Cholesterol Lab - yearly  06/04/2017     Flu Vaccine (1) 09/01/2018     Mammogram - yearly  08/08/2018               Follow-ups after your visit        Your next 10 appointments already scheduled     Oct 23, 2018 10:00 AM CDT   Return Visit with Rosanne Savage PA-C   [facility] ([facility])    [address]  [address]  [facility] [id number] [phone]              Future tests that were ordered for you today     Open Future Orders        Priority Expected Expires Ordered    Vitamin D Deficiency Routine  9/18/2019 9/18/2018    Comprehensive metabolic panel Routine 10/18/2018 9/18/2019 9/18/2018    Lipid panel reflex to direct LDL Fasting Routine 10/18/2018 9/18/2019 9/18/2018    CK total Routine 10/18/2018 9/18/2019 9/18/2018    CBC with platelets Routine 10/18/2018 9/18/2019 9/18/2018    TSH with free T4 reflex Routine 10/18/2018 9/18/2019 9/18/2018    Albumin Random Urine Quantitative with Creat Ratio Routine  9/18/2019 9/18/2018    *UA reflex to Microscopic and Culture ([facility] (except [place] and [place]) Routine  9/18/2019 9/18/2018  "   Fecal colorectal cancer screen (FIT) Routine 10/9/2018 9/18/2019 9/18/2018    MA SCREENING DIGITAL BILAT - Future  (s+30) Routine  9/18/2019 9/18/2018            Who to contact     If you have questions or need follow up information about today's clinic visit or your schedule please contact [facility] directly at [phone].  Normal or non-critical lab and imaging results will be communicated to you by [employer], letter or phone within 4 business days after the clinic has received the results. If you do not hear from us within 7 days, please contact the clinic through [employer] or phone. If you have a critical or abnormal lab result, we will notify you by phone as soon as possible.  Submit refill requests through [employer] or call your pharmacy and they will forward the refill request to us. Please allow 3 business days for your refill to be completed.          Additional Information About Your Visit        [employer] Information     [employer] gives you secure access to your electronic health record. If you see a primary care provider, you can also send messages to your care team and make appointments. If you have questions, please call your primary care clinic.  If you do not have a primary care provider, please call [phone] and they will assist you.        Care EveryWhere ID     This is your Care EveryWhere ID. This could be used by other organizations to access your [facility] medical records  [id number]        Your Vitals Were     Pulse Temperature Height Last Period Pulse Oximetry BMI (Body Mass Index)    69 98  F (36.7  C) (Oral) 5' 3\" (1.6 m) 10/20/2003 100% 34.19 kg/m2       Blood Pressure from Last 3 Encounters:   09/18/18 126/82   06/18/18 116/68   03/19/18 120/68    Weight from Last 3 Encounters:   09/18/18 193 lb (87.5 kg)   06/18/18 192 lb (87.1 kg)   03/19/18 186 lb (84.4 kg)                 Today's Medication Changes          These changes are accurate as of 9/18/18 11:12 AM.  If you " have any questions, ask your nurse or doctor.               These medicines have changed or have updated prescriptions.        Dose/Directions    FLUoxetine 20 MG capsule   Commonly known as:  PROzac   This may have changed:  See the new instructions.   Used for:  Major depressive disorder, single episode, moderate (H)   Changed by:  Louis Nixon MD        Dose:  60 mg   Take 3 capsules (60 mg) by mouth daily   Quantity:  270 capsule   Refills:  3       omeprazole 20 MG CR capsule   Commonly known as:  priLOSEC   This may have changed:  See the new instructions.   Used for:  Gastroesophageal reflux disease without esophagitis   Changed by:  Louis Nixon MD        Dose:  20 mg   Take 1 capsule (20 mg) by mouth daily   Quantity:  90 capsule   Refills:  3            Where to get your medicines      These medications were sent to [id number] - [place], [place] - [address]  [address], [facility] [address]    Hours:  formerly Maritza Bonillaer Phone:  [phone]     FLUoxetine 20 MG capsule    metroNIDAZOLE 0.75 % topical gel    nitroFURantoin 50 MG capsule    omeprazole 20 MG CR capsule                Primary Care Provider Office Phone # Fax #    Louis Nixon [phone] [address] [facility] [facility] [address]        Equal Access to Services     [facility]: Hadii yolanda ku hadasho Soomaali, waaxda luqadaha, qaybta kaalmada adeegyada, margaret choiin haygeen golden kim. So [facility] [phone].    ATENCIÓN: Si habla español, tiene a meyers disposición servicios gratuitos de asistencia lingüística. Llame al [phone].    We comply with applicable federal civil rights laws and [place] laws. We do not discriminate on the basis of race, color, national origin, age, disability, sex, sexual orientation, or gender identity.            Thank you!     Thank you for choosing [facility]  for your care. Our goal is always to provide you with excellent care. Hearing  back from our patients is one way we can continue to improve our services. Please take a few minutes to complete the written survey that you may receive in the mail after your visit with us. Thank you!             Your Updated Medication List - Protect others around you: Learn how to safely use, store and throw away your medicines at www.disposemymeds.org.          This list is accurate as of 9/18/18 11:12 AM.  Always use your most recent med list.                   Brand Name Dispense Instructions for use Diagnosis    FLUoxetine 20 MG capsule    PROzac    270 capsule    Take 3 capsules (60 mg) by mouth daily    Major depressive disorder, single episode, moderate (H)       metroNIDAZOLE 0.75 % topical gel    METROGEL    135 g    Apply topically 2 times daily    Rosacea       nitroFURantoin 50 MG capsule    MACRODANTIN    90 capsule    Take 1 capsule (50 mg) by mouth daily For 3 months for UTI prevention    Recurrent cystitis, Personal history of urinary tract infection       omeprazole 20 MG CR capsule    priLOSEC    90 capsule    Take 1 capsule (20 mg) by mouth daily    Gastroesophageal reflux disease without esophagitis

## 2018-09-18 NOTE — PROGRESS NOTES
SUBJECTIVE:   CC: Kristin Jimenez is an 63 year old woman who presents for preventive health visit.     Healthy Habits:    Do you get at least three servings of calcium containing foods daily (dairy, green leafy vegetables, etc.)? yes    Amount of exercise or daily activities, outside of work: does     Problems taking medications regularly No    Medication side effects: No    Have you had an eye exam in the past two years? yes    Do you see a dentist twice per year? yes    Do you have sleep apnea, excessive snoring or daytime drowsiness?no    Depression/Anxiety: The patient had a PHQ9 score of 1 and a GAD7 score of 0 today. Patient reports a case of the winter blues last year otherwise no acute issues. Patient takes 60 mg daily Fluoxetine for depression management.    GERD: No acute issues. Well controlled by Omeprazole 20 mg daily.    Osteoarthritis: Patient reports some soreness but no pain.     Hyperlipidemia: Patient reports she has not been taking her medication for hyperlipidemia management. Labs ordered today.    Recent Labs   Lab Test  06/04/16   0909  09/26/15   0858  06/15/15   0902   CHOL  172  160  202*   HDL  53  56  48*   LDL  102*  93  140*   TRIG  83  54  68   CHOLHDLRATIO   --   2.9  4.2     Glaucoma: Patient reports she is in the early stages of Glaucoma. Patient sees [facility] for Glaucoma management.    Today's PHQ-2 Score:   PHQ-2 ( 1999 Pfizer) 9/18/2018 9/14/2017   Q1: Little interest or pleasure in doing things 0 0   Q2: Feeling down, depressed or hopeless 0 0   PHQ-2 Score 0 0     Abuse: Current or Past(Physical, Sexual or Emotional)- No  Do you feel safe in your environment - Yes    Social History   Substance Use Topics     Smoking status: Never Smoker     Smokeless tobacco: Never Used     Alcohol use 1.0 oz/week     2 Standard drinks or equivalent per week      Comment: 2 glass of wine per week     If you drink alcohol do you typically have >3 drinks per day or >7  drinks per week? No                     Reviewed orders with patient.  Reviewed health maintenance and updated orders accordingly - Yes  Labs reviewed in [facility]    Patient over age 50, mutual decision to screen reflected in health maintenance.    Pertinent mammograms are reviewed under the imaging tab.    History of abnormal Pap smear: NO - age 30- 65 PAP every 3 years recommended  PAP / HPV 12/30/2013 10/24/2011 11/3/2010   PAP NIL NIL NIL     Reviewed and updated as needed this visit by clinical staff    Reviewed and updated as needed this visit by Provider    Health Maintenance     Colonoscopy:  Last 12/12/2016, Due 12/2021   FIT:  Not on file, Ordered today              Mammogram:  Last 8/8/2017, Ordered today              PAP:  Last 12/30/2013, Due   DEXA:  Last 7/13/2016, Due 7/2021    Health Maintenance Due   Topic Date Due     FIT Q1 YR  07/07/1966     WELLNESS VISIT Q1 YR  02/10/2016     PAP Q3 YR  12/30/2016     LIPID MONITORING Q1 YEAR  06/04/2017     INFLUENZA VACCINE (1) 09/01/2018     MAMMO Q1 YR  08/08/2018       Current Problem List    Patient Active Problem List   Diagnosis     Rosacea     Ulcerative colitis (H)     Major depressive disorder, single episode, moderate (H)     Osteopenia     Hyperlipidemia LDL goal <130     Health Care Home     Advanced directives, counseling/discussion     OA (osteoarthritis)     Family history of colon cancer     GERD (gastroesophageal reflux disease)     Colon polyp     Ulcerative rectosigmoiditis without complication (H)     Glaucoma       Past Medical History    Past Medical History:   Diagnosis Date     Colon polyp      Family history of colon cancer     Mom     GERD (gastroesophageal reflux disease) 2012     Glaucoma     [facility] - bilateral     Major depressive disorder, single episode, moderate (H) 5/09     OA (osteoarthritis) 2004    Lt hip moderate     Osteopenia 7/09     Rosacea 1998     Ulcerative colitis, unspecified 1985    UC - rare issues      Unspecified hemorrhoids without mention of complication        Past Surgical History    Past Surgical History:   Procedure Laterality Date     COLONOSCOPY  2012     HC COLONOSCOPY THRU STOMA, DIAGNOSTIC  2012,     colon polyp, diverticulosis, hemorrhoids - due 5-10 Yrs     HC REVISE MEDIAN N/CARPAL TUNNEL SURG      bilateral - dr acuna     SURGICAL HISTORY OF -       Dr Perales - Lt hip CARMELINA     SURGICAL HISTORY OF -       Dr Perales - Rt Hip CARMELINA       Current Medications    Current Outpatient Prescriptions   Medication Sig Dispense Refill     FLUoxetine (PROZAC) 20 MG capsule Take 3 capsules (60 mg) by mouth daily 270 capsule 3     metroNIDAZOLE (METROGEL) 0.75 % topical gel Apply topically 2 times daily 135 g 3     nitroFURantoin (MACRODANTIN) 50 MG capsule Take 1 capsule (50 mg) by mouth daily For 3 months for UTI prevention 90 capsule 3     omeprazole (PRILOSEC) 20 MG CR capsule Take 1 capsule (20 mg) by mouth daily 90 capsule 3     [DISCONTINUED] FLUoxetine (PROZAC) 20 MG capsule TAKE THREE CAPSULES BY MOUTH EVERY  capsule 1       Allergies    Allergies   Allergen Reactions     Sulfa Drugs      Rash       Immunizations    Immunization History   Administered Date(s) Administered     Pneumococcal 23 valent 10/26/2016     TD (ADULT, 7+) 2002     TDAP Vaccine (Boostrix) 10/24/2011     Zoster vaccine recombinant adjuvanted (SHINGRIX) 2018, 2018     Zoster vaccine, live 10/26/2016, 2018       Family History    Family History   Problem Relation Age of Onset     Lung Cancer Father       Lung CA - SMOKER     Lipids Sister      Lipids Brother      Cerebrovascular Disease Maternal Grandmother      C.A.D. Maternal Grandfather      Prostate Cancer Maternal Grandfather      Alzheimer Disease Paternal Grandmother      Prostate Cancer Paternal Grandfather      Eye Disorder Mother      glaucoma     Cancer - colorectal Mother      AGE 71 WITH METS  "liver cancer.  Oct. 2006     Eye Disorder Sister      histoplasmosis     Hyperlipidemia Sister      Eye Disorder Brother      histoplasmosis     Hyperlipidemia Brother      Hyperlipidemia Brother      Hyperlipidemia Sister        Social History    Social History     Social History     Marital status:      Spouse name: Compa     Number of children: 5     Years of education: 12     Occupational History      None      Social History Main Topics     Smoking status: Never Smoker     Smokeless tobacco: Never Used     Alcohol use 1.0 oz/week     2 Standard drinks or equivalent per week      Comment: 2 glass of wine per week     Drug use: No     Sexual activity: Yes     Partners: Male     Other Topics Concern     Caffeine Concern Yes     2 cups daily     Exercise Yes     3+/week     Seat Belt Yes     Parent/Sibling W/ Cabg, Mi Or Angioplasty Before 65f 55m? No     Social History Narrative     ROS:  Constitutional, HEENT, cardiovascular, pulmonary, GI, , musculoskeletal, neuro, skin, endocrine and psych systems are negative, except as otherwise noted.    OBJECTIVE:   /82  Pulse 69  Temp 98  F (36.7  C) (Oral)  Ht 5' 3\" (1.6 m)  Wt 193 lb (87.5 kg)  LMP 10/20/2003  SpO2 100%  BMI 34.19 kg/m2  EXAM:  GENERAL: healthy, alert and no distress  EYES: Eyes grossly normal to inspection, PERRL and conjunctivae and sclerae normal  HENT:ear canals and TM's normal upon viewing with otoscope, nose and mouth without ulcers or lesions upon viewing with otoscope  RESP: lungs clear to auscultation - no rales, rhonchi or wheezes  CV: regular rate and rhythm, normal S1 S2, no S3 or S4, no murmur, click or rub, no peripheral edema and peripheral pulses strong  ABDOMEN: soft, nontender, no hepatosplenomegaly, no masses and bowel sounds normal  MS: no gross musculoskeletal defects noted, no edema  SKIN: no suspicious lesions or rashes  NEURO: Normal strength and tone, mentation intact and speech normal  PSYCH: " mentation appears normal, affect normal/bright  BACK: no CVA tenderness, no paralumbar tenderness    Diagnostic Test Results:  Results for orders placed or performed in visit on 09/18/18 (from the past 24 hour(s))   Albumin Random Urine Quantitative with Creat Ratio   Result Value Ref Range    Creatinine Urine 16 mg/dL    Albumin Urine mg/L <5 mg/L    Albumin Urine mg/g Cr Unable to calculate due to low value 0 - 25 mg/g Cr   *UA reflex to Microscopic and Culture ([facility] (except [place] and [place])   Result Value Ref Range    Color Urine Yellow     Appearance Urine Clear     Glucose Urine Negative NEG^Negative mg/dL    Bilirubin Urine Negative NEG^Negative    Ketones Urine Negative NEG^Negative mg/dL    Specific Gravity Urine <=1.005 1.003 - 1.035    Blood Urine Negative NEG^Negative    pH Urine 7.0 5.0 - 7.0 pH    Protein Albumin Urine Negative NEG^Negative mg/dL    Urobilinogen Urine 0.2 0.2 - 1.0 EU/dL    Nitrite Urine Negative NEG^Negative    Leukocyte Esterase Urine Negative NEG^Negative    Source Midstream Urine        ASSESSMENT/PLAN:       ICD-10-CM    1. Encounter for routine adult health examination without abnormal findings Z00.00 MA SCREENING DIGITAL BILAT - Future  (s+30)     Comprehensive metabolic panel     Lipid panel reflex to direct LDL Fasting     CK total     CBC with platelets     TSH with free T4 reflex     Albumin Random Urine Quantitative with Creat Ratio     *UA reflex to Microscopic and Culture ([facility] (except [place] and [place])     Fecal colorectal cancer screen (FIT)     Vitamin D Deficiency     Albumin Random Urine Quantitative with Creat Ratio     *UA reflex to Microscopic and Culture ([facility] (except [place] and [place])   2. Major depressive disorder, single episode, moderate F32.1 FLUoxetine (PROZAC) 20 MG capsule     TSH with free T4 reflex   3. Glaucoma of both eyes, unspecified glaucoma type H40.9    4.  Rosacea L71.9 metroNIDAZOLE (METROGEL) 0.75 % topical gel   5. Recurrent cystitis N30.90 nitroFURantoin (MACRODANTIN) 50 MG capsule     *UA reflex to Microscopic and Culture ([facility] (except [place] and [place])     *UA reflex to Microscopic and Culture ([facility] (except [place] and [place])   6. Personal history of urinary tract infection Z87.440 nitroFURantoin (MACRODANTIN) 50 MG capsule     *UA reflex to Microscopic and Culture ([facility] (except [place] and [place])     *UA reflex to Microscopic and Culture ([facility] (except [place] and [place])   7. Hyperlipidemia LDL goal <130 E78.5 Comprehensive metabolic panel     Lipid panel reflex to direct LDL Fasting     CK total   8. Primary osteoarthritis involving multiple joints M15.0    9. Ulcerative colitis with complication, unspecified location (H) K51.919 Comprehensive metabolic panel     CBC with platelets   10. Gastroesophageal reflux disease without esophagitis K21.9 omeprazole (PRILOSEC) 20 MG CR capsule     CBC with platelets   11. Osteopenia of multiple sites M85.89 Comprehensive metabolic panel     Vitamin D Deficiency   12. Polyp of colon, unspecified part of colon, unspecified type K63.5 Fecal colorectal cancer screen (FIT)   13. Family history of colon cancer Z80.0 Fecal colorectal cancer screen (FIT)   14. Screen for colon cancer Z12.11    15. Screening for breast cancer Z12.31 MA SCREENING DIGITAL BILAT - Future  (s+30)   16. Medication monitoring encounter Z51.81 Comprehensive metabolic panel     Lipid panel reflex to direct LDL Fasting     CK total     CBC with platelets     TSH with free T4 reflex     Albumin Random Urine Quantitative with Creat Ratio     *UA reflex to Microscopic and Culture ([facility] (except [place] and [place])     Vitamin D Deficiency     Albumin Random Urine Quantitative with Creat Ratio     *UA reflex to Microscopic  "and Culture ([facility] (except [place] and [place])       Discussed treatment/modality options, including risk and benefits, she desires advised 1 multivitamin per day, advised calcium 7456-2591 mg/d and Vitamin D 800-1200 IU/d, advised dentist every 6 months, advised diet and exercise and advised opthalmologist every 1-2 years. All diagnosis above reviewed and noted above, otherwise stable.  See [facility] orders for further details.      1) Patient's PHQ9 score was 1 and GAD7 score was 0 today. Patient's depression well controlled by 60 mg daily Fluoxetine. Advised continued use. Prescription refilled today.    2) Patient reports no acute GERD issues. Recommend continue 20 mg daily Omeprezole for GERD management. Prescription refilled today.    3) Patient has not been taking hyperlipidemia medication. Labs ordered today to determine course of treatment.    4) Continue follow up with [facility] for further Glaucoma treatment.    5) Mammogram ordered today.    6) Follow up in 1 year for annual physical.    Health Maintenance Due   Topic Date Due     FIT Q1 YR  07[birth year]07/1965     WELLNESS VISIT Q1 YR  02/10/2016     PAP Q3 YR  12/30/2016     LIPID MONITORING Q1 YEAR  06/04/2017     INFLUENZA VACCINE (1) 09/01/2018     MAMMO Q1 YR  08/08/2018       COUNSELING:   Reviewed preventive health counseling, as reflected in patient instructions    BP Readings from Last 1 Encounters:   09/18/18 126/82     Estimated body mass index is 34.19 kg/(m^2) as calculated from the following:    Height as of this encounter: 5' 3\" (1.6 m).    Weight as of this encounter: 193 lb (87.5 kg).      Weight management plan: Discussed healthy diet and exercise guidelines and patient will follow up in 6 months in clinic to re-evaluate.     reports that she has never smoked. She has never used smokeless tobacco.      Counseling Resources:  ATP IV Guidelines  Pooled Cohorts Equation Calculator  Breast Cancer Risk " Calculator  FRAX Risk Assessment  ICSI Preventive Guidelines  Dietary Guidelines for Americans, 2010  USDA's MyPlate  ASA Prophylaxis  Lung CA Screening    This document serves as a record of the services and decisions personally performed and made by Louis Nixon MD. It was created on his behalf by Vinh Devries, a trained medical scribe. The creation of this document is based on the provider's statements to the medical scribe.  Vinh Devries September 18, 2018 10:53 AM     The information in this document, created by the medical scribe for me, accurately reflects the services I personally performed and the decisions made by me. I have reviewed and approved this document for accuracy prior to leaving the patient care area.  September 18, 2018 10:53 AM         Louis Nixon MD, [address]  [address] [phone] Fax

## 2018-09-18 NOTE — PATIENT INSTRUCTIONS
Preventive Health Recommendations  Female Ages 50 - 64    Yearly exam: See your health care provider every year in order to  o Review health changes.   o Discuss preventive care.    o Review your medicines if your doctor has prescribed any.      Get a Pap test every three years (unless you have an abnormal result and your provider advises testing more often).    If you get Pap tests with HPV test, you only need to test every 5 years, unless you have an abnormal result.     You do not need a Pap test if your uterus was removed (hysterectomy) and you have not had cancer.    You should be tested each year for STDs (sexually transmitted diseases) if you're at risk.     Have a mammogram every 1 to 2 years.    Have a colonoscopy at age 50, or have a yearly FIT test (stool test). These exams screen for colon cancer.      Have a cholesterol test every 5 years, or more often if advised.    Have a diabetes test (fasting glucose) every three years. If you are at risk for diabetes, you should have this test more often.     If you are at risk for osteoporosis (brittle bone disease), think about having a bone density scan (DEXA).    Shots: Get a flu shot each year. Get a tetanus shot every 10 years.    Nutrition:     Eat at least 5 servings of fruits and vegetables each day.    Eat whole-grain bread, whole-wheat pasta and brown rice instead of white grains and rice.    Get adequate Calcium and Vitamin D.     Lifestyle    Exercise at least 150 minutes a week (30 minutes a day, 5 days a week). This will help you control your weight and prevent disease.    Limit alcohol to one drink per day.    No smoking.     Wear sunscreen to prevent skin cancer.     See your dentist every six months for an exam and cleaning.    See your eye doctor every 1 to 2 years.      [facility]                        To reach your care team during and after hours:   [phone]  To reach our pharmacy:        [phone]    Clinic Hours                         Our clinic hours are:    Monday   7:30 am to 7:00 pm                  Tuesday through Friday 7:30 am to 5:00 pm                             Saturday   8:00 am to 12:00 pm      Sunday   Closed      Pharmacy Hours                        Our pharmacy hours are:    Monday   8:30 am to 7:00 pm       Tuesday to Friday  8:30 am to 6:00 pm                       Saturday    9:00 am to 1:00 pm              Sunday    Closed              There is also information available at our web site:  [email or web address]    If your provider ordered any lab tests and you do not receive the results within 10 business days, please call the clinic.    If you need a medication refill please contact your pharmacy.  Please allow 2-3 business days for your refill to be completed.    Our clinic offers telephone visits and e visits.  Please ask one of your team members to explain more.      Use [employer] (secure email communication and access to your chart) to send your primary care provider a message or make an appointment. Ask someone on your Team how to sign up for [employer].  Immunizations                      Immunization History   Administered Date(s) Administered     Pneumococcal 23 valent 10/26/2016     TD (ADULT, 7+) 12/17/2002     TDAP Vaccine (Boostrix) 10/24/2011     Zoster vaccine recombinant adjuvanted (SHINGRIX) 03/30/2018, 08/06/2018     Zoster vaccine, live 10/26/2016, 03/30/2018        Health Maintenance                         Health Maintenance Due   Topic Date Due     Colon Cancer Screening - FIT Test - yearly  07[birth year]07/1966     Wellness Visit with your Primary Provider - yearly  02/10/2016     Pap Smear - every 3 years  12/30/2016     Cholesterol Lab - yearly  06/04/2017     Flu Vaccine (1) 09/01/2018     Mammogram - yearly  08/08/2018

## 2018-09-20 ASSESSMENT — ANXIETY QUESTIONNAIRES: GAD7 TOTAL SCORE: 0

## 2018-09-20 ASSESSMENT — PATIENT HEALTH QUESTIONNAIRE - PHQ9: SUM OF ALL RESPONSES TO PHQ QUESTIONS 1-9: 1

## 2018-09-27 ENCOUNTER — RADIANT APPOINTMENT (OUTPATIENT)
Dept: MAMMOGRAPHY | Facility: CLINIC | Age: 63
End: 2018-09-27
Attending: FAMILY MEDICINE
Payer: COMMERCIAL

## 2018-09-27 DIAGNOSIS — F32.1 MAJOR DEPRESSIVE DISORDER, SINGLE EPISODE, MODERATE (H): ICD-10-CM

## 2018-09-27 DIAGNOSIS — E78.5 HYPERLIPIDEMIA LDL GOAL <130: ICD-10-CM

## 2018-09-27 DIAGNOSIS — Z12.39 SCREENING FOR BREAST CANCER: ICD-10-CM

## 2018-09-27 DIAGNOSIS — K21.9 GASTROESOPHAGEAL REFLUX DISEASE WITHOUT ESOPHAGITIS: ICD-10-CM

## 2018-09-27 DIAGNOSIS — Z00.00 ENCOUNTER FOR ROUTINE ADULT HEALTH EXAMINATION WITHOUT ABNORMAL FINDINGS: ICD-10-CM

## 2018-09-27 DIAGNOSIS — Z51.81 MEDICATION MONITORING ENCOUNTER: ICD-10-CM

## 2018-09-27 DIAGNOSIS — M85.89 OSTEOPENIA OF MULTIPLE SITES: ICD-10-CM

## 2018-09-27 DIAGNOSIS — K51.919 ULCERATIVE COLITIS WITH COMPLICATION, UNSPECIFIED LOCATION (H): ICD-10-CM

## 2018-09-27 LAB
ALBUMIN SERPL-MCNC: 4 G/DL (ref 3.4–5)
ALP SERPL-CCNC: 83 U/L (ref 40–150)
ALT SERPL W P-5'-P-CCNC: 29 U/L (ref 0–50)
ANION GAP SERPL CALCULATED.3IONS-SCNC: 6 MMOL/L (ref 3–14)
AST SERPL W P-5'-P-CCNC: 19 U/L (ref 0–45)
BILIRUB SERPL-MCNC: 0.5 MG/DL (ref 0.2–1.3)
BUN SERPL-MCNC: 11 MG/DL (ref 7–30)
CALCIUM SERPL-MCNC: 9.6 MG/DL (ref 8.5–10.1)
CHLORIDE SERPL-SCNC: 102 MMOL/L (ref 94–109)
CHOLEST SERPL-MCNC: 261 MG/DL
CK SERPL-CCNC: 80 U/L (ref 30–225)
CO2 SERPL-SCNC: 27 MMOL/L (ref 20–32)
CREAT SERPL-MCNC: 0.65 MG/DL (ref 0.52–1.04)
ERYTHROCYTE [DISTWIDTH] IN BLOOD BY AUTOMATED COUNT: 12.9 % (ref 10–15)
GFR SERPL CREATININE-BSD FRML MDRD: >90 ML/MIN/1.7M2
GLUCOSE SERPL-MCNC: 86 MG/DL (ref 70–99)
HCT VFR BLD AUTO: 39.3 % (ref 35–47)
HDLC SERPL-MCNC: 56 MG/DL
HGB BLD-MCNC: 12.8 G/DL (ref 11.7–15.7)
LDLC SERPL CALC-MCNC: 189 MG/DL
MCH RBC QN AUTO: 30.4 PG (ref 26.5–33)
MCHC RBC AUTO-ENTMCNC: 32.6 G/DL (ref 31.5–36.5)
MCV RBC AUTO: 93 FL (ref 78–100)
NONHDLC SERPL-MCNC: 205 MG/DL
PLATELET # BLD AUTO: 378 10E9/L (ref 150–450)
POTASSIUM SERPL-SCNC: 4.3 MMOL/L (ref 3.4–5.3)
PROT SERPL-MCNC: 8.1 G/DL (ref 6.8–8.8)
RBC # BLD AUTO: 4.21 10E12/L (ref 3.8–5.2)
SODIUM SERPL-SCNC: 135 MMOL/L (ref 133–144)
TRIGL SERPL-MCNC: 79 MG/DL
TSH SERPL DL<=0.005 MIU/L-ACNC: 1.97 MU/L (ref 0.4–4)
WBC # BLD AUTO: 5.7 10E9/L (ref 4–11)

## 2018-09-27 PROCEDURE — 82550 ASSAY OF CK (CPK): CPT | Performed by: FAMILY MEDICINE

## 2018-09-27 PROCEDURE — 36415 COLL VENOUS BLD VENIPUNCTURE: CPT | Performed by: FAMILY MEDICINE

## 2018-09-27 PROCEDURE — 85027 COMPLETE CBC AUTOMATED: CPT | Performed by: FAMILY MEDICINE

## 2018-09-27 PROCEDURE — 80053 COMPREHEN METABOLIC PANEL: CPT | Performed by: FAMILY MEDICINE

## 2018-09-27 PROCEDURE — 84443 ASSAY THYROID STIM HORMONE: CPT | Performed by: FAMILY MEDICINE

## 2018-09-27 PROCEDURE — 82306 VITAMIN D 25 HYDROXY: CPT | Performed by: FAMILY MEDICINE

## 2018-09-27 PROCEDURE — 77067 SCR MAMMO BI INCL CAD: CPT | Mod: TC

## 2018-09-27 PROCEDURE — 80061 LIPID PANEL: CPT | Performed by: FAMILY MEDICINE

## 2018-09-28 LAB — DEPRECATED CALCIDIOL+CALCIFEROL SERPL-MC: 72 UG/L (ref 20–75)

## 2018-10-01 DIAGNOSIS — E78.5 HYPERLIPIDEMIA LDL GOAL <130: Primary | ICD-10-CM

## 2018-10-01 RX ORDER — ROSUVASTATIN CALCIUM 10 MG/1
10 TABLET, COATED ORAL DAILY
Qty: 90 TABLET | Refills: 3 | Status: SHIPPED | OUTPATIENT
Start: 2018-10-01 | End: 2019-10-27

## 2018-10-18 DIAGNOSIS — Z87.440 PERSONAL HISTORY OF URINARY TRACT INFECTION: Primary | ICD-10-CM

## 2018-12-09 DIAGNOSIS — K21.9 GASTROESOPHAGEAL REFLUX DISEASE WITHOUT ESOPHAGITIS: ICD-10-CM

## 2018-12-10 NOTE — TELEPHONE ENCOUNTER
"Requested Prescriptions   Pending Prescriptions Disp Refills     omeprazole (PRILOSEC) 20 MG DR capsule [Pharmacy Med Name: OMEPRAZOLE 20MG CPDR] 90 capsule 1    Last Written Prescription Date:  9.18.18  Last Fill Quantity: 90,  # refills: 3   Last Office Visit: 9/18/2018   Future Office Visit:      Sig: TAKE ONE CAPSULE BY MOUTH ONCE DAILY    PPI Protocol Passed - 12/9/2018 12:21 PM       Passed - Not on Clopidogrel (unless Pantoprazole ordered)       Passed - No diagnosis of osteoporosis on record       Passed - Recent (12 mo) or future (30 days) visit within the authorizing provider's specialty    Patient had office visit in the last 12 months or has a visit in the next 30 days with authorizing provider or within the authorizing provider's specialty.  See \"Patient Info\" tab in inbasket, or \"Choose Columns\" in Meds & Orders section of the refill encounter.             Passed - Patient is age 18 or older       Passed - No active pregnacy on record       Passed - No positive pregnancy test in past 12 months          "

## 2018-12-10 NOTE — TELEPHONE ENCOUNTER
Pt should not need refills.   Alerting pharmacy.  Lizeth Fuentes RN  [facility]

## 2019-02-19 DIAGNOSIS — F32.1 MAJOR DEPRESSIVE DISORDER, SINGLE EPISODE, MODERATE (H): ICD-10-CM

## 2019-02-19 NOTE — TELEPHONE ENCOUNTER
"Requested Prescriptions   Pending Prescriptions Disp Refills     FLUoxetine (PROZAC) 20 MG capsule [Pharmacy Med Name: FLUOXETINE HCL 20MG CAPS] 270 capsule 1     Sig: TAKE THREE CAPSULES BY MOUTH EVERY DAY    SSRIs Protocol Passed - 2/19/2019  9:38 AM       Passed - PHQ-9 score less than 5 in past 6 months    PHQ-9 SCORE 12/26/2017 6/18/2018 9/18/2018   PHQ-9 Total Score - - -   PHQ-9 Total Score 0 2 1     KRISTI-7 SCORE 12/26/2017 6/18/2018 9/18/2018   Total Score - - -   Total Score 0 1 0          Last Written Prescription Date:  9/18/2018  Last Fill Quantity: 270,  # refills: 3   Last office visit: 9/18/2018 with prescribing provider:  LLOYD Nixon  Future Office Visit:        Passed - Medication is active on med list       Passed - Patient is age 18 or older       Passed - No active pregnancy on record       Passed - No positive pregnancy test in last 12 months       Passed - Recent (6 mo) or future (30 days) visit within the authorizing provider's specialty    Patient had office visit in the last 6 months or has a visit in the next 30 days with authorizing provider or within the authorizing provider's specialty.  See \"Patient Info\" tab in inbasket, or \"Choose Columns\" in Meds & Orders section of the refill encounter.              "

## 2019-06-04 ENCOUNTER — TELEPHONE (OUTPATIENT)
Dept: FAMILY MEDICINE | Facility: CLINIC | Age: 64
End: 2019-06-04

## 2019-06-04 NOTE — TELEPHONE ENCOUNTER
Needs of attention regarding:  -Colon Cancer Screening  -Cervical Cancer Screening    Health Maintenance Topics with due status: Overdue       Topic Date Due    FIT 07/07/1966    PAP 12/30/2016    ZOSTER IMMUNIZATION 10/01/2018    PHQ-9 03/18/2019       Communication:  See Letter

## 2019-06-04 NOTE — LETTER
[address] [address]                                                                                                       [phone]  June 4, 2019    Kristin Jimenez  [address] [facility] [place] [address]      Dear Kristin,    A review of your record shows that you are due for the following health maintenance exam(s):    -Colon Cancer Screening- Recommended every 5-10 years, depending on your history, in order to prevent and detect colon cancer at its earliest stages.  Colon cancer is now the second leading cause of death in the [place] for both men and women and there are over 130,000 new cases and 50,000 deaths per year from colon cancer.  Colonoscopies can prevent 90-95% of these deaths.  Problem lesions can be removed before they ever become cancer.  This test is not only looking for cancer, but also getting rid of precancerious lesions.  You are usually given some sedation which makes the test very comfortable for most people.      If you do not wish to do a colonoscopy or cannot afford to do one, at this time, there is another option. It is called a FIT test or Fecal Immunochemical Occult Blood Test (take home stool sample kit).  It does not replace the colonoscopy for colorectal cancer screening, but it can detect hidden bleeding in the lower colon.  It does need to be repeated every year and if a positive result is obtained, you would be referred for a colonoscopy. The FIT test is really easy to do and does not require any  diet or medication restrictions and involves only one collection sample.      If you have completed either one of these tests or had a flexible sigmoidoscopy in the past five years at another facility, please have the records sent to our clinic so that we can best coordinate your care.  Please call us ([phone]) if you have questions or would like arrange either to do a colonoscopy or  obtain the necessary test kit for the Fecal Occult Test.                  -Pap Smear- a screening test done during a pelvic exam to check for abnormal changes in the cells of the cervix. The cervix is the lower part of the uterus that opens into the vagina. Abnormal cells can develop into cancer if not detected and treated. There are no signs or symptoms related to early cervical cancer so a pelvic exam of the female sex organs and a Pap test are needed. Cervical cancer is preventable and curable if abnormal cells are detected and treated early. Pap tests have reduced deaths from cancer of the cervix in the [place] by 70% over the past 50 years.  Please call to arrange this for you or you can also schedule this through [employer] (online medical record access).  Please disregard this reminder if you have already scheduled or have had this exam elsewhere within the last year.  It would be helpful for us to have a copy of your pap smear report in our file so that we can best coordinate your care.                                                                                         In addition, if we see you for your annual health care maintenance exam (complete physical), and it has been over a year since your last exam, then please schedule that as well.    Thank you very much for choosing [facility].   We appreciate the opportunity to serve you and look forward to supporting your healthcare needs in the future.    Sincerely,          Louis Nixon M.D.

## 2019-10-01 ENCOUNTER — HEALTH MAINTENANCE LETTER (OUTPATIENT)
Age: 64
End: 2019-10-01

## 2019-10-27 DIAGNOSIS — E78.5 HYPERLIPIDEMIA LDL GOAL <130: ICD-10-CM

## 2019-10-27 DIAGNOSIS — Z87.440 PERSONAL HISTORY OF URINARY TRACT INFECTION: ICD-10-CM

## 2019-10-27 DIAGNOSIS — N30.90 RECURRENT CYSTITIS: ICD-10-CM

## 2019-10-28 NOTE — TELEPHONE ENCOUNTER
"Requested Prescriptions   Pending Prescriptions Disp Refills     rosuvastatin (CRESTOR) 10 MG tablet [Pharmacy Med Name: ROSUVASTATIN CALCIUM 10MG TABS] 90 tablet 3     Sig: TAKE ONE TABLET BY MOUTH ONCE DAILY       Last Written Prescription Date:  10/1/2018  Last Fill Quantity: 90,  # refills: 3   Last office visit: 9/18/2018 with prescribing provider:     Future Office Visit:          Statins Protocol Failed - 10/27/2019  1:18 PM        Failed - LDL on file in past 12 months     Recent Labs   Lab Test 09/27/18  1022   *             Failed - Recent (12 mo) or future (30 days) visit within the authorizing provider's specialty     Patient has had an office visit with the authorizing provider or a provider within the authorizing providers department within the previous 12 mos or has a future within next 30 days. See \"Patient Info\" tab in inbasket, or \"Choose Columns\" in Meds & Orders section of the refill encounter.              Passed - No abnormal creatine kinase in past 12 months     Recent Labs   Lab Test 09/27/18  1022   CKT 80                Passed - Medication is active on med list        Passed - Patient is age 18 or older        Passed - No active pregnancy on record        Passed - No positive pregnancy test in past 12 months        nitroFURantoin macrocrystal (MACRODANTIN) 50 MG capsule [Pharmacy Med Name: NITROFURANTOIN MACROCRYSTAL 50 CAPS] 90 capsule 3     Sig: TAKE ONE CAPSULE BY MOUTH ONCE DAILY FOR 3 MONTHS FOR UTI PREVENTION       Last Written Prescription Date:  9/18/2018  Last Fill Quantity: 90,  # refills: 3   Last office visit: 9/18/2018 with prescribing provider:     Future Office Visit:          There is no refill protocol information for this order        "

## 2019-10-30 NOTE — TELEPHONE ENCOUNTER
Patient due for fasting office visit-   Routing to team to schedule appointment   Please route to provider after appointment has been scheduled    Ernestina GIFFORD RN  [facility]  [phone]

## 2019-10-31 RX ORDER — ROSUVASTATIN CALCIUM 10 MG/1
TABLET, COATED ORAL
Qty: 60 TABLET | Refills: 0 | Status: SHIPPED | OUTPATIENT
Start: 2019-10-31 | End: 2019-12-14

## 2019-10-31 RX ORDER — NITROFURANTOIN MACROCRYSTALS 50 MG/1
CAPSULE ORAL
Qty: 60 CAPSULE | Refills: 0 | Status: SHIPPED | OUTPATIENT
Start: 2019-10-31 | End: 2020-01-20

## 2019-11-25 DIAGNOSIS — F32.1 MAJOR DEPRESSIVE DISORDER, SINGLE EPISODE, MODERATE (H): ICD-10-CM

## 2019-11-25 NOTE — TELEPHONE ENCOUNTER
Medication is being filled for 1 time refill only due to:  Patient needs to be seen because it has been more than one year since last visit.    Judy Sweeney RN  [facility]

## 2019-11-25 NOTE — TELEPHONE ENCOUNTER
"Requested Prescriptions   Pending Prescriptions Disp Refills     FLUoxetine (PROZAC) 20 MG capsule [Pharmacy Med Name: FLUOXETINE HCL 20MG CAPS]    Last Written Prescription Date:  9/18/18  Last Fill Quantity: 270 capsule,  # refills: 3   Last office visit: 9/18/2018 with prescribing provider:  Hussain Greenberg Office Visit:   Next 5 appointments (look out 90 days)    Dec 14, 2019  8:40 AM CST  Office Visit with ALINA Mckeon CNP  [facility] ([facility]) [address] [place] [id number] [phone]          270 capsule 3     Sig: TAKE THREE CAPSULES BY MOUTH EVERY DAY       SSRIs Protocol Failed - 11/25/2019 11:04 AM        Failed - PHQ-9 score less than 5 in past 6 months     Please review last PHQ-9 score.   PHQ-9 SCORE 12/26/2017 6/18/2018 9/18/2018   PHQ-9 Total Score - - -   PHQ-9 Total Score 0 2 1     KRISTI-7 SCORE 12/26/2017 6/18/2018 9/18/2018   Total Score - - -   Total Score 0 1 0                 Passed - Medication is active on med list        Passed - Patient is age 18 or older        Passed - No active pregnancy on record        Passed - No positive pregnancy test in last 12 months        Passed - Recent (6 mo) or future (30 days) visit within the authorizing provider's specialty     Patient had office visit in the last 6 months or has a visit in the next 30 days with authorizing provider or within the authorizing provider's specialty.  See \"Patient Info\" tab in inbasket, or \"Choose Columns\" in Meds & Orders section of the refill encounter.            "

## 2019-12-14 ENCOUNTER — OFFICE VISIT (OUTPATIENT)
Dept: FAMILY MEDICINE | Facility: CLINIC | Age: 64
End: 2019-12-14
Payer: COMMERCIAL

## 2019-12-14 VITALS
BODY MASS INDEX: 34.02 KG/M2 | WEIGHT: 192 LBS | HEIGHT: 63 IN | SYSTOLIC BLOOD PRESSURE: 132 MMHG | TEMPERATURE: 98.3 F | OXYGEN SATURATION: 99 % | DIASTOLIC BLOOD PRESSURE: 80 MMHG | HEART RATE: 77 BPM

## 2019-12-14 DIAGNOSIS — F32.1 MAJOR DEPRESSIVE DISORDER, SINGLE EPISODE, MODERATE (H): ICD-10-CM

## 2019-12-14 DIAGNOSIS — E78.5 HYPERLIPIDEMIA LDL GOAL <130: Primary | ICD-10-CM

## 2019-12-14 DIAGNOSIS — K21.9 GASTROESOPHAGEAL REFLUX DISEASE WITHOUT ESOPHAGITIS: ICD-10-CM

## 2019-12-14 DIAGNOSIS — Z12.31 VISIT FOR SCREENING MAMMOGRAM: ICD-10-CM

## 2019-12-14 LAB
ALBUMIN SERPL-MCNC: 3.9 G/DL (ref 3.4–5)
ALP SERPL-CCNC: 78 U/L (ref 40–150)
ALT SERPL W P-5'-P-CCNC: 32 U/L (ref 0–50)
ANION GAP SERPL CALCULATED.3IONS-SCNC: 5 MMOL/L (ref 3–14)
AST SERPL W P-5'-P-CCNC: 17 U/L (ref 0–45)
BILIRUB SERPL-MCNC: 0.5 MG/DL (ref 0.2–1.3)
BUN SERPL-MCNC: 10 MG/DL (ref 7–30)
CALCIUM SERPL-MCNC: 9.2 MG/DL (ref 8.5–10.1)
CHLORIDE SERPL-SCNC: 106 MMOL/L (ref 94–109)
CHOLEST SERPL-MCNC: 172 MG/DL
CK SERPL-CCNC: 70 U/L (ref 30–225)
CO2 SERPL-SCNC: 27 MMOL/L (ref 20–32)
CREAT SERPL-MCNC: 0.64 MG/DL (ref 0.52–1.04)
CREAT UR-MCNC: 64 MG/DL
GFR SERPL CREATININE-BSD FRML MDRD: >90 ML/MIN/{1.73_M2}
GLUCOSE SERPL-MCNC: 86 MG/DL (ref 70–99)
HDLC SERPL-MCNC: 54 MG/DL
LDLC SERPL CALC-MCNC: 101 MG/DL
MICROALBUMIN UR-MCNC: <5 MG/L
MICROALBUMIN/CREAT UR: NORMAL MG/G CR (ref 0–25)
NONHDLC SERPL-MCNC: 118 MG/DL
POTASSIUM SERPL-SCNC: 4 MMOL/L (ref 3.4–5.3)
PROT SERPL-MCNC: 7.7 G/DL (ref 6.8–8.8)
SODIUM SERPL-SCNC: 138 MMOL/L (ref 133–144)
TRIGL SERPL-MCNC: 83 MG/DL
TSH SERPL DL<=0.005 MIU/L-ACNC: 2.45 MU/L (ref 0.4–4)

## 2019-12-14 PROCEDURE — 81003 URINALYSIS AUTO W/O SCOPE: CPT | Performed by: NURSE PRACTITIONER

## 2019-12-14 PROCEDURE — 36415 COLL VENOUS BLD VENIPUNCTURE: CPT | Performed by: NURSE PRACTITIONER

## 2019-12-14 PROCEDURE — 82550 ASSAY OF CK (CPK): CPT | Performed by: NURSE PRACTITIONER

## 2019-12-14 PROCEDURE — 80053 COMPREHEN METABOLIC PANEL: CPT | Performed by: NURSE PRACTITIONER

## 2019-12-14 PROCEDURE — 80061 LIPID PANEL: CPT | Performed by: NURSE PRACTITIONER

## 2019-12-14 PROCEDURE — 84443 ASSAY THYROID STIM HORMONE: CPT | Performed by: NURSE PRACTITIONER

## 2019-12-14 PROCEDURE — 82043 UR ALBUMIN QUANTITATIVE: CPT | Performed by: NURSE PRACTITIONER

## 2019-12-14 PROCEDURE — 99213 OFFICE O/P EST LOW 20 MIN: CPT | Performed by: NURSE PRACTITIONER

## 2019-12-14 RX ORDER — ROSUVASTATIN CALCIUM 10 MG/1
10 TABLET, COATED ORAL DAILY
Qty: 60 TABLET | Refills: 1 | Status: SHIPPED | OUTPATIENT
Start: 2019-12-14 | End: 2020-01-20

## 2019-12-14 ASSESSMENT — MIFFLIN-ST. JEOR: SCORE: 1395.04

## 2019-12-14 ASSESSMENT — ANXIETY QUESTIONNAIRES
1. FEELING NERVOUS, ANXIOUS, OR ON EDGE: NOT AT ALL
3. WORRYING TOO MUCH ABOUT DIFFERENT THINGS: NOT AT ALL
2. NOT BEING ABLE TO STOP OR CONTROL WORRYING: NOT AT ALL
GAD7 TOTAL SCORE: 1
6. BECOMING EASILY ANNOYED OR IRRITABLE: SEVERAL DAYS
5. BEING SO RESTLESS THAT IT IS HARD TO SIT STILL: NOT AT ALL
IF YOU CHECKED OFF ANY PROBLEMS ON THIS QUESTIONNAIRE, HOW DIFFICULT HAVE THESE PROBLEMS MADE IT FOR YOU TO DO YOUR WORK, TAKE CARE OF THINGS AT HOME, OR GET ALONG WITH OTHER PEOPLE: NOT DIFFICULT AT ALL
7. FEELING AFRAID AS IF SOMETHING AWFUL MIGHT HAPPEN: NOT AT ALL

## 2019-12-14 ASSESSMENT — PATIENT HEALTH QUESTIONNAIRE - PHQ9
SUM OF ALL RESPONSES TO PHQ QUESTIONS 1-9: 2
5. POOR APPETITE OR OVEREATING: NOT AT ALL

## 2019-12-14 NOTE — PROGRESS NOTES
Subjective     Kristin Jimenez is a 64 year old female who presents to clinic today for the following health issues:    HPI   Hyperlipidemia Follow-Up      Are you regularly taking any medication or supplement to lower your cholesterol?   Yes- crestor     Are you having muscle aches or other side effects that you think could be caused by your cholesterol lowering medication?  No    LDL Cholesterol Calculated   Date Value Ref Range Status   12/14/2019 101 (H) <100 mg/dL Final     Comment:     Above desirable:  100-129 mg/dl  Borderline High:  130-159 mg/dL  High:             160-189 mg/dL  Very high:       >189 mg/dl     09/27/2018 189 (H) <100 mg/dL Final     Comment:     Above desirable:  100-129 mg/dl  Borderline High:  130-159 mg/dL  High:             160-189 mg/dL  Very high:       >189 mg/dl       Reviewed recent lab work with patient today    Depression Followup    How are you doing with your depression since your last visit? Doing good    Are you having other symptoms that might be associated with depression? No    Have you had a significant life event?  No     Are you feeling anxious or having panic attacks?   No    Do you have any concerns with your use of alcohol or other drugs? No    Social History     Tobacco Use     Smoking status: Never Smoker     Smokeless tobacco: Never Used   Substance Use Topics     Alcohol use: Yes     Alcohol/week: 1.7 standard drinks     Types: 2 Standard drinks or equivalent per week     Comment: 2 glass of wine per week     Drug use: No     PHQ 6/18/2018 9/18/2018 12/14/2019   PHQ-9 Total Score 2 1 2   Q9: Thoughts of better off dead/self-harm past 2 weeks Not at all Not at all Not at all     KRISTI-7 SCORE 6/18/2018 9/18/2018 12/14/2019   Total Score - - -   Total Score 1 0 1     Patient Active Problem List   Diagnosis     Rosacea     Ulcerative colitis (H)     Major depressive disorder, single episode, moderate (H)     Osteopenia     Hyperlipidemia LDL goal <130     Health  Care Home     Advanced directives, counseling/discussion     OA (osteoarthritis)     Family history of colon cancer     GERD (gastroesophageal reflux disease)     Colon polyp     Ulcerative rectosigmoiditis without complication (H)     Glaucoma     Past Surgical History:   Procedure Laterality Date     COLONOSCOPY  2012     HC COLONOSCOPY THRU STOMA, DIAGNOSTIC  2012,     colon polyp, diverticulosis, hemorrhoids - due 5-10 Yrs     HC REVISE MEDIAN N/CARPAL TUNNEL SURG      bilateral - dr acuna     SURGICAL HISTORY OF -       Dr Perales - Lt hip CARMELINA     SURGICAL HISTORY OF -       Dr Perales - Rt Hip CARMELINA       Social History     Tobacco Use     Smoking status: Never Smoker     Smokeless tobacco: Never Used   Substance Use Topics     Alcohol use: Yes     Alcohol/week: 1.7 standard drinks     Types: 2 Standard drinks or equivalent per week     Comment: 2 glass of wine per week     Family History   Problem Relation Age of Onset     Lung Cancer Father          Lung CA - SMOKER     Lipids Sister      Lipids Brother      Cerebrovascular Disease Maternal Grandmother      C.A.D. Maternal Grandfather      Prostate Cancer Maternal Grandfather      Alzheimer Disease Paternal Grandmother      Prostate Cancer Paternal Grandfather      Eye Disorder Mother         glaucoma     Cancer - colorectal Mother         AGE 71 WITH METS liver cancer.  Oct. 2006     Eye Disorder Sister         histoplasmosis     Hyperlipidemia Sister      Eye Disorder Brother         histoplasmosis     Hyperlipidemia Brother      Hyperlipidemia Brother      Hyperlipidemia Sister          Current Outpatient Medications   Medication Sig Dispense Refill     FLUoxetine (PROZAC) 20 MG capsule TAKE THREE CAPSULES BY MOUTH EVERY DAY 90 capsule 1     metroNIDAZOLE (METROGEL) 0.75 % topical gel Apply topically 2 times daily 135 g 3     nitroFURantoin macrocrystal (MACRODANTIN) 50 MG capsule TAKE ONE CAPSULE BY MOUTH  "ONCE DAILY FOR 3 MONTHS FOR UTI PREVENTION 60 capsule 0     omeprazole (PRILOSEC) 20 MG DR capsule Take 1 capsule (20 mg) by mouth daily 90 capsule 1     rosuvastatin (CRESTOR) 10 MG tablet Take 1 tablet (10 mg) by mouth daily 60 tablet 1     Allergies   Allergen Reactions     Sulfa Drugs      Rash     Recent Labs   Lab Test 12/14/19  0927 09/27/18  1022 10/26/16  1456 06/04/16  0909   * 189*  --  102*   HDL 54 56  --  53   TRIG 83 79  --  83   ALT 32 29 31 29   CR 0.64 0.65 0.65 0.70   GFRESTIMATED >90 >90 >90  Non  GFR Calc   86   GFRESTBLACK >90 >90 >90   GFR Calc   >90   GFR Calc     POTASSIUM 4.0 4.3 4.2 4.5   TSH 2.45 1.97  --   --       BP Readings from Last 3 Encounters:   12/14/19 132/80   09/18/18 126/82   06/18/18 116/68    Wt Readings from Last 3 Encounters:   12/14/19 87.1 kg (192 lb)   09/18/18 87.5 kg (193 lb)   06/18/18 87.1 kg (192 lb)         Reviewed and updated as needed this visit by Provider       Review of Systems   ROS COMP: Constitutional, HEENT, cardiovascular, pulmonary, gi and gu systems are negative, except as otherwise noted.      Objective    /80   Pulse 77   Temp 98.3  F (36.8  C) (Oral)   Ht 1.6 m (5' 3\")   Wt 87.1 kg (192 lb)   LMP 10/20/2003   SpO2 99%   BMI 34.01 kg/m    Body mass index is 34.01 kg/m .  Physical Exam   GENERAL: healthy, alert and no distress  RESP: lungs clear to auscultation - no rales, rhonchi or wheezes  CV: regular rate and rhythm, normal S1 S2, no S3 or S4, no murmur, click or rub, no peripheral edema and peripheral pulses strong  PSYCH: mentation appears normal, affect normal/bright    Diagnostic Test Results:  Labs reviewed in Epic  Additional lab work today         Assessment & Plan   (Z12.31) Visit for screening mammogram  (primary encounter diagnosis)  Comment:   Plan: *MA Screening Digital Bilateral            (F32.1) Major depressive disorder, single episode, moderate  Comment:   Plan: " "FLUoxetine (PROZAC) 20 MG capsule, TSH with         free T4 reflex            (K21.9) Gastroesophageal reflux disease without esophagitis  Comment:   Plan: omeprazole (PRILOSEC) 20 MG DR capsule           (E78.5) Hyperlipidemia LDL goal <130  Comment:  Plan: rosuvastatin (CRESTOR) 10 MG tablet, Albumin         Random Urine Quantitative with Creat Ratio             BMI:   Estimated body mass index is 34.01 kg/m  as calculated from the following:    Height as of this encounter: 1.6 m (5' 3\").    Weight as of this encounter: 87.1 kg (192 lb).       See Patient Instructions  Additional lab work today.    Continue current plan of care  Follow-up for a routine physical examination   Return to clinic if no improvement or symptoms worsen.  Patient verbalized understanding & agreed with plan of care.    ALINA Mckeon, CNP  [facility] [place]      "

## 2019-12-15 ENCOUNTER — HEALTH MAINTENANCE LETTER (OUTPATIENT)
Age: 64
End: 2019-12-15

## 2019-12-15 ASSESSMENT — ANXIETY QUESTIONNAIRES: GAD7 TOTAL SCORE: 1

## 2019-12-16 LAB
ALBUMIN UR-MCNC: NEGATIVE MG/DL
APPEARANCE UR: CLEAR
BILIRUB UR QL STRIP: NEGATIVE
COLOR UR AUTO: YELLOW
GLUCOSE UR STRIP-MCNC: NEGATIVE MG/DL
HGB UR QL STRIP: NEGATIVE
KETONES UR STRIP-MCNC: NEGATIVE MG/DL
LEUKOCYTE ESTERASE UR QL STRIP: ABNORMAL
NITRATE UR QL: NEGATIVE
PH UR STRIP: 7.5 PH (ref 5–7)
SOURCE: ABNORMAL
SP GR UR STRIP: 1.02 (ref 1–1.03)
UROBILINOGEN UR STRIP-ACNC: 0.2 EU/DL (ref 0.2–1)

## 2019-12-17 NOTE — RESULT ENCOUNTER NOTE
Dear Jahaira,    Here is a summary of your recent test results:  -Cholesterol levels are at your goal levels.  ADVISE: continuing your medication, a regular exercise program with at least 150 minutes of aerobic exercise per week, and eating a low saturated fat/low carbohydrate diet.  Also, you should recheck this fasting cholesterol panel in 12 months.  -Liver and gallbladder tests are normal (ALT,AST, Alk phos, bilirubin), kidney function is normal (Cr, GFR), sodium is normal, potassium is normal, calcium is normal, glucose is normal.  -CK (muscle enzyme test) is normal.    For additional lab test information, labtestsonline.org is an excellent reference.    In addition, here is a list of due or overdue Health Maintenance reminders:    PAP Smear / HPV test due on 12/30/2018  Preventive Care Visit due on 09/18/2019  Mammogram due on 09/27/2019    Please call us at [phone] (or use [employer]) to address the above recommendations if needed.           Thank you very much for trusting me and [facility].     Healthy regards,  Rod Alba MD (I am covering for Louis Nixon MD  who is away from the office today.)

## 2020-01-16 DIAGNOSIS — E78.5 HYPERLIPIDEMIA LDL GOAL <130: ICD-10-CM

## 2020-01-16 DIAGNOSIS — Z87.440 PERSONAL HISTORY OF URINARY TRACT INFECTION: ICD-10-CM

## 2020-01-16 DIAGNOSIS — N30.90 RECURRENT CYSTITIS: ICD-10-CM

## 2020-01-20 RX ORDER — NITROFURANTOIN MACROCRYSTALS 50 MG/1
CAPSULE ORAL
Qty: 60 CAPSULE | Refills: 0 | Status: SHIPPED | OUTPATIENT
Start: 2020-01-20 | End: 2020-06-07

## 2020-01-20 RX ORDER — ROSUVASTATIN CALCIUM 10 MG/1
TABLET, COATED ORAL
Qty: 90 TABLET | Refills: 0 | Status: SHIPPED | OUTPATIENT
Start: 2020-01-20 | End: 2020-06-03

## 2020-01-20 NOTE — TELEPHONE ENCOUNTER
Routing refill request to provider for review/approval because:  Drug not on the [facility] refill protocol   Need PCP to sign renewal for Rx.    FRANKLIN MarshallN, RN  [facility]

## 2020-01-24 ENCOUNTER — OFFICE VISIT (OUTPATIENT)
Dept: FAMILY MEDICINE | Facility: CLINIC | Age: 65
End: 2020-01-24
Payer: COMMERCIAL

## 2020-01-24 VITALS
SYSTOLIC BLOOD PRESSURE: 128 MMHG | TEMPERATURE: 99 F | HEART RATE: 97 BPM | OXYGEN SATURATION: 98 % | HEIGHT: 63 IN | BODY MASS INDEX: 34.02 KG/M2 | WEIGHT: 192 LBS | DIASTOLIC BLOOD PRESSURE: 76 MMHG

## 2020-01-24 DIAGNOSIS — J06.9 ACUTE URI: Primary | ICD-10-CM

## 2020-01-24 DIAGNOSIS — J01.90 ACUTE NON-RECURRENT SINUSITIS, UNSPECIFIED LOCATION: ICD-10-CM

## 2020-01-24 DIAGNOSIS — R50.9 FEVER, UNSPECIFIED FEVER CAUSE: ICD-10-CM

## 2020-01-24 LAB
DEPRECATED S PYO AG THROAT QL EIA: NORMAL
FLUAV+FLUBV AG SPEC QL: NEGATIVE
FLUAV+FLUBV AG SPEC QL: NEGATIVE
SPECIMEN SOURCE: NORMAL
SPECIMEN SOURCE: NORMAL

## 2020-01-24 PROCEDURE — 99213 OFFICE O/P EST LOW 20 MIN: CPT | Performed by: FAMILY MEDICINE

## 2020-01-24 PROCEDURE — 87804 INFLUENZA ASSAY W/OPTIC: CPT | Performed by: FAMILY MEDICINE

## 2020-01-24 PROCEDURE — 87081 CULTURE SCREEN ONLY: CPT | Performed by: FAMILY MEDICINE

## 2020-01-24 PROCEDURE — 87880 STREP A ASSAY W/OPTIC: CPT | Performed by: FAMILY MEDICINE

## 2020-01-24 RX ORDER — AZITHROMYCIN 250 MG/1
TABLET, FILM COATED ORAL
Qty: 6 TABLET | Refills: 0 | Status: SHIPPED | OUTPATIENT
Start: 2020-01-24 | End: 2020-06-07

## 2020-01-24 ASSESSMENT — MIFFLIN-ST. JEOR: SCORE: 1395.04

## 2020-01-24 NOTE — PROGRESS NOTES
SUBJECTIVE    Kristin Jimenez is a 64 year old female who presents to clinic today for the following health issues:    Acute Illness     Acute illness concerns: sinus  Onset: yesterday      Fever: YES- low grade    Chills/Sweats: YES    Headache (location?): YES    Sinus Pressure:YES    Conjunctivitis:  YES: bilateral watery    Ear Pain: no    Rhinorrhea: YES - rhinitis light with PND    Congestion: YES - nasal/chest    Sore Throat: YES     Cough: YES-productive of green sputum - taste of blood    Wheeze: no    Decreased Appetite: no    Nausea: no    Vomiting: no    Diarrhea:  no    Dysuria/Freq.: no    Fatigue/Achiness: no    Sick/Strep Exposure: YES- runs a  - 1 child out with similar sx     Therapies Tried and outcome: mucinex     Lipids - rosuvastatin    Recent Labs   Lab Test 12/14/19  0927 09/27/18  1022  09/26/15  0858 06/15/15  0902   CHOL 172 261*   < > 160 202*   HDL 54 56   < > 56 48*   * 189*   < > 93 140*   TRIG 83 79   < > 54 68   CHOLHDLRATIO  --   --   --  2.9 4.2    < > = values in this interval not displayed.     GERD - omeprazole    Minimal breakthrough    Depression - prozac    PHQ-9 SCORE 6/18/2018 9/18/2018 12/14/2019   PHQ-9 Total Score - - -   PHQ-9 Total Score 2 1 2     KRISTI-7 SCORE 6/18/2018 9/18/2018 12/14/2019   Total Score - - -   Total Score 1 0 1     Reviewed and updated as needed this visit by Provider    BP Readings from Last 3 Encounters:   01/24/20 128/76   12/14/19 132/80   09/18/18 126/82     body mass index is 34.01 kg/m .    Wt Readings from Last 4 Encounters:   01/24/20 87.1 kg (192 lb)   12/14/19 87.1 kg (192 lb)   09/18/18 87.5 kg (193 lb)   06/18/18 87.1 kg (192 lb)       Health Maintenance    Health Maintenance Due   Topic Date Due     PAP  12/30/2016     ZOSTER IMMUNIZATION (3 of 3) 10/01/2018     INFLUENZA VACCINE (1) 09/01/2019     PREVENTIVE CARE VISIT  09/18/2019     MAMMO SCREENING  09/27/2019       Current Problem List    Patient Active Problem  List   Diagnosis     Rosacea     Ulcerative colitis (H)     Major depressive disorder, single episode, moderate (H)     Osteopenia     Hyperlipidemia LDL goal <130     Health Care Home     Advanced directives, counseling/discussion     OA (osteoarthritis)     Family history of colon cancer     GERD (gastroesophageal reflux disease)     Colon polyp     Ulcerative rectosigmoiditis without complication (H)     Glaucoma       Past Medical History    Past Medical History:   Diagnosis Date     Colon polyp      Family history of colon cancer     Mom     GERD (gastroesophageal reflux disease) 2012     Glaucoma     [facility] - bilateral     Major depressive disorder, single episode, moderate (H) 5/09     OA (osteoarthritis) 2004    Lt hip moderate     Osteopenia 7/09     Rosacea 1998     Ulcerative colitis, unspecified 1985    UC - rare issues     Unspecified hemorrhoids without mention of complication 4/07       Past Surgical History    Past Surgical History:   Procedure Laterality Date     COLONOSCOPY  8/16/2012     HC COLONOSCOPY THRU STOMA, DIAGNOSTIC  4/07, 2012, 12/16    colon polyp, diverticulosis, hemorrhoids - due 5-10 Yrs     HC REVISE MEDIAN N/CARPAL TUNNEL SURG  12/02    bilateral - dr acuna     SURGICAL HISTORY OF -   6/07    Dr Perales - Lt hip CARMELINA     SURGICAL HISTORY OF -   6/09    Dr Perales - Rt Hip CARMELINA       Current Medications    Current Outpatient Medications   Medication Sig Dispense Refill     azithromycin (ZITHROMAX) 250 MG tablet 2 tabs day 1 and the 1 tab daily for 4 more days 6 tablet 0     FLUoxetine (PROZAC) 20 MG capsule TAKE THREE CAPSULES BY MOUTH EVERY DAY 90 capsule 1     metroNIDAZOLE (METROGEL) 0.75 % topical gel Apply topically 2 times daily 135 g 3     nitroFURantoin macrocrystal (MACRODANTIN) 50 MG capsule TAKE ONE CAPSULE BY MOUTH ONCE DAILY FOR 3 MONTHS FOR UTI PREVENTION 60 capsule 0     omeprazole (PRILOSEC) 20 MG DR capsule Take 1 capsule (20 mg) by mouth daily 90  capsule 1     rosuvastatin (CRESTOR) 10 MG tablet TAKE ONE TABLET BY MOUTH ONCE DAILY 90 tablet 0       Allergies    Allergies   Allergen Reactions     Sulfa Drugs      Rash       Immunizations    Immunization History   Administered Date(s) Administered     Pneumococcal 23 valent 10/26/2016     TD (ADULT, 7+) 2002     TDAP Vaccine (Boostrix) 10/24/2011     Zoster vaccine recombinant adjuvanted (SHINGRIX) 2018, 2018     Zoster vaccine, live 10/26/2016, 2018       Family History    Family History   Problem Relation Age of Onset     Lung Cancer Father          Lung CA - SMOKER     Lipids Sister      Lipids Brother      Cerebrovascular Disease Maternal Grandmother      C.A.D. Maternal Grandfather      Prostate Cancer Maternal Grandfather      Alzheimer Disease Paternal Grandmother      Prostate Cancer Paternal Grandfather      Eye Disorder Mother         glaucoma     Cancer - colorectal Mother         AGE 71 WITH METS liver cancer.  Oct. 2006     Eye Disorder Sister         histoplasmosis     Hyperlipidemia Sister      Eye Disorder Brother         histoplasmosis     Hyperlipidemia Brother      Hyperlipidemia Brother      Hyperlipidemia Sister        Social History    Social History     Socioeconomic History     Marital status:      Spouse name: Compa     Number of children: 5     Years of education: 12     Highest education level: Not on file   Occupational History     Occupation:      Employer: NONE    Social Needs     Financial resource strain: Not on file     Food insecurity:     Worry: Not on file     Inability: Not on file     Transportation needs:     Medical: Not on file     Non-medical: Not on file   Tobacco Use     Smoking status: Never Smoker     Smokeless tobacco: Never Used   Substance and Sexual Activity     Alcohol use: Yes     Alcohol/week: 1.7 standard drinks     Types: 2 Standard drinks or equivalent per week     Comment: 2 glass of wine per week     Drug use:  No     Sexual activity: Yes     Partners: Male   Lifestyle     Physical activity:     Days per week: Not on file     Minutes per session: Not on file     Stress: Not on file   Relationships     Social connections:     Talks on phone: Not on file     Gets together: Not on file     Attends Taoist service: Not on file     Active member of club or organization: Not on file     Attends meetings of clubs or organizations: Not on file     Relationship status: Not on file     Intimate partner violence:     Fear of current or ex partner: Not on file     Emotionally abused: Not on file     Physically abused: Not on file     Forced sexual activity: Not on file   Other Topics Concern      Service Not Asked     Blood Transfusions Not Asked     Caffeine Concern Yes     Comment: 2 cups daily     Occupational Exposure Not Asked     Hobby Hazards Not Asked     Sleep Concern Not Asked     Stress Concern Not Asked     Weight Concern Not Asked     Special Diet Not Asked     Back Care Not Asked     Exercise Yes     Comment: 3+/week     Bike Helmet Not Asked     Seat Belt Yes     Self-Exams Not Asked     Parent/sibling w/ CABG, MI or angioplasty before 65F 55M? No   Social History Narrative     Not on file       All above reviewed and updated, all stable unless otherwise noted    Recent labs reviewed    ROS    CONSTITUTIONAL: NEGATIVE for fever, chills, change in weight  INTEGUMENTARY/SKIN: NEGATIVE for worrisome rashes, moles or lesions  EYES: NEGATIVE for vision changes or irritation  ENT/MOUTH: NEGATIVE for ear, mouth and throat problems  RESP: NEGATIVE for significant cough or SOB  CV: NEGATIVE for chest pain, palpitations or peripheral edema  GI: NEGATIVE for nausea, abdominal pain, heartburn, or change in bowel habits  : NEGATIVE for frequency, dysuria, or hematuria  MUSCULOSKELETAL: NEGATIVE for significant arthralgias or myalgia  NEURO: NEGATIVE for weakness, dizziness or paresthesias  ENDOCRINE: NEGATIVE for  "temperature intolerance, skin/hair changes  HEME: NEGATIVE for bleeding problems  PSYCHIATRIC: NEGATIVE for changes in mood or affect    OBJECTIVE    /76   Pulse 97   Temp 99  F (37.2  C) (Oral)   Ht 1.6 m (5' 3\")   Wt 87.1 kg (192 lb)   LMP 10/20/2003   SpO2 98%   BMI 34.01 kg/m    Body mass index is 34.01 kg/m .  GENERAL: healthy, alert and no distress  EYES: Eyes grossly normal to inspection, extraocular movements - intact, and PERRL  HENT: ear canals- normal; TMs- normal; Nose- normal; Mouth- no ulcers, no lesions  NECK: no tenderness, no adenopathy, no asymmetry, no masses, no stiffness; thyroid- normal to palpation  RESP: lungs clear to auscultation - no rales, no rhonchi, no wheezes  CV: regular rates and rhythm, normal S1 S2, no S3 or S4 and no murmur, no click or rub -  ABDOMEN: soft, no tenderness, no  hepatosplenomegaly, no masses, normal bowel sounds  MS: extremities- no gross deformities noted, no edema  SKIN: no suspicious lesions, no rashes  NEURO: strength and tone- normal, sensory exam- grossly normal, mentation- intact, speech- normal, reflexes- symmetric  BACK: no CVA tenderness, no paralumbar tenderness  PSYCH: Alert and oriented times 3; speech- coherent , normal rate and volume; able to articulate logical thoughts, able to abstract reason, no tangential thoughts, no hallucinations or delusions, affect- normal  LYMPHATICS: no cervical adenopathy    DIAGNOSTICS/PROCEDURE    Strep negative  Influenza negative     ASSESSMENT      ICD-10-CM    1. Acute URI J06.9 Rapid strep screen     Influenza A/B antigen     Beta strep group A culture     azithromycin (ZITHROMAX) 250 MG tablet   2. Acute non-recurrent sinusitis, unspecified location J01.90 Rapid strep screen     Influenza A/B antigen     Beta strep group A culture     azithromycin (ZITHROMAX) 250 MG tablet   3. Fever, unspecified fever cause R50.9 Rapid strep screen     Influenza A/B antigen     Beta strep group A culture "       PLAN    Discussed treatment/modality options, including risk and benefits she desires:     1) Mucinex DM    2) sx cares    3) zithromax, if not improving in next few days    All diagnosis above reviewed and noted above, otherwise stable.  See [facility] orders for further details.     Return in about 1 week (around 1/31/2020), or if symptoms worsen or fail to improve, for Follow Up Acute.    Health Maintenance Due   Topic Date Due     PAP  12/30/2016     ZOSTER IMMUNIZATION (3 of 3) 10/01/2018     INFLUENZA VACCINE (1) 09/01/2019     PREVENTIVE CARE VISIT  09/18/2019     MAMMO SCREENING  09/27/2019       See Patient Instructions    Return in about 1 week (around 1/31/2020), or if symptoms worsen or fail to improve, for Follow Up Acute.           Louis Nixon MD, FAAFP     [facility]  [address] [place] [id number]  [email or web address]   Office: [phone]  Fax: [phone]  Pager: [phone]

## 2020-01-25 LAB
BACTERIA SPEC CULT: NORMAL
SPECIMEN SOURCE: NORMAL

## 2020-02-07 NOTE — TELEPHONE ENCOUNTER
"Requested Prescriptions   Pending Prescriptions Disp Refills     nitroFURantoin macrocrystal (MACRODANTIN) 50 MG capsule [Pharmacy Med Name: NITROFURANTOIN MACROCRYSTAL 50 CAPS]        Last Written Prescription Date:  10.31.19  Last Fill Quantity: 60 capsule,  # refills: 0   Last office visit: 12/14/2019 with prescribing provider:  Louis Nixon MD           Future Office Visit:       60 capsule 0     Sig: TAKE ONE CAPSULE BY MOUTH ONCE DAILY FOR 3 MONTHS FOR UTI PREVENTION       There is no refill protocol information for this order        rosuvastatin (CRESTOR) 10 MG tablet [Pharmacy Med Name: ROSUVASTATIN CALCIUM 10MG TABS]          Last Written Prescription Date:  12.14.19  Last Fill Quantity: 60 tablet,  # refills: 1   Last office visit: 12/14/2019 with prescribing provider:  Louis Nixon MD           Future Office Visit:       60 tablet 0     Sig: TAKE ONE TABLET BY MOUTH ONCE DAILY       Statins Protocol Passed - 1/16/2020 10:38 PM        Passed - LDL on file in past 12 months     Recent Labs   Lab Test 12/14/19  0927   *             Passed - No abnormal creatine kinase in past 12 months     Recent Labs   Lab Test 12/14/19  0927   CKT 70                Passed - Recent (12 mo) or future (30 days) visit within the authorizing provider's specialty     Patient has had an office visit with the authorizing provider or a provider within the authorizing providers department within the previous 12 mos or has a future within next 30 days. See \"Patient Info\" tab in inbasket, or \"Choose Columns\" in Meds & Orders section of the refill encounter.              Passed - Medication is active on med list        Passed - Patient is age 18 or older        Passed - No active pregnancy on record        Passed - No positive pregnancy test in past 12 months        " other

## 2020-03-04 DIAGNOSIS — F32.1 MAJOR DEPRESSIVE DISORDER, SINGLE EPISODE, MODERATE (H): ICD-10-CM

## 2020-03-05 NOTE — TELEPHONE ENCOUNTER
"Requested Prescriptions   Pending Prescriptions Disp Refills     FLUoxetine (PROZAC) 20 MG capsule [Pharmacy Med Name: FLUOXETINE HCL 20MG CAPS]      Last Written Prescription Date:  12.14.19  Last Fill Quantity: 90 capsule,  # refills: 1   Last office visit: 1/24/2020 with prescribing provider:  Louis Nixon MD           Future Office Visit:       90 capsule 1     Sig: TAKE THREE CAPSULES BY MOUTH EVERY DAY       SSRIs Protocol Passed - 3/5/2020  6:46 AM        Passed - PHQ-9 score less than 5 in past 6 months     Please review last PHQ-9 score.       PHQ-9 SCORE 6/18/2018 9/18/2018 12/14/2019   PHQ-9 Total Score - - -   PHQ-9 Total Score 2 1 2     KRISTI-7 SCORE 6/18/2018 9/18/2018 12/14/2019   Total Score - - -   Total Score 1 0 1                   Passed - Medication is active on med list        Passed - Patient is age 18 or older        Passed - No active pregnancy on record        Passed - No positive pregnancy test in last 12 months        Passed - Recent (6 mo) or future (30 days) visit within the authorizing provider's specialty     Patient had office visit in the last 6 months or has a visit in the next 30 days with authorizing provider or within the authorizing provider's specialty.  See \"Patient Info\" tab in inbasket, or \"Choose Columns\" in Meds & Orders section of the refill encounter.            "

## 2020-03-07 NOTE — TELEPHONE ENCOUNTER
Prescription approved per [facility] Refill Protocol.  30 day supply given.  Due for fasting physical.      FRANKLIN Jeffries, RN, PHN  [facility]  Office: [phone]  Fax: [phone]

## 2020-06-03 DIAGNOSIS — E78.5 HYPERLIPIDEMIA LDL GOAL <130: ICD-10-CM

## 2020-06-03 RX ORDER — ROSUVASTATIN CALCIUM 10 MG/1
TABLET, COATED ORAL
Qty: 90 TABLET | Refills: 1 | Status: SHIPPED | OUTPATIENT
Start: 2020-06-03 | End: 2020-07-29

## 2020-06-06 DIAGNOSIS — N30.90 RECURRENT CYSTITIS: ICD-10-CM

## 2020-06-06 DIAGNOSIS — Z87.440 PERSONAL HISTORY OF URINARY TRACT INFECTION: ICD-10-CM

## 2020-06-06 NOTE — LETTER
[address] [address]  [phone]       June 25, 2020    Kristin Jimenez  [address] [place] [id number]    Jahaira :  We have been calling you regarding a recent refill request we received for Macrodantin.  Unfortunately, we were unable to reach you.  We are notifying you that you are due for a med check by phone or video visit or in clinic visit prior to your next refill.  You can schedule this appointment via [employer] or by calling the clinic at [phone].    Sincerely,        Louis Nixon M.D./jacky

## 2020-06-07 RX ORDER — NITROFURANTOIN MACROCRYSTALS 50 MG/1
CAPSULE ORAL
Qty: 90 CAPSULE | Refills: 0 | Status: SHIPPED | OUTPATIENT
Start: 2020-06-07 | End: 2020-07-29

## 2020-06-19 DIAGNOSIS — F32.1 MAJOR DEPRESSIVE DISORDER, SINGLE EPISODE, MODERATE (H): ICD-10-CM

## 2020-06-19 NOTE — TELEPHONE ENCOUNTER
Routing refill request to provider for review/approval because:  Labs not current:  phq9  Sent patient a my chart message to complete phq9.    FRANKLIN MarshallN, RN  [employer] [facility]

## 2020-06-20 NOTE — TELEPHONE ENCOUNTER
RX done - limited    Advise phone/video visit follow up soon, PHQ/KRISIT    Fasting labs soon    CPX when able, last 2018

## 2020-06-23 ENCOUNTER — E-VISIT (OUTPATIENT)
Dept: FAMILY MEDICINE | Facility: CLINIC | Age: 65
End: 2020-06-23
Payer: COMMERCIAL

## 2020-06-23 DIAGNOSIS — Z53.9 ERRONEOUS ENCOUNTER--DISREGARD: Primary | ICD-10-CM

## 2020-06-23 ASSESSMENT — ANXIETY QUESTIONNAIRES
GAD7 TOTAL SCORE: 1
6. BECOMING EASILY ANNOYED OR IRRITABLE: NOT AT ALL
1. FEELING NERVOUS, ANXIOUS, OR ON EDGE: SEVERAL DAYS
GAD7 TOTAL SCORE: 1
7. FEELING AFRAID AS IF SOMETHING AWFUL MIGHT HAPPEN: NOT AT ALL
GAD7 TOTAL SCORE: 1
4. TROUBLE RELAXING: NOT AT ALL
2. NOT BEING ABLE TO STOP OR CONTROL WORRYING: NOT AT ALL
3. WORRYING TOO MUCH ABOUT DIFFERENT THINGS: NOT AT ALL
5. BEING SO RESTLESS THAT IT IS HARD TO SIT STILL: NOT AT ALL
7. FEELING AFRAID AS IF SOMETHING AWFUL MIGHT HAPPEN: NOT AT ALL

## 2020-06-23 ASSESSMENT — PATIENT HEALTH QUESTIONNAIRE - PHQ9
SUM OF ALL RESPONSES TO PHQ QUESTIONS 1-9: 0
10. IF YOU CHECKED OFF ANY PROBLEMS, HOW DIFFICULT HAVE THESE PROBLEMS MADE IT FOR YOU TO DO YOUR WORK, TAKE CARE OF THINGS AT HOME, OR GET ALONG WITH OTHER PEOPLE: NOT DIFFICULT AT ALL
SUM OF ALL RESPONSES TO PHQ QUESTIONS 1-9: 0

## 2020-06-23 NOTE — TELEPHONE ENCOUNTER
Provider E-Visit time total (minutes): 0-5    PHQ 9/18/2018 12/14/2019 6/23/2020   PHQ-9 Total Score 1 2 0   Q9: Thoughts of better off dead/self-harm past 2 weeks Not at all Not at all Not at all     KRISTI-7 SCORE 9/18/2018 12/14/2019 6/23/2020   Total Score - - -   Total Score - - 1 (minimal anxiety)   Total Score 0 1 1       Please advise phone/video visit

## 2020-06-24 ASSESSMENT — PATIENT HEALTH QUESTIONNAIRE - PHQ9: SUM OF ALL RESPONSES TO PHQ QUESTIONS 1-9: 0

## 2020-06-24 ASSESSMENT — ANXIETY QUESTIONNAIRES: GAD7 TOTAL SCORE: 1

## 2020-07-02 DIAGNOSIS — K21.9 GASTROESOPHAGEAL REFLUX DISEASE WITHOUT ESOPHAGITIS: ICD-10-CM

## 2020-07-29 ENCOUNTER — VIRTUAL VISIT (OUTPATIENT)
Dept: FAMILY MEDICINE | Facility: CLINIC | Age: 65
End: 2020-07-29
Payer: COMMERCIAL

## 2020-07-29 DIAGNOSIS — L71.9 ROSACEA: ICD-10-CM

## 2020-07-29 DIAGNOSIS — E78.5 HYPERLIPIDEMIA LDL GOAL <130: ICD-10-CM

## 2020-07-29 DIAGNOSIS — K21.9 GASTROESOPHAGEAL REFLUX DISEASE WITHOUT ESOPHAGITIS: ICD-10-CM

## 2020-07-29 DIAGNOSIS — F32.1 MAJOR DEPRESSIVE DISORDER, SINGLE EPISODE, MODERATE (H): Primary | ICD-10-CM

## 2020-07-29 DIAGNOSIS — M15.0 PRIMARY OSTEOARTHRITIS INVOLVING MULTIPLE JOINTS: ICD-10-CM

## 2020-07-29 DIAGNOSIS — K51.919 ULCERATIVE COLITIS WITH COMPLICATION, UNSPECIFIED LOCATION (H): ICD-10-CM

## 2020-07-29 DIAGNOSIS — Z87.440 PERSONAL HISTORY OF URINARY TRACT INFECTION: ICD-10-CM

## 2020-07-29 DIAGNOSIS — M85.89 OSTEOPENIA OF MULTIPLE SITES: ICD-10-CM

## 2020-07-29 DIAGNOSIS — Z51.81 MEDICATION MONITORING ENCOUNTER: ICD-10-CM

## 2020-07-29 PROCEDURE — 99214 OFFICE O/P EST MOD 30 MIN: CPT | Mod: TEL | Performed by: FAMILY MEDICINE

## 2020-07-29 RX ORDER — METRONIDAZOLE 7.5 MG/G
GEL TOPICAL 2 TIMES DAILY
Qty: 135 G | Refills: 1 | Status: SHIPPED | OUTPATIENT
Start: 2020-07-29 | End: 2020-10-26

## 2020-07-29 RX ORDER — NITROFURANTOIN MACROCRYSTALS 50 MG/1
50 CAPSULE ORAL AT BEDTIME
Qty: 90 CAPSULE | Refills: 1 | Status: SHIPPED | OUTPATIENT
Start: 2020-07-29 | End: 2020-10-26

## 2020-07-29 RX ORDER — ROSUVASTATIN CALCIUM 10 MG/1
10 TABLET, COATED ORAL DAILY
Qty: 90 TABLET | Refills: 1 | Status: SHIPPED | OUTPATIENT
Start: 2020-07-29 | End: 2020-10-26

## 2020-07-29 ASSESSMENT — PATIENT HEALTH QUESTIONNAIRE - PHQ9
SUM OF ALL RESPONSES TO PHQ QUESTIONS 1-9: 0
5. POOR APPETITE OR OVEREATING: NOT AT ALL

## 2020-07-29 ASSESSMENT — ANXIETY QUESTIONNAIRES
5. BEING SO RESTLESS THAT IT IS HARD TO SIT STILL: NOT AT ALL
6. BECOMING EASILY ANNOYED OR IRRITABLE: NOT AT ALL
7. FEELING AFRAID AS IF SOMETHING AWFUL MIGHT HAPPEN: NOT AT ALL
3. WORRYING TOO MUCH ABOUT DIFFERENT THINGS: NOT AT ALL
1. FEELING NERVOUS, ANXIOUS, OR ON EDGE: NOT AT ALL
GAD7 TOTAL SCORE: 0
IF YOU CHECKED OFF ANY PROBLEMS ON THIS QUESTIONNAIRE, HOW DIFFICULT HAVE THESE PROBLEMS MADE IT FOR YOU TO DO YOUR WORK, TAKE CARE OF THINGS AT HOME, OR GET ALONG WITH OTHER PEOPLE: NOT DIFFICULT AT ALL
2. NOT BEING ABLE TO STOP OR CONTROL WORRYING: NOT AT ALL

## 2020-07-29 NOTE — PROGRESS NOTES
[facility] - [place]    Telephone visit  - [phone]    Subjective    Kristin Jimenez is a 65 year old female who is being evaluated via a billable telephone visit.      Chief Complaint   Patient presents with     Depression     Last 9/2018    Depression Follow-Up - on prozac     How are you doing with your depression since your last visit? No change    Are you having other symptoms that might be associated with depression or anxiety? No    Have you had a significant life event? OTHER:  starting up again     Do you have any concerns with your use of alcohol or other drugs? No    Social History     Tobacco Use     Smoking status: Never Smoker     Smokeless tobacco: Never Used   Substance Use Topics     Alcohol use: Yes     Alcohol/week: 1.7 standard drinks     Types: 2 Standard drinks or equivalent per week     Comment: 2 glass of wine per week     Drug use: No     PHQ 12/14/2019 6/23/2020 7/29/2020   PHQ-9 Total Score 2 0 0   Q9: Thoughts of better off dead/self-harm past 2 weeks Not at all Not at all Not at all     KRISTI-7 SCORE 12/14/2019 6/23/2020 7/29/2020   Total Score - - -   Total Score - 1 (minimal anxiety) -   Total Score 1 1 0     Suicide Assessment Five-step Evaluation and Treatment (SAFE-T)      How many servings of fruits and vegetables do you eat daily?  4 or more    On average, how many sweetened beverages do you drink each day (Examples: soda, juice, sweet tea, etc.  Do NOT count diet or artificially sweetened beverages)?   0    How many days per week do you exercise enough to make your heart beat faster? 3 or less    How many minutes a day do you exercise enough to make your heart beat faster? 9 or less    How many days per week do you miss taking your medication? 0    Lipids - on rosuvastain    Recent Labs   Lab Test 12/14/19  0927 09/27/18  1022  09/26/15  0858 06/15/15  0902   CHOL 172 261*   < > 160 202*   HDL 54 56   < > 56 48*   * 189*   < > 93 140*   TRIG 83 79    < > 54 68   CHOLHDLRATIO  --   --   --  2.9 4.2    < > = values in this interval not displayed.     UC - no issues    Rosacea - well controlled - on rosacea    GERD - well controlled on omeprazole    Recurrent UTI - on macrobid at HS    Due for CPX, fasting labs, mammogram, DEXA, colonoscopy in     PMH    Past Medical History:   Diagnosis Date     Colon polyp      Family history of colon cancer     Mom     GERD (gastroesophageal reflux disease)      Glaucoma     [facility] - bilateral     Major depressive disorder, single episode, moderate (H)      OA (osteoarthritis)     Lt hip moderate     Osteopenia      Rosacea      Ulcerative colitis, unspecified     UC - rare issues     Unspecified hemorrhoids without mention of complication        PSH    Past Surgical History:   Procedure Laterality Date     COLONOSCOPY  2012     HC COLONOSCOPY THRU STOMA, DIAGNOSTIC  , ,     colon polyp, diverticulosis, hemorrhoids - due 5-10 Yrs     HC REVISE MEDIAN N/CARPAL TUNNEL SURG      bilateral - dr acuna     SURGICAL HISTORY OF -       Dr Perales - Lt hip CARMELINA     SURGICAL HISTORY OF -       Dr Perales - Rt Hip CARMELINA       Medications    Current Outpatient Medications   Medication Sig Dispense Refill     FLUoxetine (PROZAC) 20 MG capsule Take 3 capsules (60 mg) by mouth daily 270 capsule 1     metroNIDAZOLE (METROGEL) 0.75 % external gel Apply topically 2 times daily 135 g 1     nitroFURantoin macrocrystal (MACRODANTIN) 50 MG capsule Take 1 capsule (50 mg) by mouth At Bedtime 90 capsule 1     omeprazole (PRILOSEC) 20 MG DR capsule Take 1 capsule (20 mg) by mouth daily 90 capsule 1     rosuvastatin (CRESTOR) 10 MG tablet Take 1 tablet (10 mg) by mouth daily 90 tablet 1       Allergies    Sulfa drugs    Family History    Family History   Problem Relation Age of Onset     Lung Cancer Father          Lung CA - SMOKER     Lipids Sister      Lipids Brother       Cerebrovascular Disease Maternal Grandmother      C.A.D. Maternal Grandfather      Prostate Cancer Maternal Grandfather      Alzheimer Disease Paternal Grandmother      Prostate Cancer Paternal Grandfather      Eye Disorder Mother         glaucoma     Cancer - colorectal Mother         AGE 71 WITH METS liver cancer.  Oct. 2006     Eye Disorder Sister         histoplasmosis     Hyperlipidemia Sister      Eye Disorder Brother         histoplasmosis     Hyperlipidemia Brother      Hyperlipidemia Brother      Hyperlipidemia Sister        Social History    Social History     Socioeconomic History     Marital status:      Spouse name: Compa     Number of children: 5     Years of education: 12     Highest education level: Not on file   Occupational History     Occupation:      Employer: NONE    Social Needs     Financial resource strain: Not on file     Food insecurity     Worry: Not on file     Inability: Not on file     Transportation needs     Medical: Not on file     Non-medical: Not on file   Tobacco Use     Smoking status: Never Smoker     Smokeless tobacco: Never Used   Substance and Sexual Activity     Alcohol use: Yes     Alcohol/week: 1.7 standard drinks     Types: 2 Standard drinks or equivalent per week     Comment: 2 glass of wine per week     Drug use: No     Sexual activity: Yes     Partners: Male   Lifestyle     Physical activity     Days per week: Not on file     Minutes per session: Not on file     Stress: Not on file   Relationships     Social connections     Talks on phone: Not on file     Gets together: Not on file     Attends Cheondoism service: Not on file     Active member of club or organization: Not on file     Attends meetings of clubs or organizations: Not on file     Relationship status: Not on file     Intimate partner violence     Fear of current or ex partner: Not on file     Emotionally abused: Not on file     Physically abused: Not on file     Forced sexual activity: Not on  file   Other Topics Concern      Service Not Asked     Blood Transfusions Not Asked     Caffeine Concern Yes     Comment: 2 cups daily     Occupational Exposure Not Asked     Hobby Hazards Not Asked     Sleep Concern Not Asked     Stress Concern Not Asked     Weight Concern Not Asked     Special Diet Not Asked     Back Care Not Asked     Exercise Yes     Comment: 3+/week     Bike Helmet Not Asked     Seat Belt Yes     Self-Exams Not Asked     Parent/sibling w/ CABG, MI or angioplasty before 65F 55M? No   Social History Narrative     Not on file       Reviewed and updated as needed this visit by Provider           Review of Systems     CONSTITUTIONAL: NEGATIVE for fever, chills, change in weight  INTEGUMENTARY/SKIN: NEGATIVE for worrisome rashes, moles or lesions  EYES: NEGATIVE for vision changes or irritation  ENT/MOUTH: NEGATIVE for ear, mouth and throat problems  RESP: NEGATIVE for significant cough or SOB  CV: NEGATIVE for chest pain, palpitations or peripheral edema  GI: NEGATIVE for nausea, abdominal pain, heartburn, or change in bowel habits  : NEGATIVE for frequency, dysuria, or hematuria  MUSCULOSKELETAL: NEGATIVE for significant arthralgias or myalgia  NEURO: NEGATIVE for weakness, dizziness or paresthesias  ENDOCRINE: NEGATIVE for temperature intolerance, skin/hair changes  HEME: NEGATIVE for bleeding problems  PSYCHIATRIC: NEGATIVE for changes in mood or affect    Objective    Reported vitals:  LMP 10/20/2003      healthy, alert and no distress  Psych: Alert and oriented times 3; coherent speech, normal   rate and volume, able to articulate logical thoughts, able   to abstract reason, no tangential thoughts, no hallucinations   or delusions  Her affect is normal     Diagnostic Test Results:  Labs reviewed in Epic    Assessment/Plan:      ICD-10-CM    1. Major depressive disorder, single episode, moderate (H)  F32.1 TSH with free T4 reflex     FLUoxetine (PROZAC) 20 MG capsule   2. Ulcerative  "colitis with complication, unspecified location (H)  K51.919 Comprehensive metabolic panel     CBC with platelets   3. Primary osteoarthritis involving multiple joints  M89.49    4. Osteopenia of multiple sites  M85.89 Comprehensive metabolic panel     Vitamin D Deficiency   5. Gastroesophageal reflux disease without esophagitis  K21.9 CBC with platelets     omeprazole (PRILOSEC) 20 MG DR capsule   6. Hyperlipidemia LDL goal <130  E78.5 Comprehensive metabolic panel     Lipid panel reflex to direct LDL Fasting     CK total     rosuvastatin (CRESTOR) 10 MG tablet   7. Rosacea  L71.9 metroNIDAZOLE (METROGEL) 0.75 % external gel   8. Personal history of urinary tract infection  Z87.440 nitroFURantoin macrocrystal (MACRODANTIN) 50 MG capsule   9. Medication monitoring encounter  Z51.81 Comprehensive metabolic panel     Lipid panel reflex to direct LDL Fasting     CBC with platelets     CK total     TSH with free T4 reflex     UA reflex to Microscopic and Culture     Albumin Random Urine Quantitative with Creat Ratio     Fecal colorectal cancer screen FIT     MA Screening Digital Bilateral     DX Hip/Pelvis/Spine     Vitamin D Deficiency       Med refills  Labs fasting soon  CPX when able  Mammogram  DEXA    Return in about 4 months (around 11/29/2020), or if symptoms worsen or fail to improve, for Complete Physical, Follow Up Chronic, Medication Recheck Visit.    Phone call duration:  16 minutes    The patient has been notified of following:     \"This telephone visit will be conducted via a call between you and your physician/provider. We have found that certain health care needs can be provided without the need for a physical exam.  This service lets us provide the care you need with a short phone conversation.  If a prescription is necessary we can send it directly to your pharmacy.  If lab work is needed we can place an order for that and you can then stop by our lab to have the test done at a later " "time.    Telephone visits are billed at different rates depending on your insurance coverage. During this emergency period, for some insurers they may be billed the same as an in-person visit.  Please reach out to your insurance provider with any questions.    If during the course of the call the physician/provider feels a telephone visit is not appropriate, you will not be charged for this service.\"    Patient has given verbal consent for Telephone visit?  Yes    How would you like to obtain your AVS? Kan Nixon MD, FAAFP     [facility]  [address] [place] [address]  [email or web address]   Office: [phone]  Fax: [phone]  Pager: [phone]     "

## 2020-07-30 ASSESSMENT — ANXIETY QUESTIONNAIRES: GAD7 TOTAL SCORE: 0

## 2020-08-26 ENCOUNTER — HOSPITAL ENCOUNTER (OUTPATIENT)
Dept: MAMMOGRAPHY | Facility: CLINIC | Age: 65
Discharge: HOME | End: 2020-08-26
Attending: FAMILY MEDICINE | Admitting: FAMILY MEDICINE
Payer: COMMERCIAL

## 2020-08-26 DIAGNOSIS — Z51.81 MEDICATION MONITORING ENCOUNTER: ICD-10-CM

## 2020-08-26 PROCEDURE — 77067 SCR MAMMO BI INCL CAD: CPT

## 2020-10-14 DIAGNOSIS — K51.919 ULCERATIVE COLITIS WITH COMPLICATION, UNSPECIFIED LOCATION (H): ICD-10-CM

## 2020-10-14 DIAGNOSIS — M85.89 OSTEOPENIA OF MULTIPLE SITES: ICD-10-CM

## 2020-10-14 DIAGNOSIS — E78.5 HYPERLIPIDEMIA LDL GOAL <130: ICD-10-CM

## 2020-10-14 DIAGNOSIS — Z51.81 MEDICATION MONITORING ENCOUNTER: ICD-10-CM

## 2020-10-14 DIAGNOSIS — F32.1 MAJOR DEPRESSIVE DISORDER, SINGLE EPISODE, MODERATE (H): ICD-10-CM

## 2020-10-14 DIAGNOSIS — K21.9 GASTROESOPHAGEAL REFLUX DISEASE WITHOUT ESOPHAGITIS: ICD-10-CM

## 2020-10-14 LAB
ALBUMIN UR-MCNC: NEGATIVE MG/DL
APPEARANCE UR: CLEAR
BACTERIA #/AREA URNS HPF: ABNORMAL /HPF
BILIRUB UR QL STRIP: NEGATIVE
COLOR UR AUTO: YELLOW
ERYTHROCYTE [DISTWIDTH] IN BLOOD BY AUTOMATED COUNT: 12.5 % (ref 10–15)
GLUCOSE UR STRIP-MCNC: NEGATIVE MG/DL
HCT VFR BLD AUTO: 38.1 % (ref 35–47)
HGB BLD-MCNC: 12.5 G/DL (ref 11.7–15.7)
HGB UR QL STRIP: NEGATIVE
KETONES UR STRIP-MCNC: NEGATIVE MG/DL
LEUKOCYTE ESTERASE UR QL STRIP: ABNORMAL
MCH RBC QN AUTO: 30.7 PG (ref 26.5–33)
MCHC RBC AUTO-ENTMCNC: 32.8 G/DL (ref 31.5–36.5)
MCV RBC AUTO: 94 FL (ref 78–100)
NITRATE UR QL: NEGATIVE
NON-SQ EPI CELLS #/AREA URNS LPF: ABNORMAL /LPF
PH UR STRIP: 7 PH (ref 5–7)
PLATELET # BLD AUTO: 348 10E9/L (ref 150–450)
RBC # BLD AUTO: 4.07 10E12/L (ref 3.8–5.2)
RBC #/AREA URNS AUTO: ABNORMAL /HPF
SOURCE: ABNORMAL
SP GR UR STRIP: 1.01 (ref 1–1.03)
UROBILINOGEN UR STRIP-ACNC: 0.2 EU/DL (ref 0.2–1)
WBC # BLD AUTO: 5.7 10E9/L (ref 4–11)
WBC #/AREA URNS AUTO: ABNORMAL /HPF

## 2020-10-14 PROCEDURE — 84443 ASSAY THYROID STIM HORMONE: CPT | Performed by: FAMILY MEDICINE

## 2020-10-14 PROCEDURE — 36415 COLL VENOUS BLD VENIPUNCTURE: CPT | Performed by: FAMILY MEDICINE

## 2020-10-14 PROCEDURE — 82043 UR ALBUMIN QUANTITATIVE: CPT | Performed by: FAMILY MEDICINE

## 2020-10-14 PROCEDURE — 82550 ASSAY OF CK (CPK): CPT | Performed by: FAMILY MEDICINE

## 2020-10-14 PROCEDURE — 80053 COMPREHEN METABOLIC PANEL: CPT | Performed by: FAMILY MEDICINE

## 2020-10-14 PROCEDURE — 81001 URINALYSIS AUTO W/SCOPE: CPT | Performed by: FAMILY MEDICINE

## 2020-10-14 PROCEDURE — 80061 LIPID PANEL: CPT | Performed by: FAMILY MEDICINE

## 2020-10-14 PROCEDURE — 85027 COMPLETE CBC AUTOMATED: CPT | Performed by: FAMILY MEDICINE

## 2020-10-14 PROCEDURE — 82306 VITAMIN D 25 HYDROXY: CPT | Performed by: FAMILY MEDICINE

## 2020-10-15 LAB
ALBUMIN SERPL-MCNC: 3.8 G/DL (ref 3.4–5)
ALP SERPL-CCNC: 76 U/L (ref 40–150)
ALT SERPL W P-5'-P-CCNC: 29 U/L (ref 0–50)
ANION GAP SERPL CALCULATED.3IONS-SCNC: 6 MMOL/L (ref 3–14)
AST SERPL W P-5'-P-CCNC: 20 U/L (ref 0–45)
BILIRUB SERPL-MCNC: 0.6 MG/DL (ref 0.2–1.3)
BUN SERPL-MCNC: 11 MG/DL (ref 7–30)
CALCIUM SERPL-MCNC: 9.3 MG/DL (ref 8.5–10.1)
CHLORIDE SERPL-SCNC: 104 MMOL/L (ref 94–109)
CHOLEST SERPL-MCNC: 179 MG/DL
CK SERPL-CCNC: 75 U/L (ref 30–225)
CO2 SERPL-SCNC: 25 MMOL/L (ref 20–32)
CREAT SERPL-MCNC: 0.65 MG/DL (ref 0.52–1.04)
CREAT UR-MCNC: 21 MG/DL
DEPRECATED CALCIDIOL+CALCIFEROL SERPL-MC: 47 UG/L (ref 20–75)
GFR SERPL CREATININE-BSD FRML MDRD: >90 ML/MIN/{1.73_M2}
GLUCOSE SERPL-MCNC: 85 MG/DL (ref 70–99)
HDLC SERPL-MCNC: 57 MG/DL
LDLC SERPL CALC-MCNC: 106 MG/DL
MICROALBUMIN UR-MCNC: <5 MG/L
MICROALBUMIN/CREAT UR: NORMAL MG/G CR (ref 0–25)
NONHDLC SERPL-MCNC: 122 MG/DL
POTASSIUM SERPL-SCNC: 4.4 MMOL/L (ref 3.4–5.3)
PROT SERPL-MCNC: 8 G/DL (ref 6.8–8.8)
SODIUM SERPL-SCNC: 135 MMOL/L (ref 133–144)
TRIGL SERPL-MCNC: 82 MG/DL
TSH SERPL DL<=0.005 MIU/L-ACNC: 1.71 MU/L (ref 0.4–4)

## 2020-10-26 ENCOUNTER — OFFICE VISIT (OUTPATIENT)
Dept: FAMILY MEDICINE | Facility: CLINIC | Age: 65
End: 2020-10-26
Payer: COMMERCIAL

## 2020-10-26 VITALS
SYSTOLIC BLOOD PRESSURE: 118 MMHG | DIASTOLIC BLOOD PRESSURE: 72 MMHG | BODY MASS INDEX: 34.2 KG/M2 | OXYGEN SATURATION: 100 % | HEART RATE: 88 BPM | TEMPERATURE: 98 F | WEIGHT: 193 LBS | HEIGHT: 63 IN

## 2020-10-26 DIAGNOSIS — Z80.0 FAMILY HISTORY OF COLON CANCER: ICD-10-CM

## 2020-10-26 DIAGNOSIS — K63.5 POLYP OF COLON, UNSPECIFIED PART OF COLON, UNSPECIFIED TYPE: ICD-10-CM

## 2020-10-26 DIAGNOSIS — Z87.440 PERSONAL HISTORY OF URINARY TRACT INFECTION: ICD-10-CM

## 2020-10-26 DIAGNOSIS — E78.5 HYPERLIPIDEMIA LDL GOAL <130: ICD-10-CM

## 2020-10-26 DIAGNOSIS — M15.0 PRIMARY OSTEOARTHRITIS INVOLVING MULTIPLE JOINTS: ICD-10-CM

## 2020-10-26 DIAGNOSIS — Z12.5 SCREENING FOR PROSTATE CANCER: ICD-10-CM

## 2020-10-26 DIAGNOSIS — M85.89 OSTEOPENIA OF MULTIPLE SITES: ICD-10-CM

## 2020-10-26 DIAGNOSIS — K21.9 GASTROESOPHAGEAL REFLUX DISEASE WITHOUT ESOPHAGITIS: ICD-10-CM

## 2020-10-26 DIAGNOSIS — K51.919 ULCERATIVE COLITIS WITH COMPLICATION, UNSPECIFIED LOCATION (H): ICD-10-CM

## 2020-10-26 DIAGNOSIS — Z51.81 MEDICATION MONITORING ENCOUNTER: ICD-10-CM

## 2020-10-26 DIAGNOSIS — F32.1 MAJOR DEPRESSIVE DISORDER, SINGLE EPISODE, MODERATE (H): ICD-10-CM

## 2020-10-26 DIAGNOSIS — Z12.11 SCREEN FOR COLON CANCER: ICD-10-CM

## 2020-10-26 DIAGNOSIS — Z00.00 ROUTINE GENERAL MEDICAL EXAMINATION AT A HEALTH CARE FACILITY: Primary | ICD-10-CM

## 2020-10-26 DIAGNOSIS — L71.9 ROSACEA: ICD-10-CM

## 2020-10-26 PROCEDURE — 90471 IMMUNIZATION ADMIN: CPT | Performed by: FAMILY MEDICINE

## 2020-10-26 PROCEDURE — 99396 PREV VISIT EST AGE 40-64: CPT | Mod: 25 | Performed by: FAMILY MEDICINE

## 2020-10-26 PROCEDURE — 90682 RIV4 VACC RECOMBINANT DNA IM: CPT | Performed by: FAMILY MEDICINE

## 2020-10-26 RX ORDER — ROSUVASTATIN CALCIUM 10 MG/1
10 TABLET, COATED ORAL DAILY
Qty: 90 TABLET | Refills: 3 | Status: SHIPPED | OUTPATIENT
Start: 2020-10-26 | End: 2021-09-17

## 2020-10-26 RX ORDER — NITROFURANTOIN MACROCRYSTALS 50 MG/1
50 CAPSULE ORAL AT BEDTIME
Qty: 90 CAPSULE | Refills: 3 | Status: SHIPPED | OUTPATIENT
Start: 2020-10-26 | End: 2022-01-04

## 2020-10-26 RX ORDER — METRONIDAZOLE 7.5 MG/G
GEL TOPICAL 2 TIMES DAILY
Qty: 135 G | Refills: 3 | Status: SHIPPED | OUTPATIENT
Start: 2020-10-26 | End: 2022-02-08

## 2020-10-26 SDOH — HEALTH STABILITY: MENTAL HEALTH: HOW OFTEN DO YOU HAVE A DRINK CONTAINING ALCOHOL?: NOT ASKED

## 2020-10-26 SDOH — HEALTH STABILITY: MENTAL HEALTH: HOW OFTEN DO YOU HAVE 6 OR MORE DRINKS ON ONE OCCASION?: NOT ASKED

## 2020-10-26 SDOH — HEALTH STABILITY: MENTAL HEALTH: HOW MANY STANDARD DRINKS CONTAINING ALCOHOL DO YOU HAVE ON A TYPICAL DAY?: NOT ASKED

## 2020-10-26 ASSESSMENT — MIFFLIN-ST. JEOR: SCORE: 1394.57

## 2020-10-26 NOTE — PROGRESS NOTES
[facility]  [place]    SUBJECTIVE    CC: Kristin Jimenez is an 65 year old woman who presents for preventive health visit.     Patient has been advised of split billing requirements and indicates understanding: Yes  Healthy Habits:    Do you get at least three servings of calcium containing foods daily (dairy, green leafy vegetables, etc.)? yes    Amount of exercise or daily activities, outside of work: minimum    Problems taking medications regularly No    Medication side effects: No    Have you had an eye exam in the past two years? yes    Do you see a dentist twice per year? no    Do you have sleep apnea, excessive snoring or daytime drowsiness?no    Today's PHQ-2 Score:   PHQ-2 ( 1999 Pfizer) 10/26/2020 9/18/2018   Q1: Little interest or pleasure in doing things 0 0   Q2: Feeling down, depressed or hopeless 0 0   PHQ-2 Score 0 0       Abuse: Current or Past(Physical, Sexual or Emotional)- No  Do you feel safe in your environment? Yes        Social History     Tobacco Use     Smoking status: Never Smoker     Smokeless tobacco: Never Used   Substance Use Topics     Alcohol use: Yes     Alcohol/week: 1.0 standard drinks     Types: 1 Standard drinks or equivalent per week     Comment: 2 glass of wine per week     If you drink alcohol do you typically have >3 drinks per day or >7 drinks per week? No                     Reviewed orders with patient.  Reviewed health maintenance and updated orders accordingly - Yes    Depression - PHQ/KRISTI - 0 & 0    Lipids    Recent Labs   Lab Test 10/14/20  0955 12/14/19  0927 09/26/15  0858 09/26/15  0858 06/15/15  0902   CHOL 179 172   < > 160 202*   HDL 57 54   < > 56 48*   * 101*   < > 93 140*   TRIG 82 83   < > 54 68   CHOLHDLRATIO  --   --   --  2.9 4.2    < > = values in this interval not displayed.     GERD - omeprazole controls    UC - no issues    Osteopenia - DEXA pending    OA - no issues    Rosacea - cream controls    Health  Maintenance     Colonoscopy:  Due 12/21   FIT:  given              Mammogram:  Due 8/21              PAP:  declines   DEXA:  Due 7/21    Health Maintenance Due   Topic Date Due     PAP  12/30/2016     ZOSTER IMMUNIZATION (3 of 3) 10/01/2018       Current Problem List    Patient Active Problem List   Diagnosis     Rosacea     Ulcerative colitis (H)     Major depressive disorder, single episode, moderate (H)     Osteopenia     Hyperlipidemia LDL goal <130     Health Care Home     Advanced directives, counseling/discussion     OA (osteoarthritis)     Family history of colon cancer     GERD (gastroesophageal reflux disease)     Colon polyp     Ulcerative rectosigmoiditis without complication (H)     Glaucoma       Past Medical History    Past Medical History:   Diagnosis Date     Colon polyp      Family history of colon cancer     Mom     GERD (gastroesophageal reflux disease) 2012     Glaucoma     [facility] - bilateral     Hyperlipidemia LDL goal <130      Major depressive disorder, single episode, moderate (H) 05/2009     OA (osteoarthritis) 2004    Lt hip moderate     Osteopenia 07/2009     Rosacea 1998     Ulcerative colitis, unspecified 1985    UC - rare issues     Unspecified hemorrhoids without mention of complication 04/2007       Past Surgical History    Past Surgical History:   Procedure Laterality Date     COLONOSCOPY  08/16/2012    due 5 yrs - 4 mm polyp - FH - diverticulosis     HC COLONOSCOPY THRU STOMA, DIAGNOSTIC  04/07/2012    colon polyp, diverticulosis, hemorrhoids - due 5-10 Yrs     HC REVISE MEDIAN N/CARPAL TUNNEL SURG  12/2002    bilateral - dr acuna     SURGICAL HISTORY OF -   06/2007    Dr Perales - Lt hip CARMELINA     SURGICAL HISTORY OF -   06/2009    Dr Perales - Rt Hip CARMELINA       Current Medications    Current Outpatient Medications   Medication Sig Dispense Refill     FLUoxetine (PROZAC) 20 MG capsule Take 3 capsules (60 mg) by mouth daily 270 capsule 3     metroNIDAZOLE (METROGEL)  0.75 % external gel Apply topically 2 times daily 135 g 3     nitroFURantoin macrocrystal (MACRODANTIN) 50 MG capsule Take 1 capsule (50 mg) by mouth At Bedtime 90 capsule 3     omeprazole (PRILOSEC) 20 MG DR capsule Take 1 capsule (20 mg) by mouth daily 90 capsule 3     rosuvastatin (CRESTOR) 10 MG tablet Take 1 tablet (10 mg) by mouth daily 90 tablet 3       Allergies    Allergies   Allergen Reactions     Sulfa Drugs      Rash       Immunizations    Immunization History   Administered Date(s) Administered     Influenza Quad, Recombinant, p-free (RIV4) 10/26/2020     Pneumococcal 23 valent 10/26/2016     TD (ADULT, 7+) 2002     TDAP Vaccine (Boostrix) 10/24/2011     Zoster vaccine recombinant adjuvanted (SHINGRIX) 2018, 2018     Zoster vaccine, live 10/26/2016, 2018       Family History    Family History   Problem Relation Age of Onset     Lung Cancer Father          Lung CA - SMOKER     Lipids Sister      Lipids Brother      Macular Degeneration Brother      Cerebrovascular Disease Maternal Grandmother      C.A.D. Maternal Grandfather      Prostate Cancer Maternal Grandfather      Alzheimer Disease Paternal Grandmother      Prostate Cancer Paternal Grandfather      Eye Disorder Mother         glaucoma     Cancer - colorectal Mother         AGE 71 WITH METS liver cancer.  Oct. 2006     Eye Disorder Sister         histoplasmosis     Hyperlipidemia Sister      Eye Disorder Brother         histoplasmosis     Hyperlipidemia Brother      Hyperlipidemia Brother      Hyperlipidemia Sister        Social History    Social History     Socioeconomic History     Marital status:      Spouse name: Compa     Number of children: 5     Years of education: 12     Highest education level: Not on file   Occupational History     Occupation:      Employer: NONE    Social Needs     Financial resource strain: Not on file     Food insecurity     Worry: Not on file     Inability: Not on file      Transportation needs     Medical: Not on file     Non-medical: Not on file   Tobacco Use     Smoking status: Never Smoker     Smokeless tobacco: Never Used   Substance and Sexual Activity     Alcohol use: Yes     Alcohol/week: 1.0 standard drinks     Types: 1 Standard drinks or equivalent per week     Comment: 2 glass of wine per week     Drug use: No     Sexual activity: Yes     Partners: Male   Lifestyle     Physical activity     Days per week: Not on file     Minutes per session: Not on file     Stress: Not on file   Relationships     Social connections     Talks on phone: Not on file     Gets together: Not on file     Attends Sabianist service: Not on file     Active member of club or organization: Not on file     Attends meetings of clubs or organizations: Not on file     Relationship status: Not on file     Intimate partner violence     Fear of current or ex partner: Not on file     Emotionally abused: Not on file     Physically abused: Not on file     Forced sexual activity: Not on file   Other Topics Concern      Service Not Asked     Blood Transfusions Not Asked     Caffeine Concern Yes     Comment: 2 cups daily     Occupational Exposure Not Asked     Hobby Hazards Not Asked     Sleep Concern Not Asked     Stress Concern Not Asked     Weight Concern Not Asked     Special Diet Not Asked     Back Care Not Asked     Exercise Yes     Comment: 3+/week     Bike Helmet Not Asked     Seat Belt Yes     Self-Exams Not Asked     Parent/sibling w/ CABG, MI or angioplasty before 65F 55M? No   Social History Narrative     Not on file       ROS    CONSTITUTIONAL: NEGATIVE for fever, chills, change in weight  INTEGUMENTARY/SKIN: NEGATIVE for worrisome rashes, moles or lesions  EYES: NEGATIVE for vision changes or irritation  ENT/MOUTH: NEGATIVE for ear, mouth and throat problems  RESP: NEGATIVE for significant cough or SOB  CV: NEGATIVE for chest pain, palpitations or peripheral edema  GI: NEGATIVE for nausea,  "abdominal pain, heartburn, or change in bowel habits  : NEGATIVE for frequency, dysuria, or hematuria  MUSCULOSKELETAL: NEGATIVE for significant arthralgias or myalgia  NEURO: NEGATIVE for weakness, dizziness or paresthesias  ENDOCRINE: NEGATIVE for temperature intolerance, skin/hair changes  HEME: NEGATIVE for bleeding problems  PSYCHIATRIC: NEGATIVE for changes in mood or affect    OBJECTIVE    /72   Pulse 88   Temp 98  F (36.7  C) (Tympanic)   Ht 1.6 m (5' 3\")   Wt 87.5 kg (193 lb)   LMP 10/20/2003   SpO2 100%   BMI 34.19 kg/m    EXAM:  GENERAL: healthy, alert and no distress  EYES: Eyes grossly normal to inspection, PERRL and conjunctivae and sclerae normal  HENT: ear canals and TM's normal, nose and mouth without ulcers or lesions  NECK: no adenopathy, no asymmetry, masses, or scars and thyroid normal to palpation  RESP: lungs clear to auscultation - no rales, rhonchi or wheezes  BREAST: declines  CV: regular rate and rhythm, normal S1 S2, no S3 or S4, 1/6 systolic murmur, click or rub, no peripheral edema and peripheral pulses strong  ABDOMEN: soft, nontender, no hepatosplenomegaly, no masses and bowel sounds normal   (female): declines  MS: no gross musculoskeletal defects noted, no edema  SKIN: no suspicious lesions or rashes  NEURO: Normal strength and tone, mentation intact and speech normal  PSYCH: mentation appears normal, affect normal/bright  LYMPH: no cervical, supraclavicular, axillary, or inguinal adenopathy    DIAGNOSTICS/PROCEDURES    reviewed    ASSESSMENT      ICD-10-CM    1. Routine general medical examination at a health care facility  Z00.00    2. Major depressive disorder, single episode, moderate (H)  F32.1 FLUoxetine (PROZAC) 20 MG capsule   3. Hyperlipidemia LDL goal <130  E78.5 rosuvastatin (CRESTOR) 10 MG tablet   4. Ulcerative colitis with complication, unspecified location (H)  K51.919    5. Gastroesophageal reflux disease without esophagitis  K21.9 omeprazole " "(PRILOSEC) 20 MG DR capsule   6. Personal history of urinary tract infection  Z87.440 nitroFURantoin macrocrystal (MACRODANTIN) 50 MG capsule   7. Osteopenia of multiple sites  M85.89    8. Primary osteoarthritis involving multiple joints  M89.49    9. Rosacea  L71.9 metroNIDAZOLE (METROGEL) 0.75 % external gel   10. Polyp of colon, unspecified part of colon, unspecified type  K63.5    11. Family history of colon cancer  Z80.0    12. Screen for colon cancer  Z12.11    13. Screening for prostate cancer  Z12.5    14. Medication monitoring encounter  Z51.81        PLAN    Discussed treatment/modality options, including risk and benefits, she desires:    advised alcohol consumption 1oz per day or less, advised aspirin 81 mg po daily, advised 1 multivitamin per day, advised calcium 8287-7779 mg/d and Vitamin D 800-1200 IU/d, advised dentist every 6 months, advised diet, exercise, and weight loss, advised opthalmologist every 0.5 years, advised self breast exam q month, further health care maintenance, further lab(s), medication refill(s) and observation    All diagnosis above reviewed and noted above, otherwise stable.      See [facility] orders for further details.      1) med refills    2) labs reviewed    3) flu vaccine    4) declines pap    5) DEXA in 7/2021, mammo in 8/2021    Return in about 1 year (around 10/26/2021), or if symptoms worsen or fail to improve, for Complete Physical, Medication Recheck Visit.    Health Maintenance Due   Topic Date Due     PAP  12/30/2016     ZOSTER IMMUNIZATION (3 of 3) 10/01/2018       COUNSELING    Reviewed preventive health counseling, as reflected in patient instructions    BP Readings from Last 1 Encounters:   10/26/20 118/72     Estimated body mass index is 34.19 kg/m  as calculated from the following:    Height as of this encounter: 1.6 m (5' 3\").    Weight as of this encounter: 87.5 kg (193 lb).           reports that she has never smoked. She has never used smokeless " tobacco.           Louis Nixon MD, FAAFP     [facility]  [address] [place] [address]  [email or web address]   Office: [phone]  Fax: [phone]  Pager: [phone]

## 2020-10-27 ASSESSMENT — ANXIETY QUESTIONNAIRES
GAD7 TOTAL SCORE: 0
7. FEELING AFRAID AS IF SOMETHING AWFUL MIGHT HAPPEN: NOT AT ALL
3. WORRYING TOO MUCH ABOUT DIFFERENT THINGS: NOT AT ALL
2. NOT BEING ABLE TO STOP OR CONTROL WORRYING: NOT AT ALL
5. BEING SO RESTLESS THAT IT IS HARD TO SIT STILL: NOT AT ALL
IF YOU CHECKED OFF ANY PROBLEMS ON THIS QUESTIONNAIRE, HOW DIFFICULT HAVE THESE PROBLEMS MADE IT FOR YOU TO DO YOUR WORK, TAKE CARE OF THINGS AT HOME, OR GET ALONG WITH OTHER PEOPLE: NOT DIFFICULT AT ALL
1. FEELING NERVOUS, ANXIOUS, OR ON EDGE: NOT AT ALL
6. BECOMING EASILY ANNOYED OR IRRITABLE: NOT AT ALL

## 2020-10-27 ASSESSMENT — PATIENT HEALTH QUESTIONNAIRE - PHQ9
SUM OF ALL RESPONSES TO PHQ QUESTIONS 1-9: 0
5. POOR APPETITE OR OVEREATING: NOT AT ALL

## 2020-10-28 ASSESSMENT — ANXIETY QUESTIONNAIRES: GAD7 TOTAL SCORE: 0

## 2021-09-04 ENCOUNTER — HEALTH MAINTENANCE LETTER (OUTPATIENT)
Age: 66
End: 2021-09-04

## 2021-09-16 DIAGNOSIS — E78.5 HYPERLIPIDEMIA LDL GOAL <130: ICD-10-CM

## 2021-09-17 RX ORDER — ROSUVASTATIN CALCIUM 10 MG/1
TABLET, COATED ORAL
Qty: 90 TABLET | Refills: 0 | Status: SHIPPED | OUTPATIENT
Start: 2021-09-17 | End: 2022-02-08

## 2021-09-17 NOTE — TELEPHONE ENCOUNTER
Patient due for fasting office visit- 90 days supply given.  Routing to team to schedule appointment     Ernestina GIFFORD RN  [facility]  [phone]

## 2021-09-27 NOTE — TELEPHONE ENCOUNTER
Patient will call back in a couple weeks to schedule appointment. She does  and needs to figure out a day she can come in.     Makayla Lopez

## 2021-10-13 DIAGNOSIS — K21.9 GASTROESOPHAGEAL REFLUX DISEASE WITHOUT ESOPHAGITIS: ICD-10-CM

## 2021-10-30 ENCOUNTER — HEALTH MAINTENANCE LETTER (OUTPATIENT)
Age: 66
End: 2021-10-30

## 2021-12-25 ENCOUNTER — HEALTH MAINTENANCE LETTER (OUTPATIENT)
Age: 66
End: 2021-12-25

## 2022-01-03 DIAGNOSIS — Z87.440 PERSONAL HISTORY OF URINARY TRACT INFECTION: ICD-10-CM

## 2022-01-04 RX ORDER — NITROFURANTOIN MACROCRYSTALS 50 MG/1
CAPSULE ORAL
Qty: 90 CAPSULE | Refills: 0 | Status: SHIPPED | OUTPATIENT
Start: 2022-01-04 | End: 2022-02-08

## 2022-01-04 NOTE — TELEPHONE ENCOUNTER
nitroFURantoin macrocrystal (MACRODANTIN) 50 MG capsule 90 capsule 3 10/26/2020  No   Sig - Route: Take 1 capsule (50 mg) by mouth At Bedtime - Oral   Sent to pharmacy as: Nitrofurantoin Macrocrystal 50 MG Oral Capsule (MACRODANTIN)   Class: E-Prescribe       Last office visit: 10/26/2020     Future Office Visit:   Next 5 appointments (look out 90 days)    Feb 08, 2022 10:30 AM  (Arrive by 10:10 AM)  Annual Wellness Visit with Louis Nixon MD  [facility] ([facility] ) [address] [place] [address] [phone]             Routing refill request to provider for review/approval because:  Drug not on the [facility] refill protocol     Janet Seals RN, BSN  [facility]

## 2022-01-14 DIAGNOSIS — K21.9 GASTROESOPHAGEAL REFLUX DISEASE WITHOUT ESOPHAGITIS: ICD-10-CM

## 2022-01-18 NOTE — TELEPHONE ENCOUNTER
Ok x one- has a pending appointment    Ernestina GIFFORDRN BSN  [facility] [place]  [phone]

## 2022-02-08 ENCOUNTER — OFFICE VISIT (OUTPATIENT)
Dept: FAMILY MEDICINE | Facility: CLINIC | Age: 67
End: 2022-02-08
Payer: COMMERCIAL

## 2022-02-08 VITALS
DIASTOLIC BLOOD PRESSURE: 80 MMHG | BODY MASS INDEX: 33.41 KG/M2 | WEIGHT: 195.7 LBS | OXYGEN SATURATION: 99 % | RESPIRATION RATE: 16 BRPM | HEIGHT: 64 IN | SYSTOLIC BLOOD PRESSURE: 122 MMHG | TEMPERATURE: 97 F | HEART RATE: 77 BPM

## 2022-02-08 DIAGNOSIS — Z00.00 ROUTINE GENERAL MEDICAL EXAMINATION AT A HEALTH CARE FACILITY: Primary | ICD-10-CM

## 2022-02-08 DIAGNOSIS — Z12.31 ENCOUNTER FOR SCREENING MAMMOGRAM FOR MALIGNANT NEOPLASM OF BREAST: ICD-10-CM

## 2022-02-08 DIAGNOSIS — Z12.11 SCREEN FOR COLON CANCER: ICD-10-CM

## 2022-02-08 DIAGNOSIS — K51.919 ULCERATIVE COLITIS WITH COMPLICATION, UNSPECIFIED LOCATION (H): ICD-10-CM

## 2022-02-08 DIAGNOSIS — M85.89 OSTEOPENIA OF MULTIPLE SITES: ICD-10-CM

## 2022-02-08 DIAGNOSIS — Z13.820 SCREENING FOR OSTEOPOROSIS: ICD-10-CM

## 2022-02-08 DIAGNOSIS — K21.9 GASTROESOPHAGEAL REFLUX DISEASE WITHOUT ESOPHAGITIS: ICD-10-CM

## 2022-02-08 DIAGNOSIS — E78.5 HYPERLIPIDEMIA LDL GOAL <130: ICD-10-CM

## 2022-02-08 DIAGNOSIS — M15.0 PRIMARY OSTEOARTHRITIS INVOLVING MULTIPLE JOINTS: ICD-10-CM

## 2022-02-08 DIAGNOSIS — Z78.0 ASYMPTOMATIC MENOPAUSAL STATE: ICD-10-CM

## 2022-02-08 DIAGNOSIS — Z51.81 MEDICATION MONITORING ENCOUNTER: ICD-10-CM

## 2022-02-08 DIAGNOSIS — K51.30 ULCERATIVE RECTOSIGMOIDITIS WITHOUT COMPLICATION (H): ICD-10-CM

## 2022-02-08 DIAGNOSIS — F32.1 MAJOR DEPRESSIVE DISORDER, SINGLE EPISODE, MODERATE (H): ICD-10-CM

## 2022-02-08 DIAGNOSIS — L71.9 ROSACEA: ICD-10-CM

## 2022-02-08 DIAGNOSIS — Z23 NEED FOR PNEUMOCOCCAL VACCINATION: ICD-10-CM

## 2022-02-08 DIAGNOSIS — K63.5 POLYP OF COLON, UNSPECIFIED PART OF COLON, UNSPECIFIED TYPE: ICD-10-CM

## 2022-02-08 DIAGNOSIS — Z87.440 PERSONAL HISTORY OF URINARY TRACT INFECTION: ICD-10-CM

## 2022-02-08 DIAGNOSIS — Z80.0 FAMILY HISTORY OF COLON CANCER: ICD-10-CM

## 2022-02-08 LAB
ALBUMIN SERPL-MCNC: 3.9 G/DL (ref 3.4–5)
ALBUMIN UR-MCNC: NEGATIVE MG/DL
ALP SERPL-CCNC: 74 U/L (ref 40–150)
ALT SERPL W P-5'-P-CCNC: 27 U/L (ref 0–50)
ANION GAP SERPL CALCULATED.3IONS-SCNC: 6 MMOL/L (ref 3–14)
APPEARANCE UR: CLEAR
AST SERPL W P-5'-P-CCNC: 20 U/L (ref 0–45)
BILIRUB SERPL-MCNC: 0.4 MG/DL (ref 0.2–1.3)
BILIRUB UR QL STRIP: NEGATIVE
BUN SERPL-MCNC: 8 MG/DL (ref 7–30)
CALCIUM SERPL-MCNC: 9.5 MG/DL (ref 8.5–10.1)
CHLORIDE BLD-SCNC: 103 MMOL/L (ref 94–109)
CHOLEST SERPL-MCNC: 256 MG/DL
CK SERPL-CCNC: 85 U/L (ref 30–225)
CO2 SERPL-SCNC: 26 MMOL/L (ref 20–32)
COLOR UR AUTO: YELLOW
CREAT SERPL-MCNC: 0.68 MG/DL (ref 0.52–1.04)
CRP SERPL-MCNC: 11 MG/L (ref 0–8)
ERYTHROCYTE [DISTWIDTH] IN BLOOD BY AUTOMATED COUNT: 12.9 % (ref 10–15)
ERYTHROCYTE [SEDIMENTATION RATE] IN BLOOD BY WESTERGREN METHOD: 28 MM/HR (ref 0–30)
FASTING STATUS PATIENT QL REPORTED: YES
GFR SERPL CREATININE-BSD FRML MDRD: >90 ML/MIN/1.73M2
GLUCOSE BLD-MCNC: 80 MG/DL (ref 70–99)
GLUCOSE UR STRIP-MCNC: NEGATIVE MG/DL
HCT VFR BLD AUTO: 38.2 % (ref 35–47)
HDLC SERPL-MCNC: 56 MG/DL
HGB BLD-MCNC: 12.7 G/DL (ref 11.7–15.7)
HGB UR QL STRIP: NEGATIVE
KETONES UR STRIP-MCNC: NEGATIVE MG/DL
LDLC SERPL CALC-MCNC: 182 MG/DL
LEUKOCYTE ESTERASE UR QL STRIP: NEGATIVE
MCH RBC QN AUTO: 31.3 PG (ref 26.5–33)
MCHC RBC AUTO-ENTMCNC: 33.2 G/DL (ref 31.5–36.5)
MCV RBC AUTO: 94 FL (ref 78–100)
NITRATE UR QL: NEGATIVE
NONHDLC SERPL-MCNC: 200 MG/DL
PH UR STRIP: 7 [PH] (ref 5–7)
PLATELET # BLD AUTO: 371 10E3/UL (ref 150–450)
POTASSIUM BLD-SCNC: 4.5 MMOL/L (ref 3.4–5.3)
PROT SERPL-MCNC: 8 G/DL (ref 6.8–8.8)
RBC # BLD AUTO: 4.06 10E6/UL (ref 3.8–5.2)
SODIUM SERPL-SCNC: 135 MMOL/L (ref 133–144)
SP GR UR STRIP: 1.01 (ref 1–1.03)
TRIGL SERPL-MCNC: 92 MG/DL
TSH SERPL DL<=0.005 MIU/L-ACNC: 1.27 MU/L (ref 0.4–4)
UROBILINOGEN UR STRIP-ACNC: 0.2 E.U./DL
WBC # BLD AUTO: 5.3 10E3/UL (ref 4–11)

## 2022-02-08 PROCEDURE — 85027 COMPLETE CBC AUTOMATED: CPT | Performed by: FAMILY MEDICINE

## 2022-02-08 PROCEDURE — 86140 C-REACTIVE PROTEIN: CPT | Performed by: FAMILY MEDICINE

## 2022-02-08 PROCEDURE — 82550 ASSAY OF CK (CPK): CPT | Performed by: FAMILY MEDICINE

## 2022-02-08 PROCEDURE — 82043 UR ALBUMIN QUANTITATIVE: CPT | Performed by: FAMILY MEDICINE

## 2022-02-08 PROCEDURE — 80061 LIPID PANEL: CPT | Performed by: FAMILY MEDICINE

## 2022-02-08 PROCEDURE — 85652 RBC SED RATE AUTOMATED: CPT | Performed by: FAMILY MEDICINE

## 2022-02-08 PROCEDURE — 84443 ASSAY THYROID STIM HORMONE: CPT | Performed by: FAMILY MEDICINE

## 2022-02-08 PROCEDURE — 90670 PCV13 VACCINE IM: CPT | Performed by: FAMILY MEDICINE

## 2022-02-08 PROCEDURE — 80053 COMPREHEN METABOLIC PANEL: CPT | Performed by: FAMILY MEDICINE

## 2022-02-08 PROCEDURE — G0402 INITIAL PREVENTIVE EXAM: HCPCS | Performed by: FAMILY MEDICINE

## 2022-02-08 PROCEDURE — G0009 ADMIN PNEUMOCOCCAL VACCINE: HCPCS | Performed by: FAMILY MEDICINE

## 2022-02-08 PROCEDURE — 36415 COLL VENOUS BLD VENIPUNCTURE: CPT | Performed by: FAMILY MEDICINE

## 2022-02-08 PROCEDURE — 81003 URINALYSIS AUTO W/O SCOPE: CPT | Performed by: FAMILY MEDICINE

## 2022-02-08 PROCEDURE — 82306 VITAMIN D 25 HYDROXY: CPT | Performed by: FAMILY MEDICINE

## 2022-02-08 RX ORDER — NITROFURANTOIN MACROCRYSTALS 50 MG/1
50 CAPSULE ORAL AT BEDTIME
Qty: 90 CAPSULE | Refills: 3 | Status: SHIPPED | OUTPATIENT
Start: 2022-02-08 | End: 2023-03-28

## 2022-02-08 RX ORDER — METRONIDAZOLE 7.5 MG/G
GEL TOPICAL 2 TIMES DAILY
Qty: 135 G | Refills: 3 | Status: SHIPPED | OUTPATIENT
Start: 2022-02-08 | End: 2023-04-03

## 2022-02-08 ASSESSMENT — ENCOUNTER SYMPTOMS
SHORTNESS OF BREATH: 0
JOINT SWELLING: 0
HEADACHES: 0
DIZZINESS: 0
FREQUENCY: 0
DYSURIA: 0
FEVER: 0
COUGH: 0
ABDOMINAL PAIN: 1
BREAST MASS: 0
EYE PAIN: 0
WEAKNESS: 0
CONSTIPATION: 0
CHILLS: 0
HEMATURIA: 0
HEARTBURN: 0
PALPITATIONS: 0
SORE THROAT: 0
DIARRHEA: 0
NAUSEA: 0
PARESTHESIAS: 0
HEMATOCHEZIA: 0
NERVOUS/ANXIOUS: 0

## 2022-02-08 ASSESSMENT — MIFFLIN-ST. JEOR: SCORE: 1409.75

## 2022-02-08 ASSESSMENT — ACTIVITIES OF DAILY LIVING (ADL): CURRENT_FUNCTION: NO ASSISTANCE NEEDED

## 2022-02-08 ASSESSMENT — ANXIETY QUESTIONNAIRES
7. FEELING AFRAID AS IF SOMETHING AWFUL MIGHT HAPPEN: NOT AT ALL
1. FEELING NERVOUS, ANXIOUS, OR ON EDGE: NOT AT ALL
GAD7 TOTAL SCORE: 0
5. BEING SO RESTLESS THAT IT IS HARD TO SIT STILL: NOT AT ALL
3. WORRYING TOO MUCH ABOUT DIFFERENT THINGS: NOT AT ALL
2. NOT BEING ABLE TO STOP OR CONTROL WORRYING: NOT AT ALL
IF YOU CHECKED OFF ANY PROBLEMS ON THIS QUESTIONNAIRE, HOW DIFFICULT HAVE THESE PROBLEMS MADE IT FOR YOU TO DO YOUR WORK, TAKE CARE OF THINGS AT HOME, OR GET ALONG WITH OTHER PEOPLE: NOT DIFFICULT AT ALL
6. BECOMING EASILY ANNOYED OR IRRITABLE: NOT AT ALL

## 2022-02-08 ASSESSMENT — PATIENT HEALTH QUESTIONNAIRE - PHQ9
5. POOR APPETITE OR OVEREATING: NOT AT ALL
SUM OF ALL RESPONSES TO PHQ QUESTIONS 1-9: 0

## 2022-02-08 NOTE — PROGRESS NOTES
"[facility]    Kristin Jimenez is a 66 year old female who presents for Preventive Visit.    Patient has been advised of split billing requirements and indicates understanding: Yes     Are you in the first 12 months of your Medicare coverage?  Yes,  Visual Acuity:  Right Eye: 20/40   Left Eye: 20/40  Both Eyes: 20/40    Healthy Habits:     In general, how would you rate your overall health?  Good    Frequency of exercise:  1 day/week    Duration of exercise:  30-45 minutes    Do you usually eat at least 4 servings of fruit and vegetables a day, include whole grains    & fiber and avoid regularly eating high fat or \"junk\" foods?  Yes    Taking medications regularly:  Yes    Medication side effects:  Muscle aches    Ability to successfully perform activities of daily living:  No assistance needed    Home Safety:  No safety concerns identified    Hearing Impairment:  No hearing concerns    In the past 6 months, have you been bothered by leaking of urine?  No    In general, how would you rate your overall mental or emotional health?  Good      PHQ-2 Total Score: 0    Additional concerns today:  No       Do you feel safe in your environment? Yes    Have you ever done Advance Care Planning? (For example, a Health Directive, POLST, or a discussion with a medical provider or your loved ones about your wishes): Yes, patient states has an Advance Care Planning document and will bring a copy to the clinic.     Fall risk  Fallen 2 or more times in the past year?: No  Any fall with injury in the past year?: No    Cognitive Screening   1) Repeat 3 items (Leader, Season, Table)    2) Clock draw: NORMAL  3) 3 item recall: Recalls 2 objects   Results: NORMAL clock, 1-2 items recalled: COGNITIVE IMPAIRMENT LESS LIKELY    Mini-CogTM Copyright MARLENI Olivo. Licensed by the author for use in [facility]; reprinted with permission (randi@.[facility]). All rights reserved.      Do you have sleep " apnea, excessive snoring or daytime drowsiness?: no    Reviewed and updated as needed this visit by clinical staff  Tobacco  Allergies  Meds  Problems  Med Hx  Surg Hx  Fam Hx       Reviewed and updated as needed this visit by Provider               Social History     Tobacco Use     Smoking status: Never Smoker     Smokeless tobacco: Never Used   Substance Use Topics     Alcohol use: Yes     Alcohol/week: 1.0 standard drink     Types: 1 Standard drinks or equivalent per week     Comment: 2 glass of wine per week     If you drink alcohol do you typically have >3 drinks per day or >7 drinks per week? No    Alcohol Use 2/8/2022   Prescreen: >3 drinks/day or >7 drinks/week? No   Prescreen: >3 drinks/day or >7 drinks/week? -   No flowsheet data found.    Current providers sharing in care for this patient include:   Patient Care Team:  Louis Nixon MD as PCP - General  Louis Nixon MD as Assigned PCP    The following health maintenance items are reviewed in Epic and correct as of today:  Health Maintenance Due   Topic Date Due     ANNUAL REVIEW OF HM ORDERS  Never done     ZOSTER IMMUNIZATION (3 of 3) 10/01/2018     FALL RISK ASSESSMENT  Never done     DEXA  07/13/2021     MAMMO SCREENING  08/26/2021     DTAP/TDAP/TD IMMUNIZATION (3 - Td or Tdap) 10/24/2021     COLORECTAL CANCER SCREENING  12/12/2021     FHS-7:   Breast CA Risk Assessment (FHS-7) 2/8/2022   Did any of your first-degree relatives have breast or ovarian cancer? No   Did any of your relatives have bilateral breast cancer? No   Did any man in your family have breast cancer? No   Did any woman in your family have breast and ovarian cancer? No   Did any woman in your family have breast cancer before age 50 y? No   Do you have 2 or more relatives with breast and/or ovarian cancer? No   Do you have 2 or more relatives with breast and/or bowel cancer? No     Mammogram Screening: Recommended annual mammography  Pertinent mammograms are reviewed under the  imaging tab.    Review of Systems   Constitutional: Negative for chills and fever.   HENT: Negative for congestion, ear pain, hearing loss and sore throat.    Eyes: Negative for pain and visual disturbance.   Respiratory: Negative for cough and shortness of breath.    Cardiovascular: Negative for chest pain, palpitations and peripheral edema.   Gastrointestinal: Positive for abdominal pain. Negative for constipation, diarrhea, heartburn, hematochezia and nausea.   Breasts:  Negative for breast mass and discharge.   Genitourinary: Negative for dysuria, frequency, genital sores, hematuria, pelvic pain, urgency, vaginal bleeding and vaginal discharge.   Musculoskeletal: Negative for joint swelling.   Skin: Negative for rash.   Neurological: Negative for dizziness, weakness, headaches and paresthesias.   Psychiatric/Behavioral: Negative for mood changes. The patient is not nervous/anxious.      Lipids    Recent Labs   Lab Test 10/14/20  0955 12/14/19  0927 06/04/16  0909 09/26/15  0858 06/15/15  0902   CHOL 179 172   < > 160 202*   HDL 57 54   < > 56 48*   * 101*   < > 93 140*   TRIG 82 83   < > 54 68   CHOLHDLRATIO  --   --   --  2.9 4.2    < > = values in this interval not displayed.     Depression - no issues    PHQ 7/29/2020 10/27/2020 2/8/2022   PHQ-9 Total Score 0 0 0   Q9: Thoughts of better off dead/self-harm past 2 weeks Not at all Not at all Not at all     KRISTI-7 SCORE 7/29/2020 10/27/2020 2/8/2022   Total Score - - -   Total Score - - -   Total Score 0 0 0     UC - on/off flares - GI upset with dietary changes - no stool  concerns    GERD - some burping/belching    OA - Hips ok    Osteopenia - Dexa due    Colon polyps/FH colon cancer - mom    Glaucoma - [facility]    Health Maintenance     Colonoscopy:  due   FIT:  NA              Mammogram:  due              PAP:  declines   DEXA:  due    Health Maintenance Due   Topic Date Due     ANNUAL REVIEW OF HM ORDERS  Never done     ZOSTER IMMUNIZATION (3 of 3)  10/01/2018     FALL RISK ASSESSMENT  Never done     DEXA  07/13/2021     MAMMO SCREENING  08/26/2021     DTAP/TDAP/TD IMMUNIZATION (3 - Td or Tdap) 10/24/2021     COLORECTAL CANCER SCREENING  12/12/2021       Current Problem List    Patient Active Problem List   Diagnosis     Rosacea     Ulcerative colitis (H)     Major depressive disorder, single episode, moderate (H)     Osteopenia     Hyperlipidemia LDL goal <130     Health Care Home     Advanced directives, counseling/discussion     OA (osteoarthritis)     Family history of colon cancer     GERD (gastroesophageal reflux disease)     Colon polyp     Ulcerative rectosigmoiditis without complication (H)     Glaucoma       Past Medical History    Past Medical History:   Diagnosis Date     Colon polyp      Family history of colon cancer     Mom     GERD (gastroesophageal reflux disease) 2012     Glaucoma     [facility] - bilateral     Hyperlipidemia LDL goal <130      Major depressive disorder, single episode, moderate (H) 05/2009     OA (osteoarthritis) 2004    Lt hip moderate     Osteopenia 07/2009     Rosacea 1998     Ulcerative colitis, unspecified 1985    UC - rare issues     Unspecified hemorrhoids without mention of complication 04/2007       Past Surgical History    Past Surgical History:   Procedure Laterality Date     COLONOSCOPY  08/16/2012    due 5 yrs - 4 mm polyp - FH - diverticulosis     COLONOSCOPY  12/2016    due 5 ytrs - FH     HC REVISE MEDIAN N/CARPAL TUNNEL SURG  12/2002    bilateral - dr acuna     SURGICAL HISTORY OF -   06/2007    Dr Perales - Lt hip CARMELINA     SURGICAL HISTORY OF -   06/2009    Dr Perales - Rt Hip CARMELINA     [facility] COLONOSCOPY THRU STOMA, DIAGNOSTIC  04/07/2012    colon polyp, diverticulosis, hemorrhoids - due 5-10 Yrs       Current Medications    Current Outpatient Medications   Medication Sig Dispense Refill     FLUoxetine (PROZAC) 20 MG capsule Take 3 capsules (60 mg) by mouth daily 270 capsule 3     metroNIDAZOLE  (METROGEL) 0.75 % external gel Apply topically 2 times daily 135 g 3     nitroFURantoin macrocrystal (MACRODANTIN) 50 MG capsule Take 1 capsule (50 mg) by mouth At Bedtime 90 capsule 3     omeprazole (PRILOSEC) 20 MG DR capsule Take 1 capsule (20 mg) by mouth daily 90 capsule 3       Allergies    Allergies   Allergen Reactions     Sulfa Drugs Rash     Rash       Immunizations    Immunization History   Administered Date(s) Administered     COVID-19,PF,Moderna 2021, 2021, 11/10/2021     Influenza Quad, Recombinant, pf(RIV4) (Flublok) 10/26/2020     Influenza, Quad, High Dose, Pf, 65yr+ (Fluzone HD) 11/10/2021     Pneumo Conj 13-V (2010&after) 2022     Pneumococcal 23 valent 10/26/2016     TD (ADULT, 7+) 2002     TDAP Vaccine (Boostrix) 10/24/2011     Zoster vaccine recombinant adjuvanted (SHINGRIX) 2018, 2018     Zoster vaccine, live 10/26/2016, 2018       Family History    Family History   Problem Relation Age of Onset     Lung Cancer Father          Lung CA - SMOKER     Lipids Sister      Lipids Brother      Macular Degeneration Brother      Cerebrovascular Disease Maternal Grandmother      C.A.D. Maternal Grandfather      Prostate Cancer Maternal Grandfather      Alzheimer Disease Paternal Grandmother      Prostate Cancer Paternal Grandfather      Eye Disorder Mother         glaucoma     Cancer - colorectal Mother         AGE 71 WITH METS liver cancer.  Oct. 2006     Eye Disorder Sister         histoplasmosis     Hyperlipidemia Sister      Eye Disorder Brother         histoplasmosis     Hyperlipidemia Brother      Hyperlipidemia Brother      Hyperlipidemia Sister        Social History    Social History     Socioeconomic History     Marital status:      Spouse name: oCmpa     Number of children: 5     Years of education: 12     Highest education level: Not on file   Occupational History     Occupation:      Employer: NONE    Tobacco Use     Smoking  status: Never Smoker     Smokeless tobacco: Never Used   Substance and Sexual Activity     Alcohol use: Yes     Alcohol/week: 1.0 standard drink     Types: 1 Standard drinks or equivalent per week     Comment: 2 glass of wine per week     Drug use: No     Sexual activity: Yes     Partners: Male   Other Topics Concern      Service Not Asked     Blood Transfusions Not Asked     Caffeine Concern Yes     Comment: 2 cups daily     Occupational Exposure Not Asked     Hobby Hazards Not Asked     Sleep Concern Not Asked     Stress Concern Not Asked     Weight Concern Not Asked     Special Diet Not Asked     Back Care Not Asked     Exercise Yes     Comment: 3+/week     Bike Helmet Not Asked     Seat Belt Yes     Self-Exams Not Asked     Parent/sibling w/ CABG, MI or angioplasty before 65F 55M? No   Social History Narrative     Not on file     Social Determinants of Health     Financial Resource Strain: Not on file   Food Insecurity: Not on file   Transportation Needs: Not on file   Physical Activity: Not on file   Stress: Not on file   Social Connections: Not on file   Intimate Partner Violence: Not on file   Housing Stability: Not on file       ROS    CONSTITUTIONAL: NEGATIVE for fever, chills, change in weight  INTEGUMENTARY/SKIN: NEGATIVE for worrisome rashes, moles or lesions  EYES: NEGATIVE for vision changes or irritation  ENT/MOUTH: NEGATIVE for ear, mouth and throat problems  RESP: NEGATIVE for significant cough or SOB  BREAST: NEGATIVE for masses, tenderness or discharge  CV: NEGATIVE for chest pain, palpitations or peripheral edema  GI: NEGATIVE for nausea, abdominal pain, heartburn, or change in bowel habits  : NEGATIVE for frequency, dysuria, or hematuria  MUSCULOSKELETAL: NEGATIVE for significant arthralgias or myalgia  NEURO: NEGATIVE for weakness, dizziness or paresthesias  ENDOCRINE: NEGATIVE for temperature intolerance, skin/hair changes  HEME: NEGATIVE for bleeding problems  PSYCHIATRIC: NEGATIVE  "for changes in mood or affect    OBJECTIVE    /80   Pulse 77   Temp 97  F (36.1  C) (Tympanic)   Resp 16   Ht 1.613 m (5' 3.5\")   Wt 88.8 kg (195 lb 11.2 oz)   LMP 10/20/2003   SpO2 99%   BMI 34.12 kg/m    EXAM:  GENERAL: healthy, alert and no distress  EYES: Eyes grossly normal to inspection, PERRL and conjunctivae and sclerae normal  HENT: ear canals and TM's normal, nose and mouth without ulcers or lesions  NECK: no adenopathy, no asymmetry, masses, or scars and thyroid normal to palpation  RESP: lungs clear to auscultation - no rales, rhonchi or wheezes  BREAST: pt declines  CV: regular rate and rhythm, normal S1 S2, no S3 or S4, no murmur, click or rub, no peripheral edema and peripheral pulses strong  ABDOMEN: soft, nontender, no hepatosplenomegaly, no masses and bowel sounds normal   (female): pt declines  MS: no gross musculoskeletal defects noted, no edema  SKIN: no suspicious lesions or rashes  NEURO: Normal strength and tone, mentation intact and speech normal  PSYCH: mentation appears normal, affect normal/bright  LYMPH: no cervical, supraclavicular, axillary, or inguinal adenopathy    DIAGNOSTICS/PROCEDURES    Pending    ASSESSMENT      ICD-10-CM    1. Routine general medical examination at a health care facility  Z00.00 Comprehensive metabolic panel     Lipid panel reflex to direct LDL Fasting     CBC with platelets     CK total     UA reflex to Microscopic and Culture     Albumin Random Urine Quantitative with Creat Ratio     TSH with free T4 reflex     ESR: Erythrocyte sedimentation rate     CRP, inflammation     Vitamin D Deficiency     Comprehensive metabolic panel     Lipid panel reflex to direct LDL Fasting     CBC with platelets     CK total     TSH with free T4 reflex     ESR: Erythrocyte sedimentation rate     CRP, inflammation     Vitamin D Deficiency     UA reflex to Microscopic and Culture     Albumin Random Urine Quantitative with Creat Ratio     CANCELED: Fecal colorectal " cancer screen FIT     CANCELED: Fecal colorectal cancer screen FIT   2. Hyperlipidemia LDL goal <130  E78.5 Comprehensive metabolic panel     Lipid panel reflex to direct LDL Fasting     CK total     Comprehensive metabolic panel     Lipid panel reflex to direct LDL Fasting     CK total   3. Major depressive disorder, single episode, moderate (H)  F32.1 TSH with free T4 reflex     TSH with free T4 reflex     FLUoxetine (PROZAC) 20 MG capsule   4. Ulcerative colitis with complication, unspecified location (H)  K51.919 Comprehensive metabolic panel     CBC with platelets     ESR: Erythrocyte sedimentation rate     CRP, inflammation     Comprehensive metabolic panel     CBC with platelets     ESR: Erythrocyte sedimentation rate     CRP, inflammation     CANCELED: Fecal colorectal cancer screen FIT     CANCELED: Fecal colorectal cancer screen FIT   5. Ulcerative rectosigmoiditis without complication (H)  K51.30 Comprehensive metabolic panel     CBC with platelets     ESR: Erythrocyte sedimentation rate     CRP, inflammation     Comprehensive metabolic panel     CBC with platelets     ESR: Erythrocyte sedimentation rate     CRP, inflammation     CANCELED: Fecal colorectal cancer screen FIT     CANCELED: Fecal colorectal cancer screen FIT   6. Gastroesophageal reflux disease without esophagitis  K21.9 CBC with platelets     CBC with platelets     omeprazole (PRILOSEC) 20 MG DR capsule   7. Primary osteoarthritis involving multiple joints  M89.49    8. Osteopenia of multiple sites  M85.89 Comprehensive metabolic panel     Vitamin D Deficiency     Comprehensive metabolic panel     Vitamin D Deficiency   9. Rosacea  L71.9 metroNIDAZOLE (METROGEL) 0.75 % external gel   10. Personal history of urinary tract infection  Z87.440 nitroFURantoin macrocrystal (MACRODANTIN) 50 MG capsule   11. Polyp of colon, unspecified part of colon, unspecified type  K63.5 Adult Gastro Ref - Procedure Only     CANCELED: Fecal colorectal cancer  screen FIT     CANCELED: Fecal colorectal cancer screen FIT   12. Family history of colon cancer  Z80.0 Adult Gastro Ref - Procedure Only     CANCELED: Fecal colorectal cancer screen FIT     CANCELED: Fecal colorectal cancer screen FIT   13. Screen for colon cancer  Z12.11 Adult Gastro Ref - Procedure Only     CANCELED: Fecal colorectal cancer screen FIT     CANCELED: Fecal colorectal cancer screen FIT   14. Encounter for screening mammogram for malignant neoplasm of breast  Z12.31 *MA Screening Digital Bilateral   15. Screening for osteoporosis  Z13.820 Comprehensive metabolic panel     Comprehensive metabolic panel     DX Hip/Pelvis/Spine   16. Medication monitoring encounter  Z51.81 Comprehensive metabolic panel     Lipid panel reflex to direct LDL Fasting     CBC with platelets     CK total     UA reflex to Microscopic and Culture     Albumin Random Urine Quantitative with Creat Ratio     TSH with free T4 reflex     ESR: Erythrocyte sedimentation rate     CRP, inflammation     Vitamin D Deficiency     Comprehensive metabolic panel     Lipid panel reflex to direct LDL Fasting     CBC with platelets     CK total     TSH with free T4 reflex     ESR: Erythrocyte sedimentation rate     CRP, inflammation     Vitamin D Deficiency     UA reflex to Microscopic and Culture     Albumin Random Urine Quantitative with Creat Ratio   17. Need for pneumococcal vaccination  Z23 PNEUMOCOCCAL CONJ VACCINE 13 VALENT IM   18. Asymptomatic menopausal state   Z78.0 DX Hip/Pelvis/Spine       PLAN    Discussed treatment/modality options, including risk and benefits, she desires:    advised alcohol consumption 1oz per day or less, advised 1 multivitamin per day, advised calcium 9952-1355 mg/d and Vitamin D 800-1200 IU/d, advised dentist every 6 months, advised diet, exercise, and weight loss, advised opthalmologist every 1-2 years, advised self breast exam q month, further health care maintenance, further lab(s), immunization(s),  "medication refill(s) and observation    All diagnosis above reviewed and noted above, otherwise stable.      See [facility] orders for further details.      1) med refills    2) labs pending    3) immunizations reviewed, prevnar given, TdaP soon    4) DEXA, mammo due    5) Colonoscopy    Return in about 1 year (around 2/8/2023) for Complete Physical, Medication Recheck Visit, Follow Up Chronic.    Health Maintenance Due   Topic Date Due     ANNUAL REVIEW OF  ORDERS  Never done     ZOSTER IMMUNIZATION (3 of 3) 10/01/2018     FALL RISK ASSESSMENT  Never done     DEXA  07/13/2021     MAMMO SCREENING  08/26/2021     DTAP/TDAP/TD IMMUNIZATION (3 - Td or Tdap) 10/24/2021     COLORECTAL CANCER SCREENING  12/12/2021       COUNSELING    Reviewed preventive health counseling, as reflected in patient instructions    BP Readings from Last 1 Encounters:   02/08/22 122/80     Estimated body mass index is 34.12 kg/m  as calculated from the following:    Height as of this encounter: 1.613 m (5' 3.5\").    Weight as of this encounter: 88.8 kg (195 lb 11.2 oz).      Weight management plan: diet and exercise     reports that she has never smoked. She has never used smokeless tobacco.           Louis Nixon MD, FAAFP     [facility]  [address] [place] [id number]  [email or web address]   Office: [phone]  Fax: [phone]  Pager: [phone]     Identified Health Risks:  "

## 2022-02-09 LAB
CREAT UR-MCNC: 21 MG/DL
DEPRECATED CALCIDIOL+CALCIFEROL SERPL-MC: 59 UG/L (ref 20–75)
MICROALBUMIN UR-MCNC: <5 MG/L
MICROALBUMIN/CREAT UR: NORMAL MG/G{CREAT}

## 2022-02-09 ASSESSMENT — ANXIETY QUESTIONNAIRES: GAD7 TOTAL SCORE: 0

## 2022-02-17 PROBLEM — Z71.89 ADVANCED DIRECTIVES, COUNSELING/DISCUSSION: Status: ACTIVE | Noted: 2017-07-06

## 2022-02-18 DIAGNOSIS — E78.5 HYPERLIPIDEMIA LDL GOAL <130: ICD-10-CM

## 2022-02-18 RX ORDER — ROSUVASTATIN CALCIUM 10 MG/1
10 TABLET, COATED ORAL DAILY
Qty: 90 TABLET | Refills: 0 | Status: SHIPPED | OUTPATIENT
Start: 2022-02-18 | End: 2022-12-23

## 2022-03-21 ENCOUNTER — TELEPHONE (OUTPATIENT)
Dept: FAMILY MEDICINE | Facility: CLINIC | Age: 67
End: 2022-03-21
Payer: COMMERCIAL

## 2022-03-21 DIAGNOSIS — F32.1 MAJOR DEPRESSIVE DISORDER, SINGLE EPISODE, MODERATE (H): Primary | ICD-10-CM

## 2022-03-21 NOTE — TELEPHONE ENCOUNTER
Prior Authorization Retail Medication Request    Medication/Dose: fluoxetine 20mg capsules   ICD code (if different than what is on RX):    Previously Tried and Failed:    Rationale:      Insurance Name:  In chart   Insurance ID:        Pharmacy Information (if different than what is on RX)  Name:  [facility]  Phone:  [phone]  Cover My Meds:  [id number]     Plan does not cover, please change RX or advise to start a KATIE Carrera

## 2022-03-21 NOTE — TELEPHONE ENCOUNTER
Reason for Call:  Form, our goal is to have forms completed with 72 hours, however, some forms may require a visit or additional information.    Type of letter, form or note:  medical    Who is the form from?: [employer] (if other please explain)    Where did the form come from: form was faxed in    What clinic location was the form placed at?: [facility]    Where the form was placed: Dr Nixon Box/Folder    What number is listed as a contact on the form?: [phone]       Additional comments: Fluoxetine    Call taken on 3/21/2022 at 11:14 AM by Lizeth Hernandez

## 2022-03-23 RX ORDER — FLUOXETINE HYDROCHLORIDE 60 MG/1
1 TABLET, FILM COATED ORAL; ORAL DAILY
Qty: 90 TABLET | Refills: 3 | Status: SHIPPED | OUTPATIENT
Start: 2022-03-23 | End: 2023-04-03

## 2022-03-23 NOTE — TELEPHONE ENCOUNTER
Unable to find forms    Is this a quantity issues, 3 - 20 mg tablets, or 1 - 60 mg tab - please check with pharmacy

## 2022-03-23 NOTE — TELEPHONE ENCOUNTER
Called pharmacy - chayo stated that if the medication is switched to a 60 mg tablet insurance will cover it     Updated script - sign if okay    Please advise     Thank you     Janet Seals RN, BSN  [facility]

## 2022-05-05 ENCOUNTER — HOSPITAL ENCOUNTER (OUTPATIENT)
Dept: MAMMOGRAPHY | Facility: CLINIC | Age: 67
Discharge: HOME | End: 2022-05-05
Attending: FAMILY MEDICINE | Admitting: FAMILY MEDICINE
Payer: COMMERCIAL

## 2022-05-05 ENCOUNTER — ANCILLARY PROCEDURE (OUTPATIENT)
Dept: BONE DENSITY | Facility: CLINIC | Age: 67
End: 2022-05-05
Attending: FAMILY MEDICINE
Payer: COMMERCIAL

## 2022-05-05 DIAGNOSIS — Z13.820 SCREENING FOR OSTEOPOROSIS: ICD-10-CM

## 2022-05-05 DIAGNOSIS — Z78.0 ASYMPTOMATIC MENOPAUSAL STATE: ICD-10-CM

## 2022-05-05 DIAGNOSIS — Z12.31 ENCOUNTER FOR SCREENING MAMMOGRAM FOR MALIGNANT NEOPLASM OF BREAST: ICD-10-CM

## 2022-05-05 PROCEDURE — 77080 DXA BONE DENSITY AXIAL: CPT | Performed by: INTERNAL MEDICINE

## 2022-05-05 PROCEDURE — 77067 SCR MAMMO BI INCL CAD: CPT

## 2022-05-06 NOTE — RESULT ENCOUNTER NOTE
Dear Jahaira,     -Mammogram was normal.  ADVISE: rechecking in 1 year.      Please send a [employer] message or call [phone]  if you have any questions.      ALINA Doll, CNP for Dr. Nixon while he is out of clinic.   [facility] [place]    If you have further questions about the interpretation of your labs, [email or web address] is a good website to check out for further information.

## 2022-10-16 ENCOUNTER — HEALTH MAINTENANCE LETTER (OUTPATIENT)
Age: 67
End: 2022-10-16

## 2023-02-17 ENCOUNTER — TELEPHONE (OUTPATIENT)
Dept: FAMILY MEDICINE | Facility: CLINIC | Age: 68
End: 2023-02-17

## 2023-02-17 NOTE — TELEPHONE ENCOUNTER
"  Forms/Letter Request    Type of form/letter: Pt received a letter from [employer] stating that her Fluoxetine HCI 60mg will not be covered on her current formulary.  Should the drug be switched to something else.  Patient is concerned about switching the drug as \"she doesn't want to gain weight\".      Do we have the form/letter: No  Should we create a PA and send off to the team.  She has time as she just picked up a 3 month supply.  But wanted to stay on top of this issue.    Pharm is [facility] in [place].    Jackelin Stewart   [employer]            "

## 2023-02-22 NOTE — TELEPHONE ENCOUNTER
Central Prior Authorization Team - Phone: [phone]     PA Initiation    Medication: [employer] sent a letter stated drug not on formular this year.- FLUoxetine HCl 60 MG TABS- PA INITIATED  Insurance Company:    Pharmacy Filling the Rx: [id number] - [place], [place] - [address]  Filling Pharmacy Phone: [phone]  Filling Pharmacy Fax:    Start Date: 2/22/2023

## 2023-02-23 NOTE — TELEPHONE ENCOUNTER
Central Prior Authorization Team - Phone: [phone]     Prior Authorization Approval    Authorization Effective Date: 1/23/2023  Authorization Expiration Date: 2/22/2024  Medication: Camila sent a letter stated drug not on formular this year.- FLUoxetine HCl 60 MG TABS- PA APPROVED  Approved Dose/Quantity: 90  Reference #:     Insurance Company:    Expected CoPay:       CoPay Card Available:      Foundation Assistance Needed:    Which Pharmacy is filling the prescription (Not needed for infusion/clinic administered): [facility] [place] [place] [facility] [address] [facility]  Pharmacy Notified: Yes  Patient Notified: YesComment: called Kristin and spoke with her, let her know PA was approved for one year til 2/22/24.

## 2023-03-27 DIAGNOSIS — Z87.440 PERSONAL HISTORY OF URINARY TRACT INFECTION: ICD-10-CM

## 2023-03-28 RX ORDER — NITROFURANTOIN MACROCRYSTALS 50 MG/1
CAPSULE ORAL
Qty: 90 CAPSULE | Refills: 3 | Status: SHIPPED | OUTPATIENT
Start: 2023-03-28 | End: 2024-03-26

## 2023-03-28 NOTE — TELEPHONE ENCOUNTER
Last seen 2/2022    Next 5 appointments (look out 90 days)    Apr 03, 2023  9:00 AM  (Arrive by 8:40 AM)  Annual Wellness Visit with Louis Nixon MD  [facility] ([facility] ) [address] [place] [address] [phone]        Limited rx only

## 2023-04-01 ENCOUNTER — HEALTH MAINTENANCE LETTER (OUTPATIENT)
Age: 68
End: 2023-04-01

## 2023-04-03 ENCOUNTER — OFFICE VISIT (OUTPATIENT)
Dept: FAMILY MEDICINE | Facility: CLINIC | Age: 68
End: 2023-04-03
Payer: COMMERCIAL

## 2023-04-03 DIAGNOSIS — Z80.0 FAMILY HISTORY OF COLON CANCER: ICD-10-CM

## 2023-04-03 DIAGNOSIS — Z87.440 PERSONAL HISTORY OF URINARY TRACT INFECTION: ICD-10-CM

## 2023-04-03 DIAGNOSIS — E66.811 CLASS 1 OBESITY WITH SERIOUS COMORBIDITY AND BODY MASS INDEX (BMI) OF 34.0 TO 34.9 IN ADULT, UNSPECIFIED OBESITY TYPE: ICD-10-CM

## 2023-04-03 DIAGNOSIS — Z12.31 ENCOUNTER FOR SCREENING MAMMOGRAM FOR MALIGNANT NEOPLASM OF BREAST: ICD-10-CM

## 2023-04-03 DIAGNOSIS — L71.9 ROSACEA: ICD-10-CM

## 2023-04-03 DIAGNOSIS — K51.30 ULCERATIVE RECTOSIGMOIDITIS WITHOUT COMPLICATION (H): ICD-10-CM

## 2023-04-03 DIAGNOSIS — Z23 NEED FOR PNEUMOCOCCAL VACCINATION: ICD-10-CM

## 2023-04-03 DIAGNOSIS — K63.5 POLYP OF COLON, UNSPECIFIED PART OF COLON, UNSPECIFIED TYPE: ICD-10-CM

## 2023-04-03 DIAGNOSIS — F32.1 MAJOR DEPRESSIVE DISORDER, SINGLE EPISODE, MODERATE (H): ICD-10-CM

## 2023-04-03 DIAGNOSIS — Z00.00 ROUTINE GENERAL MEDICAL EXAMINATION AT A HEALTH CARE FACILITY: Primary | ICD-10-CM

## 2023-04-03 DIAGNOSIS — M15.0 PRIMARY OSTEOARTHRITIS INVOLVING MULTIPLE JOINTS: ICD-10-CM

## 2023-04-03 DIAGNOSIS — Z51.81 MEDICATION MONITORING ENCOUNTER: ICD-10-CM

## 2023-04-03 DIAGNOSIS — M85.89 OSTEOPENIA OF MULTIPLE SITES: ICD-10-CM

## 2023-04-03 DIAGNOSIS — E78.5 HYPERLIPIDEMIA LDL GOAL <130: ICD-10-CM

## 2023-04-03 DIAGNOSIS — K21.9 GASTROESOPHAGEAL REFLUX DISEASE WITHOUT ESOPHAGITIS: ICD-10-CM

## 2023-04-03 DIAGNOSIS — Z12.11 SCREEN FOR COLON CANCER: ICD-10-CM

## 2023-04-03 DIAGNOSIS — Z13.820 SCREENING FOR OSTEOPOROSIS: ICD-10-CM

## 2023-04-03 DIAGNOSIS — Z00.00 MEDICARE ANNUAL WELLNESS VISIT, SUBSEQUENT: ICD-10-CM

## 2023-04-03 DIAGNOSIS — H40.9 GLAUCOMA OF BOTH EYES, UNSPECIFIED GLAUCOMA TYPE: ICD-10-CM

## 2023-04-03 LAB
ALBUMIN SERPL BCG-MCNC: 4.4 G/DL (ref 3.5–5.2)
ALBUMIN UR-MCNC: NEGATIVE MG/DL
ALP SERPL-CCNC: 70 U/L (ref 35–104)
ALT SERPL W P-5'-P-CCNC: 17 U/L (ref 10–35)
ANION GAP SERPL CALCULATED.3IONS-SCNC: 12 MMOL/L (ref 7–15)
APPEARANCE UR: CLEAR
AST SERPL W P-5'-P-CCNC: 19 U/L (ref 10–35)
BACTERIA #/AREA URNS HPF: ABNORMAL /HPF
BILIRUB SERPL-MCNC: 0.4 MG/DL
BILIRUB UR QL STRIP: NEGATIVE
BUN SERPL-MCNC: 10.1 MG/DL (ref 8–23)
CALCIUM SERPL-MCNC: 10.1 MG/DL (ref 8.8–10.2)
CHLORIDE SERPL-SCNC: 102 MMOL/L (ref 98–107)
CHOLEST SERPL-MCNC: 255 MG/DL
CK SERPL-CCNC: 61 U/L (ref 26–192)
COLOR UR AUTO: YELLOW
CREAT SERPL-MCNC: 0.69 MG/DL (ref 0.51–0.95)
CREAT UR-MCNC: 124 MG/DL
CRP SERPL-MCNC: <3 MG/L
DEPRECATED HCO3 PLAS-SCNC: 26 MMOL/L (ref 22–29)
ERYTHROCYTE [DISTWIDTH] IN BLOOD BY AUTOMATED COUNT: 12.9 % (ref 10–15)
ERYTHROCYTE [SEDIMENTATION RATE] IN BLOOD BY WESTERGREN METHOD: 18 MM/HR (ref 0–30)
GFR SERPL CREATININE-BSD FRML MDRD: >90 ML/MIN/1.73M2
GLUCOSE SERPL-MCNC: 91 MG/DL (ref 70–99)
GLUCOSE UR STRIP-MCNC: NEGATIVE MG/DL
HCT VFR BLD AUTO: 38.8 % (ref 35–47)
HDLC SERPL-MCNC: 57 MG/DL
HGB BLD-MCNC: 13 G/DL (ref 11.7–15.7)
HGB UR QL STRIP: NEGATIVE
KETONES UR STRIP-MCNC: NEGATIVE MG/DL
LDLC SERPL CALC-MCNC: 181 MG/DL
LEUKOCYTE ESTERASE UR QL STRIP: ABNORMAL
MCH RBC QN AUTO: 31.2 PG (ref 26.5–33)
MCHC RBC AUTO-ENTMCNC: 33.5 G/DL (ref 31.5–36.5)
MCV RBC AUTO: 93 FL (ref 78–100)
MICROALBUMIN UR-MCNC: <12 MG/L
MICROALBUMIN/CREAT UR: NORMAL MG/G{CREAT}
MUCOUS THREADS #/AREA URNS LPF: PRESENT /LPF
NITRATE UR QL: NEGATIVE
NONHDLC SERPL-MCNC: 198 MG/DL
PH UR STRIP: 7.5 [PH] (ref 5–7)
PLATELET # BLD AUTO: 396 10E3/UL (ref 150–450)
POTASSIUM SERPL-SCNC: 4.8 MMOL/L (ref 3.4–5.3)
PROT SERPL-MCNC: 7.9 G/DL (ref 6.4–8.3)
RBC # BLD AUTO: 4.17 10E6/UL (ref 3.8–5.2)
RBC #/AREA URNS AUTO: ABNORMAL /HPF
SODIUM SERPL-SCNC: 140 MMOL/L (ref 136–145)
SP GR UR STRIP: 1.02 (ref 1–1.03)
TRIGL SERPL-MCNC: 86 MG/DL
TSH SERPL DL<=0.005 MIU/L-ACNC: 2.08 UIU/ML (ref 0.3–4.2)
UROBILINOGEN UR STRIP-ACNC: 0.2 E.U./DL
WBC # BLD AUTO: 5.3 10E3/UL (ref 4–11)
WBC #/AREA URNS AUTO: ABNORMAL /HPF

## 2023-04-03 PROCEDURE — 82570 ASSAY OF URINE CREATININE: CPT | Performed by: FAMILY MEDICINE

## 2023-04-03 PROCEDURE — G0438 PPPS, INITIAL VISIT: HCPCS | Performed by: FAMILY MEDICINE

## 2023-04-03 PROCEDURE — 82550 ASSAY OF CK (CPK): CPT | Performed by: FAMILY MEDICINE

## 2023-04-03 PROCEDURE — 85027 COMPLETE CBC AUTOMATED: CPT | Performed by: FAMILY MEDICINE

## 2023-04-03 PROCEDURE — 81001 URINALYSIS AUTO W/SCOPE: CPT | Performed by: FAMILY MEDICINE

## 2023-04-03 PROCEDURE — 99214 OFFICE O/P EST MOD 30 MIN: CPT | Mod: 25 | Performed by: FAMILY MEDICINE

## 2023-04-03 PROCEDURE — 82306 VITAMIN D 25 HYDROXY: CPT | Performed by: FAMILY MEDICINE

## 2023-04-03 PROCEDURE — 84443 ASSAY THYROID STIM HORMONE: CPT | Performed by: FAMILY MEDICINE

## 2023-04-03 PROCEDURE — 82043 UR ALBUMIN QUANTITATIVE: CPT | Performed by: FAMILY MEDICINE

## 2023-04-03 PROCEDURE — G0009 ADMIN PNEUMOCOCCAL VACCINE: HCPCS | Performed by: FAMILY MEDICINE

## 2023-04-03 PROCEDURE — 36415 COLL VENOUS BLD VENIPUNCTURE: CPT | Performed by: FAMILY MEDICINE

## 2023-04-03 PROCEDURE — 80053 COMPREHEN METABOLIC PANEL: CPT | Performed by: FAMILY MEDICINE

## 2023-04-03 PROCEDURE — 80061 LIPID PANEL: CPT | Performed by: FAMILY MEDICINE

## 2023-04-03 PROCEDURE — 86140 C-REACTIVE PROTEIN: CPT | Performed by: FAMILY MEDICINE

## 2023-04-03 PROCEDURE — 90677 PCV20 VACCINE IM: CPT | Performed by: FAMILY MEDICINE

## 2023-04-03 PROCEDURE — 87086 URINE CULTURE/COLONY COUNT: CPT | Performed by: FAMILY MEDICINE

## 2023-04-03 PROCEDURE — 85652 RBC SED RATE AUTOMATED: CPT | Performed by: FAMILY MEDICINE

## 2023-04-03 RX ORDER — METRONIDAZOLE 7.5 MG/G
GEL TOPICAL 2 TIMES DAILY
Qty: 135 G | Refills: 3 | Status: SHIPPED | OUTPATIENT
Start: 2023-04-03 | End: 2024-06-10

## 2023-04-03 RX ORDER — FLUOXETINE HYDROCHLORIDE 60 MG/1
1 TABLET, FILM COATED ORAL; ORAL DAILY
Qty: 90 TABLET | Refills: 3 | Status: SHIPPED | OUTPATIENT
Start: 2023-04-03 | End: 2023-04-05

## 2023-04-03 RX ORDER — ROSUVASTATIN CALCIUM 10 MG/1
5 TABLET, COATED ORAL DAILY
Qty: 45 TABLET | Refills: 3 | Status: SHIPPED | OUTPATIENT
Start: 2023-04-03 | End: 2024-06-10

## 2023-04-03 ASSESSMENT — ENCOUNTER SYMPTOMS
ABDOMINAL PAIN: 0
HEARTBURN: 0
NERVOUS/ANXIOUS: 0
HEMATOCHEZIA: 0
FREQUENCY: 0
WEAKNESS: 0
SHORTNESS OF BREATH: 0
HEMATURIA: 0
BREAST MASS: 0
FEVER: 0
PARESTHESIAS: 0
DIZZINESS: 0
CHILLS: 0
CONSTIPATION: 0
ARTHRALGIAS: 1
DIARRHEA: 0
HEADACHES: 0
NAUSEA: 0
COUGH: 0
MYALGIAS: 1
SORE THROAT: 0
PALPITATIONS: 0
JOINT SWELLING: 1
EYE PAIN: 0

## 2023-04-03 ASSESSMENT — PATIENT HEALTH QUESTIONNAIRE - PHQ9
SUM OF ALL RESPONSES TO PHQ QUESTIONS 1-9: 0
SUM OF ALL RESPONSES TO PHQ QUESTIONS 1-9: 0
10. IF YOU CHECKED OFF ANY PROBLEMS, HOW DIFFICULT HAVE THESE PROBLEMS MADE IT FOR YOU TO DO YOUR WORK, TAKE CARE OF THINGS AT HOME, OR GET ALONG WITH OTHER PEOPLE: NOT DIFFICULT AT ALL

## 2023-04-03 ASSESSMENT — ACTIVITIES OF DAILY LIVING (ADL): CURRENT_FUNCTION: NO ASSISTANCE NEEDED

## 2023-04-03 NOTE — PROGRESS NOTES
"[facility]  LemmonShawn Campo is a 67 year old who presents for Preventive Visit.      4/3/2023     8:48 AM   Additional Questions   Roomed by Josseline Ramirez/AUGUSTO   Accompanied by n/a         4/3/2023     8:48 AM   Patient Reported Additional Medications   Patient reports taking the following new medications no        Patient has been advised of split billing requirements and indicates understanding: Yes.    Are you in the first 12 months of your Medicare coverage?  No    Healthy Habits:     In general, how would you rate your overall health?  Good    Frequency of exercise:  None    Do you usually eat at least 4 servings of fruit and vegetables a day, include whole grains    & fiber and avoid regularly eating high fat or \"junk\" foods?  Yes    Taking medications regularly:  Yes    Medication side effects:  Muscle aches    Ability to successfully perform activities of daily living:  No assistance needed    Home Safety:  No safety concerns identified    Hearing Impairment:  No hearing concerns    In the past 6 months, have you been bothered by leaking of urine?  No    In general, how would you rate your overall mental or emotional health?  Good      PHQ-2 Total Score: 0    Additional concerns today:  No    Have you ever done Advance Care Planning? (For example, a Health Directive, POLST, or a discussion with a medical provider or your loved ones about your wishes): No, advance care planning information given to patient to review.  Patient declined advance care planning discussion at this time.     Fall risk  Fallen 2 or more times in the past year?: No  Any fall with injury in the past year?: No    Cognitive Screening   1) Repeat 3 items (Leader, Season, Table)    2) Clock draw: NORMAL  3) 3 item recall: Recalls 3 objects  Results: 3 items recalled: COGNITIVE IMPAIRMENT LESS LIKELY    Mini-CogTM Copyright S Geovani. Licensed by the author for use in [facility]; reprinted with " permission (randi@[facility]). All rights reserved.      Do you have sleep apnea, excessive snoring or daytime drowsiness?: no    Reviewed and updated as needed this visit by clinical staff   Tobacco  Allergies  Meds  Problems  Med Hx  Surg Hx  Fam Hx          Reviewed and updated as needed this visit by Provider                 Social History     Tobacco Use     Smoking status: Never     Smokeless tobacco: Never   Vaping Use     Vaping status: Never Used   Substance Use Topics     Alcohol use: Yes     Alcohol/week: 7.0 standard drinks of alcohol     Types: 7 Standard drinks or equivalent per week     Comment: 7 glass of wine per week             4/3/2023     8:39 AM   Alcohol Use   Prescreen: >3 drinks/day or >7 drinks/week? No          View : No data to display.              Do you have a current opioid prescription? No  Do you use any other controlled substances or medications that are not prescribed by a provider? None      Current providers sharing in care for this patient include:     Patient Care Team:  Louis Nixon MD as PCP - General  Louis Nixon MD as Assigned PCP    The following health maintenance items are reviewed in Epic and correct as of today:  Health Maintenance   Topic Date Due     COLORECTAL CANCER SCREENING  12/12/2021     COVID-19 Vaccine (4 - Booster for Moderna series) 01/05/2022     INFLUENZA VACCINE (1) 09/01/2022     LIPID  02/08/2023     MAMMO SCREENING  05/05/2023     PHQ-9  10/03/2023     MEDICARE ANNUAL WELLNESS VISIT  04/03/2024     ANNUAL REVIEW OF HM ORDERS  04/03/2024     FALL RISK ASSESSMENT  04/03/2024     DEXA  05/05/2027     ADVANCE CARE PLANNING  04/03/2028     DTAP/TDAP/TD IMMUNIZATION (3 - Td or Tdap) 12/15/2032     HEPATITIS C SCREENING  Completed     DEPRESSION ACTION PLAN  Completed     Pneumococcal Vaccine: 65+ Years  Completed     ZOSTER IMMUNIZATION  Completed     IPV IMMUNIZATION  Aged Out     MENINGITIS IMMUNIZATION  Aged Out     PAP  Discontinued       FHS-7:        2/8/2022    10:14 AM 4/3/2023     8:40 AM   Breast CA Risk Assessment (FHS-7)   Did any of your first-degree relatives have breast or ovarian cancer? No No   Did any of your relatives have bilateral breast cancer? No No   Did any man in your family have breast cancer? No No   Did any woman in your family have breast and ovarian cancer? No No   Did any woman in your family have breast cancer before age 50 y? No No   Do you have 2 or more relatives with breast and/or ovarian cancer? No No   Do you have 2 or more relatives with breast and/or bowel cancer? No Yes     Mom with colon cancer    Mammogram Screening: Recommended annual mammography  Pertinent mammograms are reviewed under the imaging tab.    Review of Systems   Constitutional: Negative for chills and fever.   HENT: Negative for congestion, ear pain, hearing loss and sore throat.    Eyes: Negative for pain and visual disturbance.   Respiratory: Negative for cough and shortness of breath.    Cardiovascular: Negative for chest pain, palpitations and peripheral edema.   Gastrointestinal: Negative for abdominal pain, constipation, diarrhea, heartburn, hematochezia and nausea.   Breasts:  Negative for tenderness, breast mass and discharge.   Genitourinary: Negative for frequency, genital sores, hematuria, pelvic pain, urgency, vaginal bleeding and vaginal discharge.   Musculoskeletal: Positive for arthralgias, joint swelling and myalgias.   Skin: Negative for rash.   Neurological: Negative for dizziness, weakness, headaches and paresthesias.   Psychiatric/Behavioral: Negative for mood changes. The patient is not nervous/anxious.      UC - no issues for years    OA - Left knee pain    Osteopenia - Calcium/Vitamin D/DEXA    Rosacea - doing well    Hx of UTI - none recently - macrobid HS    Lipids - stopped meds secondary to myalgias / arthralgias    Recent Labs   Lab Test 02/08/22  1039 10/14/20  0955 06/04/16  0909 09/26/15  0858 06/15/15  0902   CHOL 256* 179    < > 160 202*   HDL 56 57   < > 56 48*   * 106*   < > 93 140*   TRIG 92 82   < > 54 68   CHOLHDLRATIO  --   --   --  2.9 4.2    < > = values in this interval not displayed.     Depression        10/27/2020     1:19 PM 2/8/2022    10:21 AM 4/3/2023     8:37 AM   PHQ   PHQ-9 Total Score 0 0 0   Q9: Thoughts of better off dead/self-harm past 2 weeks Not at all Not at all Not at all           7/29/2020     9:04 AM 10/27/2020     1:19 PM 2/8/2022    10:21 AM   KRISTI-7 SCORE   Total Score 0 0 0     GERD needs daily omeprazole    Glaucoma - followed by ophthalmology    Health Maintenance     Colonoscopy:  ordered   FIT:  NA              Mammogram:  Due 5/2023              PAP:  NA   DEXA:  Due 5/2025    Health Maintenance Due   Topic Date Due     COLORECTAL CANCER SCREENING  12/12/2021     COVID-19 Vaccine (4 - Booster for Moderna series) 01/05/2022     INFLUENZA VACCINE (1) 09/01/2022     LIPID  02/08/2023       Current Problem List    Patient Active Problem List   Diagnosis     Rosacea     Ulcerative colitis (H)     Major depressive disorder, single episode, moderate (H)     Osteopenia     Hyperlipidemia LDL goal <130     Health Care Home     Advanced directives, counseling/discussion     OA (osteoarthritis)     Family history of colon cancer     GERD (gastroesophageal reflux disease)     Colon polyp     Ulcerative rectosigmoiditis without complication (H)     Glaucoma       Past Medical History    Past Medical History:   Diagnosis Date     Colon polyp      Family history of colon cancer     Mom     GERD (gastroesophageal reflux disease) 2012     Glaucoma     [facility] - bilateral     Hyperlipidemia LDL goal <130      Major depressive disorder, single episode, moderate (H) 05/2009     OA (osteoarthritis) 2004    Lt hip moderate     Osteopenia 07/2009     Rosacea 1998     Ulcerative colitis, unspecified 1985    UC - rare issues     Unspecified hemorrhoids without mention of complication 04/2007       Past Surgical  History    Past Surgical History:   Procedure Laterality Date     COLONOSCOPY  2012    due 5 yrs - 4 mm polyp - FH - diverticulosis     COLONOSCOPY  2016    due 5 ytrs - FH     HC REVISE MEDIAN N/CARPAL TUNNEL SURG  2002    bilateral - dr acuna     SURGICAL HISTORY OF -   2007    Dr Perales - Lt hip CARMELINA     SURGICAL HISTORY OF -   2009    Dr Perales - Rt Hip CARMELINA     [facility] COLONOSCOPY THRU STOMA, DIAGNOSTIC  2012    colon polyp, diverticulosis, hemorrhoids - due 5-10 Yrs       Current Medications    Current Outpatient Medications   Medication Sig Dispense Refill     FLUoxetine HCl 60 MG TABS Take 1 tablet by mouth daily 90 tablet 3     metroNIDAZOLE (METROGEL) 0.75 % external gel Apply topically 2 times daily 135 g 3     nitroFURantoin macrocrystal (MACRODANTIN) 50 MG capsule TAKE ONE CAPSULE BY MOUTH AT BEDTIME 90 capsule 3     omeprazole (PRILOSEC) 20 MG DR capsule Take 1 capsule (20 mg) by mouth daily 90 capsule 3     rosuvastatin (CRESTOR) 10 MG tablet Take 0.5 tablets (5 mg) by mouth daily 45 tablet 3       Allergies    Allergies   Allergen Reactions     Sulfa Drugs Rash     Rash       Immunizations    Immunization History   Administered Date(s) Administered     COVID-19 Vaccine 18+ (Moderna) 2021, 2021, 11/10/2021     Influenza Vaccine 50-64 or 18-64 w/egg allergy (Flublok) 10/26/2020     Influenza Vaccine 65+ (Fluzone HD) 11/10/2021     Pneumo Conj 13-V (2010&after) 2022     Pneumococcal 20 valent Conjugate (Prevnar 20) 2023     Pneumococcal 23 valent 10/26/2016     TD,PF 7+ (Tenivac) 2002     TDAP (Adacel,Boostrix) 12/15/2022     TDAP Vaccine (Boostrix) 10/24/2011     Zoster recombinant adjuvanted (SHINGRIX) 2018, 2018     Zoster vaccine, live 10/26/2016, 2018       Family History    Family History   Problem Relation Age of Onset     Lung Cancer Father          Lung CA - SMOKER     Lipids Sister      Lipids Brother       Macular Degeneration Brother      Cerebrovascular Disease Maternal Grandmother      NOLAN Maternal Grandfather      Prostate Cancer Maternal Grandfather      Alzheimer Disease Paternal Grandmother      Prostate Cancer Paternal Grandfather      Eye Disorder Mother         glaucoma     Cancer - colorectal Mother         AGE 71 WITH METS liver cancer.  Oct. 2006     Eye Disorder Sister         histoplasmosis     Hyperlipidemia Sister      Eye Disorder Brother         histoplasmosis     Hyperlipidemia Brother      Hyperlipidemia Brother      Hyperlipidemia Sister        Social History    Social History     Socioeconomic History     Marital status:      Spouse name: Compa     Number of children: 5     Years of education: 12     Highest education level: Not on file   Occupational History     Occupation:      Employer: NONE    Tobacco Use     Smoking status: Never     Smokeless tobacco: Never   Vaping Use     Vaping status: Never Used   Substance and Sexual Activity     Alcohol use: Yes     Alcohol/week: 7.0 standard drinks of alcohol     Types: 7 Standard drinks or equivalent per week     Comment: 7 glass of wine per week     Drug use: No     Sexual activity: Yes     Partners: Male   Other Topics Concern      Service Not Asked     Blood Transfusions Not Asked     Caffeine Concern Yes     Comment: 2 cups daily     Occupational Exposure Not Asked     Hobby Hazards Not Asked     Sleep Concern Not Asked     Stress Concern Not Asked     Weight Concern Not Asked     Special Diet Not Asked     Back Care Not Asked     Exercise Yes     Comment: 3+/week     Bike Helmet Not Asked     Seat Belt Yes     Self-Exams Not Asked     Parent/sibling w/ CABG, MI or angioplasty before 65F 55M? No   Social History Narrative     Not on file     Social Determinants of Health     Financial Resource Strain: Not on file   Food Insecurity: Not on file   Transportation Needs: Not on file   Physical Activity: Not on file    Stress: Not on file   Social Connections: Not on file   Intimate Partner Violence: Not on file   Housing Stability: Not on file       ROS    CONSTITUTIONAL: NEGATIVE for fever, chills, change in weight  INTEGUMENTARY/SKIN: NEGATIVE for worrisome rashes, moles or lesions  EYES: NEGATIVE for vision changes or irritation  ENT/MOUTH: NEGATIVE for ear, mouth and throat problems  RESP: NEGATIVE for significant cough or SOB  BREAST: NEGATIVE for masses, tenderness or discharge  CV: NEGATIVE for chest pain, palpitations or peripheral edema  GI: NEGATIVE for nausea, abdominal pain, heartburn, or change in bowel habits  : NEGATIVE for frequency, dysuria, or hematuria  MUSCULOSKELETAL: NEGATIVE for significant arthralgias or myalgia  NEURO: NEGATIVE for weakness, dizziness or paresthesias  ENDOCRINE: NEGATIVE for temperature intolerance, skin/hair changes  HEME: NEGATIVE for bleeding problems  PSYCHIATRIC: NEGATIVE for changes in mood or affect    OBJECTIVE    LMP 10/20/2003   EXAM:  GENERAL: healthy, alert and no distress  EYES: Eyes grossly normal to inspection, PERRL and conjunctivae and sclerae normal  HENT: ear canals and TM's normal, nose and mouth without ulcers or lesions  NECK: no adenopathy, no asymmetry, masses, or scars and thyroid normal to palpation  RESP: lungs clear to auscultation - no rales, rhonchi or wheezes  BREAST: pt declines  CV: regular rate and rhythm, normal S1 S2, no S3 or S4, no murmur, click or rub, no peripheral edema and peripheral pulses strong  ABDOMEN: soft, nontender, no hepatosplenomegaly, no masses and bowel sounds normal   (female): pt declines  MS: no gross musculoskeletal defects noted, no edema  SKIN: no suspicious lesions or rashes  NEURO: Normal strength and tone, mentation intact and speech normal  PSYCH: mentation appears normal, affect normal/bright  LYMPH: no cervical, supraclavicular, axillary, or inguinal adenopathy    DIAGNOSTICS/PROCEDURES    Pending    ASSESSMENT       ICD-10-CM    1. Routine general medical examination at a health care facility  Z00.00 Comprehensive metabolic panel     Lipid panel reflex to direct LDL Fasting     CBC with platelets     CK total     UA Macroscopic with reflex to Microscopic and Culture     Albumin Random Urine Quantitative with Creat Ratio     TSH with free T4 reflex     ESR: Erythrocyte sedimentation rate     CRP, inflammation     Vitamin D Deficiency     Comprehensive metabolic panel     Lipid panel reflex to direct LDL Fasting     CBC with platelets     CK total     UA Macroscopic with reflex to Microscopic and Culture     Albumin Random Urine Quantitative with Creat Ratio     TSH with free T4 reflex     ESR: Erythrocyte sedimentation rate     CRP, inflammation     Vitamin D Deficiency     Urine Microscopic Exam     Urine Culture     PRIMARY CARE FOLLOW-UP SCHEDULING     REVIEW OF HEALTH MAINTENANCE PROTOCOL ORDERS     CANCELED: Fecal colorectal cancer screen FIT     CANCELED: Fecal colorectal cancer screen FIT      2. Medicare annual wellness visit, subsequent  Z00.00 Comprehensive metabolic panel     Lipid panel reflex to direct LDL Fasting     CBC with platelets     CK total     UA Macroscopic with reflex to Microscopic and Culture     Albumin Random Urine Quantitative with Creat Ratio     TSH with free T4 reflex     ESR: Erythrocyte sedimentation rate     CRP, inflammation     Vitamin D Deficiency     Comprehensive metabolic panel     Lipid panel reflex to direct LDL Fasting     CBC with platelets     CK total     UA Macroscopic with reflex to Microscopic and Culture     Albumin Random Urine Quantitative with Creat Ratio     TSH with free T4 reflex     ESR: Erythrocyte sedimentation rate     CRP, inflammation     Vitamin D Deficiency     Urine Microscopic Exam     Urine Culture     PRIMARY CARE FOLLOW-UP SCHEDULING     REVIEW OF HEALTH MAINTENANCE PROTOCOL ORDERS     CANCELED: Fecal colorectal cancer screen FIT     CANCELED: Fecal  colorectal cancer screen FIT      3. Ulcerative rectosigmoiditis without complication (H)  K51.30 Comprehensive metabolic panel     CBC with platelets     ESR: Erythrocyte sedimentation rate     CRP, inflammation     Comprehensive metabolic panel     CBC with platelets     ESR: Erythrocyte sedimentation rate     CRP, inflammation     PRIMARY CARE FOLLOW-UP SCHEDULING     REVIEW OF HEALTH MAINTENANCE PROTOCOL ORDERS     OFFICE/OUTPT VISIT,EST,LEVL IV      4. Hyperlipidemia LDL goal <130  E78.5 Comprehensive metabolic panel     Lipid panel reflex to direct LDL Fasting     CK total     Comprehensive metabolic panel     Lipid panel reflex to direct LDL Fasting     CK total     PRIMARY CARE FOLLOW-UP SCHEDULING     REVIEW OF HEALTH MAINTENANCE PROTOCOL ORDERS     rosuvastatin (CRESTOR) 10 MG tablet     OFFICE/OUTPT VISIT,EST,LEVL IV      5. Primary osteoarthritis involving multiple joints  M15.9 PRIMARY CARE FOLLOW-UP SCHEDULING     REVIEW OF HEALTH MAINTENANCE PROTOCOL ORDERS     OFFICE/OUTPT VISIT,EST,LEVL IV      6. Osteopenia of multiple sites  M85.89 Comprehensive metabolic panel     Vitamin D Deficiency     Comprehensive metabolic panel     Vitamin D Deficiency     PRIMARY CARE FOLLOW-UP SCHEDULING     REVIEW OF HEALTH MAINTENANCE PROTOCOL ORDERS     OFFICE/OUTPT VISIT,EST,LEVL IV      7. Rosacea  L71.9 PRIMARY CARE FOLLOW-UP SCHEDULING     REVIEW OF HEALTH MAINTENANCE PROTOCOL ORDERS     metroNIDAZOLE (METROGEL) 0.75 % external gel     OFFICE/OUTPT VISIT,EST,LEVL IV      8. Personal history of urinary tract infection  Z87.440 UA Macroscopic with reflex to Microscopic and Culture     UA Macroscopic with reflex to Microscopic and Culture     Urine Microscopic Exam     Urine Culture     PRIMARY CARE FOLLOW-UP SCHEDULING     REVIEW OF HEALTH MAINTENANCE PROTOCOL ORDERS     OFFICE/OUTPT VISIT,EST,LEVL IV      9. Major depressive disorder, single episode, moderate (H)  F32.1 TSH with free T4 reflex     TSH with free T4  reflex     PRIMARY CARE FOLLOW-UP SCHEDULING     REVIEW OF HEALTH MAINTENANCE PROTOCOL ORDERS     FLUoxetine HCl 60 MG TABS     OFFICE/OUTPT VISIT,EST,LEVL IV      10. Gastroesophageal reflux disease without esophagitis  K21.9 CBC with platelets     CBC with platelets     PRIMARY CARE FOLLOW-UP SCHEDULING     REVIEW OF HEALTH MAINTENANCE PROTOCOL ORDERS     omeprazole (PRILOSEC) 20 MG DR capsule     OFFICE/OUTPT VISIT,EST,LEVL IV      11. Glaucoma of both eyes, unspecified glaucoma type  H40.9 PRIMARY CARE FOLLOW-UP SCHEDULING     REVIEW OF HEALTH MAINTENANCE PROTOCOL ORDERS     OFFICE/OUTPT VISIT,EST,LEVL IV      12. Class 1 obesity with serious comorbidity and body mass index (BMI) of 34.0 to 34.9 in adult, unspecified obesity type  E66.9     Z68.34       13. Polyp of colon, unspecified part of colon, unspecified type  K63.5 PRIMARY CARE FOLLOW-UP SCHEDULING     REVIEW OF HEALTH MAINTENANCE PROTOCOL ORDERS     OFFICE/OUTPT VISIT,EST,LEVL IV     Colonoscopy Screening  Referral     CANCELED: Fecal colorectal cancer screen FIT     CANCELED: Fecal colorectal cancer screen FIT      14. Family history of colon cancer  Z80.0 PRIMARY CARE FOLLOW-UP SCHEDULING     REVIEW OF HEALTH MAINTENANCE PROTOCOL ORDERS     OFFICE/OUTPT VISIT,EST,LEVL IV     Colonoscopy Screening  Referral     CANCELED: Fecal colorectal cancer screen FIT     CANCELED: Fecal colorectal cancer screen FIT      15. Screen for colon cancer  Z12.11 PRIMARY CARE FOLLOW-UP SCHEDULING     REVIEW OF HEALTH MAINTENANCE PROTOCOL ORDERS     OFFICE/OUTPT VISIT,EST,LEVL IV     Colonoscopy Screening  Referral     CANCELED: Fecal colorectal cancer screen FIT     CANCELED: Fecal colorectal cancer screen FIT      16. Encounter for screening mammogram for malignant neoplasm of breast  Z12.31 PRIMARY CARE FOLLOW-UP SCHEDULING     REVIEW OF HEALTH MAINTENANCE PROTOCOL ORDERS     OFFICE/OUTPT VISIT,EST,LEVL IV     MA Screening Digital Bilateral       17. Screening for osteoporosis  Z13.820 Comprehensive metabolic panel     Vitamin D Deficiency     Comprehensive metabolic panel     Vitamin D Deficiency     PRIMARY CARE FOLLOW-UP SCHEDULING     REVIEW OF HEALTH MAINTENANCE PROTOCOL ORDERS     OFFICE/OUTPT VISIT,EST,LEVL IV      18. Medication monitoring encounter  Z51.81 Comprehensive metabolic panel     Lipid panel reflex to direct LDL Fasting     CBC with platelets     CK total     UA Macroscopic with reflex to Microscopic and Culture     Albumin Random Urine Quantitative with Creat Ratio     TSH with free T4 reflex     ESR: Erythrocyte sedimentation rate     CRP, inflammation     Vitamin D Deficiency     Comprehensive metabolic panel     Lipid panel reflex to direct LDL Fasting     CBC with platelets     CK total     UA Macroscopic with reflex to Microscopic and Culture     Albumin Random Urine Quantitative with Creat Ratio     TSH with free T4 reflex     ESR: Erythrocyte sedimentation rate     CRP, inflammation     Vitamin D Deficiency     Urine Microscopic Exam     Urine Culture     PRIMARY CARE FOLLOW-UP SCHEDULING     REVIEW OF HEALTH MAINTENANCE PROTOCOL ORDERS     OFFICE/OUTPT VISIT,EST,LEVL IV      19. Need for pneumococcal vaccination  Z23 PNEUMOCOCCAL 20 VALENT CONJUGATE (PREVNAR 20)          PLAN    Discussed treatment/modality options, including risk and benefits, she desires:    advised alcohol consumption 1oz per day or less, advised 1 multivitamin per day, advised calcium 2977-5194 mg/d and Vitamin D 800-1200 IU/d, advised dentist every 6 months, advised diet, exercise, and weight loss, advised opthalmologist every 1-2 years, advised self breast exam q month, further health care maintenance, further lab(s), immunization(s), medication refill(s) and observation    All diagnosis above reviewed and noted above, otherwise stable.      See [facility] orders for further details.      1) med refills    2) labs pending    3) immunization - Prevnar 20 -  "consider COVID bivalent / Flu    4) colonoscopy    5) mammogram    Return in about 1 year (around 4/3/2024) for Complete Physical, Medication Recheck Visit, Follow Up Chronic.    Health Maintenance Due   Topic Date Due     COLORECTAL CANCER SCREENING  12/12/2021     COVID-19 Vaccine (4 - Booster for Moderna series) 01/05/2022     INFLUENZA VACCINE (1) 09/01/2022     LIPID  02/08/2023       COUNSELING    Reviewed preventive health counseling, as reflected in patient instructions    BP Readings from Last 1 Encounters:   02/08/22 122/80     Estimated body mass index is 34.12 kg/m  as calculated from the following:    Height as of 2/8/22: 1.613 m (5' 3.5\").    Weight as of 2/8/22: 88.8 kg (195 lb 11.2 oz).      Weight management plan: diet and exercise     reports that she has never smoked. She has never used smokeless tobacco.           Louis Nixon MD, FAAFP     [facility]  [address] [place] [id number]  [email or web address]   Office: [phone]  Fax: [phone]  Pager: [phone]     Identified Health Risks:    "

## 2023-04-04 LAB — BACTERIA UR CULT: NO GROWTH

## 2023-04-04 NOTE — TELEPHONE ENCOUNTER
Pharmacy wants to know if the pt could do 20mg + 40mg capsules instead because it is $280 through insurance

## 2023-04-05 ENCOUNTER — TELEPHONE (OUTPATIENT)
Dept: FAMILY MEDICINE | Facility: CLINIC | Age: 68
End: 2023-04-05
Payer: COMMERCIAL

## 2023-04-05 DIAGNOSIS — F32.1 MAJOR DEPRESSIVE DISORDER, SINGLE EPISODE, MODERATE (H): Primary | ICD-10-CM

## 2023-04-05 RX ORDER — FLUOXETINE 40 MG/1
40 CAPSULE ORAL DAILY
Qty: 90 CAPSULE | Refills: 3 | Status: SHIPPED | OUTPATIENT
Start: 2023-04-05 | End: 2024-05-20

## 2023-04-05 NOTE — TELEPHONE ENCOUNTER
Medication Question or Refill    Contacts       Type Contact Phone/Fax    04/05/2023 03:52 PM CDT Fax (Incoming) [facility] - [place], [place] - [address]  (Pharmacy) [phone]          What medication are you calling about (include dose and sig)?:   Fluoxetine HCL 60mg Tabs  Pharmacy is asking if they could dispence 20mg and 40mg capsules instead so ins will pay for it.    Preferred Pharmacy:   [facility] - [place], [place] - [address]  [address] [facility] [address]  Phone: [phone] Fax: [phone]        Who prescribed the medication?: Louis Nixon    Do you need a refill? Yes    Form placed in your folder for review.  Jackelin Stewart   Flex

## 2023-04-06 LAB — DEPRECATED CALCIDIOL+CALCIFEROL SERPL-MC: 51 UG/L (ref 20–75)

## 2023-05-09 ENCOUNTER — ANCILLARY PROCEDURE (OUTPATIENT)
Dept: MAMMOGRAPHY | Facility: CLINIC | Age: 68
End: 2023-05-09
Attending: FAMILY MEDICINE
Payer: COMMERCIAL

## 2023-05-09 DIAGNOSIS — Z12.31 ENCOUNTER FOR SCREENING MAMMOGRAM FOR MALIGNANT NEOPLASM OF BREAST: ICD-10-CM

## 2023-05-09 PROCEDURE — 77067 SCR MAMMO BI INCL CAD: CPT | Mod: TC | Performed by: RADIOLOGY

## 2023-05-09 PROCEDURE — 77063 BREAST TOMOSYNTHESIS BI: CPT | Mod: TC | Performed by: RADIOLOGY

## 2023-10-28 NOTE — TELEPHONE ENCOUNTER
Attempting to call report. Charge unsure if patient is appropriate for the floor. States they will call back   Medication is being filled for 1 time refill only due to:  Patient needs to be seen because she is due for annual visit. .   Santa Youngblood RN

## 2024-03-04 ENCOUNTER — PATIENT OUTREACH (OUTPATIENT)
Dept: CARE COORDINATION | Facility: CLINIC | Age: 69
End: 2024-03-04
Payer: COMMERCIAL

## 2024-03-18 ENCOUNTER — PATIENT OUTREACH (OUTPATIENT)
Dept: CARE COORDINATION | Facility: CLINIC | Age: 69
End: 2024-03-18
Payer: COMMERCIAL

## 2024-03-26 DIAGNOSIS — Z87.440 PERSONAL HISTORY OF URINARY TRACT INFECTION: ICD-10-CM

## 2024-03-26 RX ORDER — NITROFURANTOIN MACROCRYSTALS 50 MG/1
CAPSULE ORAL
Qty: 90 CAPSULE | Refills: 0 | Status: SHIPPED | OUTPATIENT
Start: 2024-03-26 | End: 2024-06-10

## 2024-03-26 NOTE — LETTER
March 27, 2024      Kristin Jimenez  [address] [facility] [address]        Dear Kristin,     We received a refill request from your pharmacy for your  medication.  At this time the nurses were able to give you a lloyd refill, but you are due to be seen for a physical, medication review and fasting labs before the next refill.  This appointment can be scheduled by calling [phone] or can be scheduled via [employer] as well.    Thank you for choosing [facility]      Sincerely,          Louis Nixon M.D.

## 2024-04-09 ENCOUNTER — PATIENT OUTREACH (OUTPATIENT)
Dept: CARE COORDINATION | Facility: CLINIC | Age: 69
End: 2024-04-09
Payer: COMMERCIAL

## 2024-04-22 DIAGNOSIS — K21.9 GASTROESOPHAGEAL REFLUX DISEASE WITHOUT ESOPHAGITIS: ICD-10-CM

## 2024-05-07 ENCOUNTER — PATIENT OUTREACH (OUTPATIENT)
Dept: CARE COORDINATION | Facility: CLINIC | Age: 69
End: 2024-05-07
Payer: COMMERCIAL

## 2024-05-20 DIAGNOSIS — F32.1 MAJOR DEPRESSIVE DISORDER, SINGLE EPISODE, MODERATE (H): ICD-10-CM

## 2024-05-20 RX ORDER — FLUOXETINE 40 MG/1
40 CAPSULE ORAL DAILY
Qty: 90 CAPSULE | Refills: 0 | Status: SHIPPED | OUTPATIENT
Start: 2024-05-20 | End: 2024-06-10

## 2024-05-20 NOTE — TELEPHONE ENCOUNTER
90 day refill signed. It looks like she is due for annual wellness visit as well as medication follow up. Please help schedule office visit.    Hussain Garcia,   5/20/2024 12:39 PM

## 2024-06-01 ENCOUNTER — HEALTH MAINTENANCE LETTER (OUTPATIENT)
Age: 69
End: 2024-06-01

## 2024-06-10 ENCOUNTER — OFFICE VISIT (OUTPATIENT)
Dept: FAMILY MEDICINE | Facility: CLINIC | Age: 69
End: 2024-06-10
Payer: COMMERCIAL

## 2024-06-10 VITALS
DIASTOLIC BLOOD PRESSURE: 74 MMHG | TEMPERATURE: 97.8 F | BODY MASS INDEX: 33.49 KG/M2 | HEART RATE: 80 BPM | WEIGHT: 182 LBS | SYSTOLIC BLOOD PRESSURE: 122 MMHG | RESPIRATION RATE: 13 BRPM | HEIGHT: 62 IN | OXYGEN SATURATION: 99 %

## 2024-06-10 DIAGNOSIS — Z13.820 SCREENING FOR OSTEOPOROSIS: ICD-10-CM

## 2024-06-10 DIAGNOSIS — K51.919 ULCERATIVE COLITIS WITH COMPLICATION, UNSPECIFIED LOCATION (H): ICD-10-CM

## 2024-06-10 DIAGNOSIS — M85.89 OSTEOPENIA OF MULTIPLE SITES: ICD-10-CM

## 2024-06-10 DIAGNOSIS — K51.30 ULCERATIVE RECTOSIGMOIDITIS WITHOUT COMPLICATION (H): ICD-10-CM

## 2024-06-10 DIAGNOSIS — Z12.11 SCREEN FOR COLON CANCER: ICD-10-CM

## 2024-06-10 DIAGNOSIS — Z80.0 FAMILY HISTORY OF COLON CANCER: ICD-10-CM

## 2024-06-10 DIAGNOSIS — Z00.00 MEDICARE ANNUAL WELLNESS VISIT, SUBSEQUENT: ICD-10-CM

## 2024-06-10 DIAGNOSIS — Z00.00 ROUTINE GENERAL MEDICAL EXAMINATION AT A HEALTH CARE FACILITY: Primary | ICD-10-CM

## 2024-06-10 DIAGNOSIS — K63.5 POLYP OF COLON, UNSPECIFIED PART OF COLON, UNSPECIFIED TYPE: ICD-10-CM

## 2024-06-10 DIAGNOSIS — E66.811 CLASS 1 OBESITY WITH SERIOUS COMORBIDITY AND BODY MASS INDEX (BMI) OF 33.0 TO 33.9 IN ADULT, UNSPECIFIED OBESITY TYPE: ICD-10-CM

## 2024-06-10 DIAGNOSIS — Z12.31 ENCOUNTER FOR SCREENING MAMMOGRAM FOR MALIGNANT NEOPLASM OF BREAST: ICD-10-CM

## 2024-06-10 DIAGNOSIS — Z87.440 PERSONAL HISTORY OF URINARY TRACT INFECTION: ICD-10-CM

## 2024-06-10 DIAGNOSIS — E78.5 HYPERLIPIDEMIA LDL GOAL <130: ICD-10-CM

## 2024-06-10 DIAGNOSIS — L71.9 ROSACEA: ICD-10-CM

## 2024-06-10 DIAGNOSIS — H40.9 GLAUCOMA OF BOTH EYES, UNSPECIFIED GLAUCOMA TYPE: ICD-10-CM

## 2024-06-10 DIAGNOSIS — F32.1 MAJOR DEPRESSIVE DISORDER, SINGLE EPISODE, MODERATE (H): ICD-10-CM

## 2024-06-10 DIAGNOSIS — Z51.81 MEDICATION MONITORING ENCOUNTER: ICD-10-CM

## 2024-06-10 DIAGNOSIS — M15.0 PRIMARY OSTEOARTHRITIS INVOLVING MULTIPLE JOINTS: ICD-10-CM

## 2024-06-10 DIAGNOSIS — K21.9 GASTROESOPHAGEAL REFLUX DISEASE WITHOUT ESOPHAGITIS: ICD-10-CM

## 2024-06-10 LAB
ALBUMIN SERPL BCG-MCNC: 4.2 G/DL (ref 3.5–5.2)
ALBUMIN UR-MCNC: NEGATIVE MG/DL
ALP SERPL-CCNC: 65 U/L (ref 40–150)
ALT SERPL W P-5'-P-CCNC: 17 U/L (ref 0–50)
ANION GAP SERPL CALCULATED.3IONS-SCNC: 9 MMOL/L (ref 7–15)
APPEARANCE UR: CLEAR
AST SERPL W P-5'-P-CCNC: 18 U/L (ref 0–45)
BACTERIA #/AREA URNS HPF: ABNORMAL /HPF
BILIRUB SERPL-MCNC: 0.3 MG/DL
BILIRUB UR QL STRIP: NEGATIVE
BUN SERPL-MCNC: 8.8 MG/DL (ref 8–23)
CALCIUM SERPL-MCNC: 9.4 MG/DL (ref 8.8–10.2)
CHLORIDE SERPL-SCNC: 103 MMOL/L (ref 98–107)
CHOLEST SERPL-MCNC: 222 MG/DL
CK SERPL-CCNC: 79 U/L (ref 26–192)
COLOR UR AUTO: YELLOW
CREAT SERPL-MCNC: 0.62 MG/DL (ref 0.51–0.95)
CREAT UR-MCNC: 75.9 MG/DL
CRP SERPL-MCNC: <3 MG/L
DEPRECATED HCO3 PLAS-SCNC: 26 MMOL/L (ref 22–29)
EGFRCR SERPLBLD CKD-EPI 2021: >90 ML/MIN/1.73M2
ERYTHROCYTE [DISTWIDTH] IN BLOOD BY AUTOMATED COUNT: 13 % (ref 10–15)
ERYTHROCYTE [SEDIMENTATION RATE] IN BLOOD BY WESTERGREN METHOD: 17 MM/HR (ref 0–30)
FASTING STATUS PATIENT QL REPORTED: YES
FASTING STATUS PATIENT QL REPORTED: YES
GLUCOSE SERPL-MCNC: 91 MG/DL (ref 70–99)
GLUCOSE UR STRIP-MCNC: NEGATIVE MG/DL
HCT VFR BLD AUTO: 38 % (ref 35–47)
HDLC SERPL-MCNC: 51 MG/DL
HGB BLD-MCNC: 12.6 G/DL (ref 11.7–15.7)
HGB UR QL STRIP: NEGATIVE
KETONES UR STRIP-MCNC: NEGATIVE MG/DL
LDLC SERPL CALC-MCNC: 147 MG/DL
LEUKOCYTE ESTERASE UR QL STRIP: ABNORMAL
MCH RBC QN AUTO: 31.3 PG (ref 26.5–33)
MCHC RBC AUTO-ENTMCNC: 33.2 G/DL (ref 31.5–36.5)
MCV RBC AUTO: 94 FL (ref 78–100)
MICROALBUMIN UR-MCNC: <12 MG/L
MICROALBUMIN/CREAT UR: NORMAL MG/G{CREAT}
NITRATE UR QL: NEGATIVE
NONHDLC SERPL-MCNC: 171 MG/DL
PH UR STRIP: 7.5 [PH] (ref 5–7)
PLATELET # BLD AUTO: 368 10E3/UL (ref 150–450)
POTASSIUM SERPL-SCNC: 4.4 MMOL/L (ref 3.4–5.3)
PROT SERPL-MCNC: 7.2 G/DL (ref 6.4–8.3)
RBC # BLD AUTO: 4.03 10E6/UL (ref 3.8–5.2)
RBC #/AREA URNS AUTO: ABNORMAL /HPF
SODIUM SERPL-SCNC: 138 MMOL/L (ref 135–145)
SP GR UR STRIP: 1.02 (ref 1–1.03)
SQUAMOUS #/AREA URNS AUTO: ABNORMAL /LPF
TRIGL SERPL-MCNC: 121 MG/DL
TSH SERPL DL<=0.005 MIU/L-ACNC: 1.51 UIU/ML (ref 0.3–4.2)
UROBILINOGEN UR STRIP-ACNC: 0.2 E.U./DL
VIT D+METAB SERPL-MCNC: 49 NG/ML (ref 20–50)
WBC # BLD AUTO: 3.9 10E3/UL (ref 4–11)
WBC #/AREA URNS AUTO: ABNORMAL /HPF

## 2024-06-10 PROCEDURE — 82570 ASSAY OF URINE CREATININE: CPT | Performed by: FAMILY MEDICINE

## 2024-06-10 PROCEDURE — 85027 COMPLETE CBC AUTOMATED: CPT | Performed by: FAMILY MEDICINE

## 2024-06-10 PROCEDURE — 81001 URINALYSIS AUTO W/SCOPE: CPT | Performed by: FAMILY MEDICINE

## 2024-06-10 PROCEDURE — 99214 OFFICE O/P EST MOD 30 MIN: CPT | Mod: 25 | Performed by: FAMILY MEDICINE

## 2024-06-10 PROCEDURE — 86140 C-REACTIVE PROTEIN: CPT | Performed by: FAMILY MEDICINE

## 2024-06-10 PROCEDURE — 82043 UR ALBUMIN QUANTITATIVE: CPT | Performed by: FAMILY MEDICINE

## 2024-06-10 PROCEDURE — 82550 ASSAY OF CK (CPK): CPT | Performed by: FAMILY MEDICINE

## 2024-06-10 PROCEDURE — 82306 VITAMIN D 25 HYDROXY: CPT | Performed by: FAMILY MEDICINE

## 2024-06-10 PROCEDURE — 80053 COMPREHEN METABOLIC PANEL: CPT | Performed by: FAMILY MEDICINE

## 2024-06-10 PROCEDURE — 85652 RBC SED RATE AUTOMATED: CPT | Performed by: FAMILY MEDICINE

## 2024-06-10 PROCEDURE — 36415 COLL VENOUS BLD VENIPUNCTURE: CPT | Performed by: FAMILY MEDICINE

## 2024-06-10 PROCEDURE — 80061 LIPID PANEL: CPT | Performed by: FAMILY MEDICINE

## 2024-06-10 PROCEDURE — G0439 PPPS, SUBSEQ VISIT: HCPCS | Performed by: FAMILY MEDICINE

## 2024-06-10 PROCEDURE — 84443 ASSAY THYROID STIM HORMONE: CPT | Performed by: FAMILY MEDICINE

## 2024-06-10 RX ORDER — NITROFURANTOIN MACROCRYSTALS 50 MG/1
50 CAPSULE ORAL AT BEDTIME
Qty: 90 CAPSULE | Refills: 3 | Status: SHIPPED | OUTPATIENT
Start: 2024-06-10

## 2024-06-10 RX ORDER — METRONIDAZOLE 7.5 MG/G
GEL TOPICAL 2 TIMES DAILY
Qty: 45 G | Refills: 3 | Status: SHIPPED | OUTPATIENT
Start: 2024-06-10

## 2024-06-10 RX ORDER — FLUOXETINE 40 MG/1
40 CAPSULE ORAL DAILY
Qty: 90 CAPSULE | Refills: 3 | Status: SHIPPED | OUTPATIENT
Start: 2024-06-10

## 2024-06-10 SDOH — HEALTH STABILITY: PHYSICAL HEALTH: ON AVERAGE, HOW MANY MINUTES DO YOU ENGAGE IN EXERCISE AT THIS LEVEL?: 20 MIN

## 2024-06-10 SDOH — HEALTH STABILITY: PHYSICAL HEALTH: ON AVERAGE, HOW MANY DAYS PER WEEK DO YOU ENGAGE IN MODERATE TO STRENUOUS EXERCISE (LIKE A BRISK WALK)?: 3 DAYS

## 2024-06-10 ASSESSMENT — ANXIETY QUESTIONNAIRES
8. IF YOU CHECKED OFF ANY PROBLEMS, HOW DIFFICULT HAVE THESE MADE IT FOR YOU TO DO YOUR WORK, TAKE CARE OF THINGS AT HOME, OR GET ALONG WITH OTHER PEOPLE?: NOT DIFFICULT AT ALL
GAD7 TOTAL SCORE: 0
7. FEELING AFRAID AS IF SOMETHING AWFUL MIGHT HAPPEN: NOT AT ALL
1. FEELING NERVOUS, ANXIOUS, OR ON EDGE: NOT AT ALL
2. NOT BEING ABLE TO STOP OR CONTROL WORRYING: NOT AT ALL
IF YOU CHECKED OFF ANY PROBLEMS ON THIS QUESTIONNAIRE, HOW DIFFICULT HAVE THESE PROBLEMS MADE IT FOR YOU TO DO YOUR WORK, TAKE CARE OF THINGS AT HOME, OR GET ALONG WITH OTHER PEOPLE: NOT DIFFICULT AT ALL
4. TROUBLE RELAXING: NOT AT ALL
3. WORRYING TOO MUCH ABOUT DIFFERENT THINGS: NOT AT ALL
6. BECOMING EASILY ANNOYED OR IRRITABLE: NOT AT ALL
7. FEELING AFRAID AS IF SOMETHING AWFUL MIGHT HAPPEN: NOT AT ALL
5. BEING SO RESTLESS THAT IT IS HARD TO SIT STILL: NOT AT ALL
GAD7 TOTAL SCORE: 0
GAD7 TOTAL SCORE: 0

## 2024-06-10 ASSESSMENT — PATIENT HEALTH QUESTIONNAIRE - PHQ9
SUM OF ALL RESPONSES TO PHQ QUESTIONS 1-9: 0
SUM OF ALL RESPONSES TO PHQ QUESTIONS 1-9: 0

## 2024-06-10 ASSESSMENT — SOCIAL DETERMINANTS OF HEALTH (SDOH): HOW OFTEN DO YOU GET TOGETHER WITH FRIENDS OR RELATIVES?: MORE THAN THREE TIMES A WEEK

## 2024-06-10 NOTE — LETTER
[facility]  [address] [place] [address]  [phone]                    June 20, 2024    Kristin Jimenez  [address] [facility] [place] [address]      Dear Kristin,    Here is a summary of your recent test results:    Rx done, fasting labs recheck in 3-4 months     Your test results are enclosed.      Please contact me if you have any questions.    In addition, here is a list of due or overdue Health Maintenance reminders.    Health Maintenance Due   Topic Date Due    RSV VACCINE (Pregnancy & 60+) (1 - 1-dose 60+ series) Never done    COVID-19 Vaccine (4 - 2023-24 season) 09/01/2023    Colorectal Cancer Screening  12/18/2023    Mammogram  05/09/2024       Please call us at [phone] (or use [employer]) to address the above recommendations.            Thank you very much for trusting [facility].     Healthy regards,          Louis Nixon M.D.        Results for orders placed or performed in visit on 06/10/24   Comprehensive metabolic panel     Status: None   Result Value Ref Range    Sodium 138 135 - 145 mmol/L    Potassium 4.4 3.4 - 5.3 mmol/L    Carbon Dioxide (CO2) 26 22 - 29 mmol/L    Anion Gap 9 7 - 15 mmol/L    Urea Nitrogen 8.8 8.0 - 23.0 mg/dL    Creatinine 0.62 0.51 - 0.95 mg/dL    GFR Estimate >90 >60 mL/min/1.73m2    Calcium 9.4 8.8 - 10.2 mg/dL    Chloride 103 98 - 107 mmol/L    Glucose 91 70 - 99 mg/dL    Alkaline Phosphatase 65 40 - 150 U/L    AST 18 0 - 45 U/L    ALT 17 0 - 50 U/L    Protein Total 7.2 6.4 - 8.3 g/dL    Albumin 4.2 3.5 - 5.2 g/dL    Bilirubin Total 0.3 <=1.2 mg/dL    Patient Fasting > 8hrs? Yes    Lipid panel reflex to direct LDL Fasting     Status: Abnormal   Result Value Ref Range    Cholesterol 222 (H) <200 mg/dL    Triglycerides 121 <150 mg/dL    Direct Measure HDL 51 >=50 mg/dL    LDL Cholesterol Calculated 147 (H) <=100 mg/dL    Non HDL Cholesterol 171 (H) <130 mg/dL    Patient Fasting > 8hrs? Yes      Narrative    Cholesterol  Desirable:  <200 mg/dL    Triglycerides  Normal:  Less than 150 mg/dL  Borderline High:  150-199 mg/dL  High:  200-499 mg/dL  Very High:  Greater than or equal to 500 mg/dL    Direct Measure HDL  Female:  Greater than or equal to 50 mg/dL   Male:  Greater than or equal to 40 mg/dL    LDL Cholesterol  Desirable:  <100mg/dL  Above Desirable:  100-129 mg/dL   Borderline High:  130-159 mg/dL   High:  160-189 mg/dL   Very High:  >= 190 mg/dL    Non HDL Cholesterol  Desirable:  130 mg/dL  Above Desirable:  130-159 mg/dL  Borderline High:  160-189 mg/dL  High:  190-219 mg/dL  Very High:  Greater than or equal to 220 mg/dL   CBC with platelets     Status: Abnormal   Result Value Ref Range    WBC Count 3.9 (L) 4.0 - 11.0 10e3/uL    RBC Count 4.03 3.80 - 5.20 10e6/uL    Hemoglobin 12.6 11.7 - 15.7 g/dL    Hematocrit 38.0 35.0 - 47.0 %    MCV 94 78 - 100 fL    MCH 31.3 26.5 - 33.0 pg    MCHC 33.2 31.5 - 36.5 g/dL    RDW 13.0 10.0 - 15.0 %    Platelet Count 368 150 - 450 10e3/uL   CK total     Status: Normal   Result Value Ref Range    CK 79 26 - 192 U/L   UA Macroscopic with reflex to Microscopic and Culture     Status: Abnormal    Specimen: Urine, Midstream   Result Value Ref Range    Color Urine Yellow Colorless, Straw, Light Yellow, Yellow    Appearance Urine Clear Clear    Glucose Urine Negative Negative mg/dL    Bilirubin Urine Negative Negative    Ketones Urine Negative Negative mg/dL    Specific Gravity Urine 1.020 1.003 - 1.035    Blood Urine Negative Negative    pH Urine 7.5 (H) 5.0 - 7.0    Protein Albumin Urine Negative Negative mg/dL    Urobilinogen Urine 0.2 0.2, 1.0 E.U./dL    Nitrite Urine Negative Negative    Leukocyte Esterase Urine Trace (A) Negative   Albumin Random Urine Quantitative with Creat Ratio     Status: None   Result Value Ref Range    Creatinine Urine mg/dL 75.9 mg/dL    Albumin Urine mg/L <12.0 mg/L    Albumin Urine mg/g Cr     TSH with free T4 reflex     Status: Normal    Result Value Ref Range    TSH 1.51 0.30 - 4.20 uIU/mL   Vitamin D Deficiency     Status: Normal   Result Value Ref Range    Vitamin D, Total (25-Hydroxy) 49 20 - 50 ng/mL    Narrative    Season, race, dietary intake, and treatment affect the concentration of 25-hydroxy-Vitamin D. Values may decrease during winter months and increase during summer months.    Vitamin D determination is routinely performed by an immunoassay specific for 25 hydroxyvitamin D3.  If an individual is on vitamin D2(ergocalciferol) supplementation, please specify 25 OH vitamin D2 and D3 level determination by LCMSMS test [id number].     ESR: Erythrocyte sedimentation rate     Status: Normal   Result Value Ref Range    Erythrocyte Sedimentation Rate 17 0 - 30 mm/hr   CRP, inflammation     Status: Normal   Result Value Ref Range    CRP Inflammation <3.00 <5.00 mg/L   UA Microscopic with Reflex to Culture     Status: Abnormal   Result Value Ref Range    Bacteria Urine Few (A) None Seen /HPF    RBC Urine 0-2 0-2 /HPF /HPF    WBC Urine 0-5 0-5 /HPF /HPF    Squamous Epithelials Urine Few (A) None Seen /LPF    Narrative    Urine Culture not indicated

## 2024-06-10 NOTE — PROGRESS NOTES
Preventive Care Visit  [facility] [place]  Louis Nixon MD, Family Medicine  Heath 10, 2024      Assessment & Plan     Routine general medical examination at a health care facility    - Comprehensive metabolic panel  - Lipid panel reflex to direct LDL Fasting  - CBC with platelets  - CK total  - UA Macroscopic with reflex to Microscopic and Culture  - Albumin Random Urine Quantitative with Creat Ratio  - TSH with free T4 reflex  - Vitamin D Deficiency  - ESR: Erythrocyte sedimentation rate  - CRP, inflammation  - REVIEW OF HEALTH MAINTENANCE PROTOCOL ORDERS  - Comprehensive metabolic panel  - Lipid panel reflex to direct LDL Fasting  - CBC with platelets  - CK total  - UA Macroscopic with reflex to Microscopic and Culture  - Albumin Random Urine Quantitative with Creat Ratio  - TSH with free T4 reflex  - Vitamin D Deficiency  - ESR: Erythrocyte sedimentation rate  - CRP, inflammation  - UA Microscopic with Reflex to Culture    Medicare annual wellness visit, subsequent    - Comprehensive metabolic panel  - Lipid panel reflex to direct LDL Fasting  - CBC with platelets  - CK total  - UA Macroscopic with reflex to Microscopic and Culture  - Albumin Random Urine Quantitative with Creat Ratio  - TSH with free T4 reflex  - Vitamin D Deficiency  - ESR: Erythrocyte sedimentation rate  - CRP, inflammation  - REVIEW OF HEALTH MAINTENANCE PROTOCOL ORDERS  - Comprehensive metabolic panel  - Lipid panel reflex to direct LDL Fasting  - CBC with platelets  - CK total  - UA Macroscopic with reflex to Microscopic and Culture  - Albumin Random Urine Quantitative with Creat Ratio  - TSH with free T4 reflex  - Vitamin D Deficiency  - ESR: Erythrocyte sedimentation rate  - CRP, inflammation  - UA Microscopic with Reflex to Culture    Ulcerative colitis with complication, unspecified location (H)    - Comprehensive metabolic panel  - CBC with platelets  - ESR: Erythrocyte sedimentation rate  - CRP, inflammation  -  Colonoscopy Screening  Referral  - REVIEW OF HEALTH MAINTENANCE PROTOCOL ORDERS  - Comprehensive metabolic panel  - CBC with platelets  - ESR: Erythrocyte sedimentation rate  - CRP, inflammation    Ulcerative rectosigmoiditis without complication (H)    - Comprehensive metabolic panel  - CBC with platelets  - ESR: Erythrocyte sedimentation rate  - CRP, inflammation  - Colonoscopy Screening  Referral  - REVIEW OF HEALTH MAINTENANCE PROTOCOL ORDERS  - Comprehensive metabolic panel  - CBC with platelets  - ESR: Erythrocyte sedimentation rate  - CRP, inflammation    Polyp of colon, unspecified part of colon, unspecified type    - Colonoscopy Screening  Referral  - REVIEW OF HEALTH MAINTENANCE PROTOCOL ORDERS    Family history of colon cancer    - Colonoscopy Screening  Referral  - REVIEW OF HEALTH MAINTENANCE PROTOCOL ORDERS    Gastroesophageal reflux disease without esophagitis    - CBC with platelets  - REVIEW OF HEALTH MAINTENANCE PROTOCOL ORDERS  - CBC with platelets  - omeprazole (PRILOSEC) 20 MG DR capsule  Dispense: 60 capsule; Refill: 3    Hyperlipidemia LDL goal <130    - Comprehensive metabolic panel  - Lipid panel reflex to direct LDL Fasting  - CK total  - REVIEW OF HEALTH MAINTENANCE PROTOCOL ORDERS  - Comprehensive metabolic panel  - Lipid panel reflex to direct LDL Fasting  - CK total    Major depressive disorder, single episode, moderate (H)    - TSH with free T4 reflex  - REVIEW OF HEALTH MAINTENANCE PROTOCOL ORDERS  - TSH with free T4 reflex  - FLUoxetine (PROZAC) 40 MG capsule  Dispense: 90 capsule; Refill: 3    Primary osteoarthritis involving multiple joints    - REVIEW OF HEALTH MAINTENANCE PROTOCOL ORDERS    Osteopenia of multiple sites    - Comprehensive metabolic panel  - Vitamin D Deficiency  - REVIEW OF HEALTH MAINTENANCE PROTOCOL ORDERS  - Comprehensive metabolic panel  - Vitamin D Deficiency    Personal history of urinary tract infection    -  "nitroFURantoin macrocrystal (MACRODANTIN) 50 MG capsule  Dispense: 90 capsule; Refill: 3    Glaucoma of both eyes, unspecified glaucoma type    - REVIEW OF HEALTH MAINTENANCE PROTOCOL ORDERS    Rosacea    - REVIEW OF HEALTH MAINTENANCE PROTOCOL ORDERS  - metroNIDAZOLE (METROGEL) 0.75 % external gel  Dispense: 45 g; Refill: 3    Screen for colon cancer    - Colonoscopy Screening  Referral  - REVIEW OF HEALTH MAINTENANCE PROTOCOL ORDERS    Encounter for screening mammogram for malignant neoplasm of breast    - REVIEW OF HEALTH MAINTENANCE PROTOCOL ORDERS  - MA Screening Bilateral w/ Ran    Screening for osteoporosis    - Comprehensive metabolic panel  - Vitamin D Deficiency  - REVIEW OF HEALTH MAINTENANCE PROTOCOL ORDERS  - Comprehensive metabolic panel  - Vitamin D Deficiency    Class 1 obesity with serious comorbidity and body mass index (BMI) of 33.0 to 33.9 in adult, unspecified obesity type      Medication monitoring encounter    - Comprehensive metabolic panel  - Lipid panel reflex to direct LDL Fasting  - CBC with platelets  - CK total  - UA Macroscopic with reflex to Microscopic and Culture  - Albumin Random Urine Quantitative with Creat Ratio  - TSH with free T4 reflex  - Vitamin D Deficiency  - ESR: Erythrocyte sedimentation rate  - CRP, inflammation  - REVIEW OF HEALTH MAINTENANCE PROTOCOL ORDERS  - Comprehensive metabolic panel  - Lipid panel reflex to direct LDL Fasting  - CBC with platelets  - CK total  - UA Macroscopic with reflex to Microscopic and Culture  - Albumin Random Urine Quantitative with Creat Ratio  - TSH with free T4 reflex  - Vitamin D Deficiency  - ESR: Erythrocyte sedimentation rate  - CRP, inflammation  - UA Microscopic with Reflex to Culture      Patient has been advised of split billing requirements and indicates understanding: Yes      BMI  Estimated body mass index is 33.56 kg/m  as calculated from the following:    Height as of this encounter: 1.568 m (5' 1.75\").    " Weight as of this encounter: 82.6 kg (182 lb).   Weight management plan: diet and exercise    Counseling  Appropriate preventive services were discussed with this patient, including applicable screening as appropriate for fall prevention, nutrition, physical activity, Tobacco-use cessation, weight loss and cognition.  Checklist reviewing preventive services available has been given to the patient.  Reviewed patient's diet, addressing concerns and/or questions.   She is at risk for lack of exercise and has been provided with information to increase physical activity for the benefit of her well-being.     Work on weight loss  Regular exercise    Plan:    1) Medications: refilled    2) Labs: pending    3) Immunizations: covid / flu in fall, rsv, consider twinrix    4) Imaging/Diagnostics: colonoscopy mammogram    5) Consults: NA    30 minutes spent by myself on the date of the encounter doing chart review, history and exam, documentation and further activities per the note.    Suzanne Campo is a 68 year old, presenting for the following:  Physical (Medicare - Fasting)        6/10/2024     9:36 AM   Additional Questions   Roomed by Herb XAVIER CMA     Health Care Directive  Patient does not have a Health Care Directive or Living Will: Discussed advance care planning with patient; information given to patient to review.    Ulcerative Colitis / colon polyps / FH colon cancer - rare flare with diet changes    GERD - controlled with omeprazole    Hyperlipidemia Follow-Up - stopped statin secondary to arthralgias    Are you regularly taking any medication or supplement to lower your cholesterol?   No  Are you having muscle aches or other side effects that you think could be caused by your cholesterol lowering medication?  Yes- makes patient to sore with muscle aches and cramps.    Recent Labs   Lab Test 04/03/23  0902 02/08/22  1039   CHOL 255* 256*   HDL 57 56   * 182*   TRIG 86 92     Depression and Anxiety    How are you doing with your depression since your last visit? No change  How are you doing with your anxiety since your last visit?  No change  Are you having other symptoms that might be associated with depression or anxiety? No  Have you had a significant life event? OTHER: Just Retired :)     Do you have any concerns with your use of alcohol or other drugs? No        2/8/2022    10:21 AM 4/3/2023     8:37 AM 6/10/2024     9:28 AM   PHQ   PHQ-9 Total Score 0 0 0   Q9: Thoughts of better off dead/self-harm past 2 weeks Not at all Not at all Not at all         10/27/2020     1:19 PM 2/8/2022    10:21 AM 6/10/2024     9:29 AM   KRISTI-7 SCORE   Total Score   0 (minimal anxiety)   Total Score 0 0 0         6/10/2024     9:28 AM   Last PHQ-9   1.  Little interest or pleasure in doing things 0   2.  Feeling down, depressed, or hopeless 0   3.  Trouble falling or staying asleep, or sleeping too much 0   4.  Feeling tired or having little energy 0   5.  Poor appetite or overeating 0   6.  Feeling bad about yourself 0   7.  Trouble concentrating 0   8.  Moving slowly or restless 0   Q9: Thoughts of better off dead/self-harm past 2 weeks 0   PHQ-9 Total Score 0         6/10/2024     9:29 AM   KRISTI-7    1. Feeling nervous, anxious, or on edge 0   2. Not being able to stop or control worrying 0   3. Worrying too much about different things 0   4. Trouble relaxing 0   5. Being so restless that it is hard to sit still 0   6. Becoming easily annoyed or irritable 0   7. Feeling afraid, as if something awful might happen 0   KRISTI-7 Total Score 0   If you checked any problems, how difficult have they made it for you to do your work, take care of things at home, or get along with other people? Not difficult at all   Suicide Assessment Five-step Evaluation and Treatment (SAFE-T)      OA - left knee pain - arthralgias     Osteopenia - recent DEXA    Glaucoma - follows with ophthalmology    Rosacea - controlled overall        6/10/2024    General Health   How would you rate your overall physical health? Good   Feel stress (tense, anxious, or unable to sleep) Not at all         6/10/2024   Nutrition   Diet: Regular (no restrictions)         6/10/2024   Exercise   Days per week of moderate/strenous exercise 3 days   Average minutes spent exercising at this level 20 min         6/10/2024   Social Factors   Frequency of gathering with friends or relatives More than three times a week   Worry food won't last until get money to buy more No   Food not last or not have enough money for food? No   Do you have housing?  Yes   Are you worried about losing your housing? No   Lack of transportation? No   Unable to get utilities (heat,electricity)? No         6/10/2024   Fall Risk   Fallen 2 or more times in the past year? No   Trouble with walking or balance? No          6/10/2024   Activities of Daily Living- Home Safety   Needs help with the following daily activites None of the above   Safety concerns in the home None of the above         6/10/2024   Dental   Dentist two times every year? Yes         6/10/2024   Hearing Screening   Hearing concerns? None of the above         6/10/2024   Driving Risk Screening   Patient/family members have concerns about driving No         6/10/2024   General Alertness/Fatigue Screening   Have you been more tired than usual lately? No         6/10/2024   Urinary Incontinence Screening   Bothered by leaking urine in past 6 months No         6/10/2024   TB Screening   Were you born outside of the [place]? No         6/10/2024   Substance Use   Alcohol more than 3/day or more than 7/wk No   Do you have a current opioid prescription? No   How severe/bad is pain from 1 to 10? 0/10 (No Pain)   Do you use any other substances recreationally? No     Social History     Tobacco Use    Smoking status: Never    Smokeless tobacco: Never   Vaping Use    Vaping status: Never Used   Substance Use Topics    Alcohol use: Yes     Alcohol/week: 7.0  standard drinks of alcohol     Types: 7 Standard drinks or equivalent per week     Comment: 7 glass of wine per week    Drug use: No           2023   LAST FHS-7 RESULTS   1st degree relative breast or ovarian cancer No   Any relative bilateral breast cancer No   Any male have breast cancer No   Any ONE woman have BOTH breast AND ovarian cancer No   Any woman with breast cancer before 50yrs No   2 or more relatives with breast AND/OR ovarian cancer No   2 or more relatives with breast AND/OR bowel cancer Yes          Annual mammogram    History of abnormal Pap smear: No - age 65 or older with adequate negative prior screening test results (3 consecutive negative cytology results, 2 consecutive negative cotesting results, or 2 consecutive negative HrHPV test results within 10 years, with the most recent test occurring within the recommended screening interval for the test used)        2013    12:00 AM 10/24/2011    11:37 AM 11/3/2010    12:00 AM   PAP / HPV   PAP (Historical) NIL  NIL  NIL      ASCVD Risk   The 10-year ASCVD risk score ([facility] CAN, et al., 2019) is: 6.9%    Values used to calculate the score:      Age: 68 years      Sex: Female      Is Non- : No      Diabetic: No      Tobacco smoker: No      Systolic Blood Pressure: 122 mmHg      Is BP treated: No      HDL Cholesterol: 57 mg/dL      Total Cholesterol: 255 mg/dL    Fracture Risk Assessment Tool  Link to Frax Calculator  Use the information below to complete the Frax calculator  : 1955  Sex: female  Weight (kg): 82.6 kg (actual weight)  Height (cm): 156.8 cm  Previous Fragility Fracture:  No  History of parent with fractured hip:  No  Current Smoking:  No  Patient has been on glucocorticoids for more than 3 months (5mg/day or more): No  Rheumatoid Arthritis on Problem List:  No  Secondary Osteoporosis on Problem List:  No  Consumes 3 or more units of alcohol per day: No  Femoral Neck BMD (g/cm2)             Reviewed and updated as needed this visit by Provider                  Current providers sharing in care for this patient include:  Patient Care Team:  Louis Nixon MD as PCP - General  Louis Nixon MD as Assigned PCP    The following health maintenance items are reviewed in Epic and correct as of today:  Health Maintenance   Topic Date Due    RSV VACCINE (Pregnancy & 60+) (1 - 1-dose 60+ series) Never done    COVID-19 Vaccine (4 - 2023-24 season) 09/01/2023    COLORECTAL CANCER SCREENING  12/18/2023    LIPID  04/03/2024    MAMMO SCREENING  05/09/2024    INFLUENZA VACCINE (Season Ended) 09/01/2024    PHQ-9  12/10/2024    MEDICARE ANNUAL WELLNESS VISIT  06/10/2025    ANNUAL REVIEW OF HM ORDERS  06/10/2025    FALL RISK ASSESSMENT  06/10/2025    GLUCOSE  04/03/2026    DEXA  05/05/2027    ADVANCE CARE PLANNING  06/10/2029    DTAP/TDAP/TD IMMUNIZATION (3 - Td or Tdap) 12/15/2032    HEPATITIS C SCREENING  Completed    DEPRESSION ACTION PLAN  Completed    Pneumococcal Vaccine: 65+ Years  Completed    ZOSTER IMMUNIZATION  Completed    IPV IMMUNIZATION  Aged Out    HPV IMMUNIZATION  Aged Out    MENINGITIS IMMUNIZATION  Aged Out    RSV MONOCLONAL ANTIBODY  Aged Out    PAP  Discontinued     Patient Active Problem List   Diagnosis    Rosacea    Ulcerative colitis (H)    Major depressive disorder, single episode, moderate (H)    Osteopenia    Hyperlipidemia LDL goal <130    Health Care Home    Advanced directives, counseling/discussion    OA (osteoarthritis)    Family history of colon cancer    GERD (gastroesophageal reflux disease)    Colon polyp    Ulcerative rectosigmoiditis without complication (H)    Glaucoma    Class 1 obesity with serious comorbidity and body mass index (BMI) of 33.0 to 33.9 in adult, unspecified obesity type       Past Medical History:   Diagnosis Date    Colon polyp     Family history of colon cancer     Mom    GERD (gastroesophageal reflux disease) 2012    Glaucoma     [facility] - bilateral     Hyperlipidemia LDL goal <130     Major depressive disorder, single episode, moderate (H) 2009    OA (osteoarthritis)     Lt hip moderate    Osteopenia 2009    Rosacea     Ulcerative colitis, unspecified 1985    UC - rare issues    Unspecified hemorrhoids without mention of complication 2007       Past Surgical History:   Procedure Laterality Date    COLONOSCOPY  2012    due 5 yrs - 4 mm polyp - FH - diverticulosis    COLONOSCOPY  2016    due 5 ytrs - FH    HC REVISE MEDIAN N/CARPAL TUNNEL SURG  2002    bilateral - dr acuna    SURGICAL HISTORY OF -   2007    Dr Perales - Lt hip CARMELINA    SURGICAL HISTORY OF -   2009    Dr Perales - Rt Hip CARMELINA    [facility] COLONOSCOPY THRU STOMA, DIAGNOSTIC  2012    colon polyp, diverticulosis, hemorrhoids - due 5-10 Yrs       Current Outpatient Medications   Medication Sig Dispense Refill    FLUoxetine (PROZAC) 40 MG capsule Take 1 capsule (40 mg) by mouth daily 90 capsule 3    metroNIDAZOLE (METROGEL) 0.75 % external gel Apply topically 2 times daily 45 g 3    nitroFURantoin macrocrystal (MACRODANTIN) 50 MG capsule Take 1 capsule (50 mg) by mouth at bedtime 90 capsule 3    omeprazole (PRILOSEC) 20 MG DR capsule Take 1 capsule (20 mg) by mouth daily 60 capsule 3       Allergies   Allergen Reactions    Sulfa Antibiotics Rash     Rash       Family History   Problem Relation Age of Onset    Lung Cancer Father          Lung CA - SMOKER    Lipids Sister     Lipids Brother     Macular Degeneration Brother     Cerebrovascular Disease Maternal Grandmother     C.A.D. Maternal Grandfather     Prostate Cancer Maternal Grandfather     Alzheimer Disease Paternal Grandmother     Prostate Cancer Paternal Grandfather     Eye Disorder Mother         glaucoma    Cancer - colorectal Mother         AGE 71 WITH METS liver cancer.  Oct. 2006    Eye Disorder Sister         histoplasmosis    Hyperlipidemia Sister     Eye Disorder Brother          histoplasmosis    Hyperlipidemia Brother     Hyperlipidemia Brother     Hyperlipidemia Sister        Social History     Socioeconomic History    Marital status:      Spouse name: Compa    Number of children: 5    Years of education: 12    Highest education level: None   Occupational History    Occupation:      Employer: NONE    Tobacco Use    Smoking status: Never    Smokeless tobacco: Never   Vaping Use    Vaping status: Never Used   Substance and Sexual Activity    Alcohol use: Yes     Alcohol/week: 7.0 standard drinks of alcohol     Types: 7 Standard drinks or equivalent per week     Comment: 7 glass of wine per week    Drug use: No    Sexual activity: Yes     Partners: Male   Other Topics Concern    Caffeine Concern Yes     Comment: 2 cups daily    Exercise Yes     Comment: 3+/week    Seat Belt Yes    Parent/sibling w/ CABG, MI or angioplasty before 65F 55M? No     Social Determinants of Health     Financial Resource Strain: Low Risk  (6/10/2024)    Financial Resource Strain     Within the past 12 months, have you or your family members you live with been unable to get utilities (heat, electricity) when it was really needed?: No   Food Insecurity: Low Risk  (6/10/2024)    Food Insecurity     Within the past 12 months, did you worry that your food would run out before you got money to buy more?: No     Within the past 12 months, did the food you bought just not last and you didn t have money to get more?: No   Transportation Needs: Low Risk  (6/10/2024)    Transportation Needs     Within the past 12 months, has lack of transportation kept you from medical appointments, getting your medicines, non-medical meetings or appointments, work, or from getting things that you need?: No   Physical Activity: Insufficiently Active (6/10/2024)    Exercise Vital Sign     Days of Exercise per Week: 3 days     Minutes of Exercise per Session: 20 min   Stress: No Stress Concern Present (6/10/2024)    [employer]  "of Occupational Health - Occupational Stress Questionnaire     Feeling of Stress : Not at all   Social Connections: Unknown (6/10/2024)    Social Connection and Isolation Panel [NHANES]     Frequency of Social Gatherings with Friends and Family: More than three times a week   Interpersonal Safety: Low Risk  (6/10/2024)    Interpersonal Safety     Do you feel physically and emotionally safe where you currently live?: Yes     Within the past 12 months, have you been hit, slapped, kicked or otherwise physically hurt by someone?: No     Within the past 12 months, have you been humiliated or emotionally abused in other ways by your partner or ex-partner?: No   Housing Stability: Low Risk  (6/10/2024)    Housing Stability     Do you have housing? : Yes     Are you worried about losing your housing?: No     Colonoscopy: Due  FIT / Cologuard: NA  Pap: NA  Mammogram: due  DEXA: due 5/2025      Review of Systems  CONSTITUTIONAL: NEGATIVE for fever, chills, change in weight  INTEGUMENTARY/SKIN: NEGATIVE for worrisome rashes, moles or lesions  EYES: NEGATIVE for vision changes or irritation  ENT/MOUTH: NEGATIVE for ear, mouth and throat problems  RESP: NEGATIVE for significant cough or SOB  BREAST: NEGATIVE for masses, tenderness or discharge  CV: NEGATIVE for chest pain, palpitations or peripheral edema  GI: NEGATIVE for nausea, abdominal pain, heartburn, or change in bowel habits  : NEGATIVE for frequency, dysuria, or hematuria  MUSCULOSKELETAL: NEGATIVE for significant arthralgias or myalgia  NEURO: NEGATIVE for weakness, dizziness or paresthesias  ENDOCRINE: NEGATIVE for temperature intolerance, skin/hair changes  HEME: NEGATIVE for bleeding problems  PSYCHIATRIC: NEGATIVE for changes in mood or affect     Objective    Exam  /74   Pulse 80   Temp 97.8  F (36.6  C) (Tympanic)   Resp 13   Ht 1.568 m (5' 1.75\")   Wt 82.6 kg (182 lb)   LMP 10/20/2003   SpO2 99%   BMI 33.56 kg/m     Estimated body mass index is " "33.56 kg/m  as calculated from the following:    Height as of this encounter: 1.568 m (5' 1.75\").    Weight as of this encounter: 82.6 kg (182 lb).    Physical Exam  GENERAL: alert and no distress  EYES: Eyes grossly normal to inspection, PERRL and conjunctivae and sclerae normal  HENT: ear canals and TM's normal, nose and mouth without ulcers or lesions  NECK: no adenopathy, no asymmetry, masses, or scars  RESP: lungs clear to auscultation - no rales, rhonchi or wheezes  BREAST: pt declines  CV: regular rate and rhythm, normal S1 S2, no S3 or S4, 2/3 systolic murmur, click or rub, no peripheral edema  ABDOMEN: soft, nontender, no hepatosplenomegaly, no masses and bowel sounds normal   (female): pt declines  RECTAL: pt declines  MS: no gross musculoskeletal defects noted, no edema  SKIN: no suspicious lesions or rashes  NEURO: Normal strength and tone, mentation intact and speech normal  PSYCH: mentation appears normal, affect normal/bright         6/10/2024   Mini Cog   Clock Draw Score 2 Normal   3 Item Recall 3 objects recalled   Mini Cog Total Score 5     Signed Electronically by:              Louis Nixon MD, FAAFP     [facility]  [address] [place] [id number]  [email or web address]   Office: [phone]  Fax: [phone]         "

## 2024-06-17 PROBLEM — Z71.89 ADVANCED DIRECTIVES, COUNSELING/DISCUSSION: Status: RESOLVED | Noted: 2017-07-06 | Resolved: 2024-06-17

## 2024-06-19 DIAGNOSIS — E78.5 HYPERLIPIDEMIA LDL GOAL <130: Primary | ICD-10-CM

## 2024-06-19 DIAGNOSIS — Z51.81 MEDICATION MONITORING ENCOUNTER: ICD-10-CM

## 2024-06-19 RX ORDER — EZETIMIBE 10 MG/1
10 TABLET ORAL DAILY
Qty: 90 TABLET | Refills: 3 | Status: SHIPPED | OUTPATIENT
Start: 2024-06-19

## 2024-07-15 ENCOUNTER — TELEPHONE (OUTPATIENT)
Dept: GASTROENTEROLOGY | Facility: CLINIC | Age: 69
End: 2024-07-15
Payer: COMMERCIAL

## 2024-07-15 NOTE — LETTER
7/15/2024      Kristin Jimenez  [address] [facility] [address]        Standard Miralax Bowel Prep   Prep instructions for your colonoscopy   For prep questions, please call: [facility] - [address] [facility], [place], [place] [address] - [phone] option 4    Please read these instructions carefully at least 7 days prior to your colonoscopy procedure. Be sure to follow all directions completely. The inside of your colon must be clean to allow for a complete examination for the presence of any growths, polyps, and/or abnormalities, as well as their biopsy or removal. A number of tips are included in order to make this part of the procedure as comfortable as possible.    Getting ready   Purchase the following items over-the-counter/off the shelf at the drug store:    Four (4) - Dulcolax laxative (Bisacodyl) 5mg tablets (Do not use Dulcolax stool softener)   8.3 ounce bottle of Miralax powder (ClearLAX, SmoothLAX, PowderLAX)  64 ounces of Gatorade or similar sports drink. Not red or purple. (Pedialyte, Propel, Gatorade G2/Zero, Powerade, Powerade Zero)   10 ounce bottle of clear Magnesium Citrate    A nurse will call you to go over your appointment details and prep instructions. Not completing the nurse call could result with your appointment being cancelled.    You must arrange for an adult to drive you home after your exam. Your colonoscopy cannot be done unless you have a ride. If you need to use public transportation, someone must ride with you and stay with you for up to 24 hours.       7 days before procedure     Consult with your prescribing provider about stopping any:     Diabetic/Weight Loss Injectable Medication GLP-1 agonist (such as Exenatide (Byetta, Bydureon),  Mounjaro (Tirzepatide). Ozempic (Semaglutide). Semaglutide. Symlin (Pamlintide), Tanzeum (Albiglutide). Tirzepatide-Weight Management (Zepbound), Trulicity (Dulaglutide), Victoza (Saxenda, Liraglutide), Wegovy (Semaglutide) please  follow below guidelines for holding:    For weekly injection HOLD 7 days before procedure.  For once or twice a day injection HOLD the day before procedure and day of procedure.  For oral, daily dosing (Rybelsus) HOLD 7 days before procedure.    Blood thinning and/or anti platelet medications: such as Coumadin, Plavix, Xarelto, Eliquis, Lovenox or others, these medications may need to be stopped temporarily before your procedure.     If you take insulin for diabetes, ask your prescribing provider for instructions on how to take this medication while preparing for a colonoscopy.     NSAIDs medications such as Sulindac, Celebrex, Mobic, Relafen or others may need to be stopped before the procedure. This will be discussed during nurse review call, or you can reach out to your prescribing provider.      Stop taking iron (ferrous sulfate), multivitamins that contain iron, and/or fiber supplements (Metamucil, Benefiber, Psyllium husk powder, Fibercon, etc.).      Stop eating whole kernel corn, popcorn, nuts, and foods that contain seeds. These can stay in the colon for many days, and they can clog up the colonoscope.       3 days before procedure     Begin a low-fiber diet (see examples below). No Olestra (a fat substitute).    Consume no more than 10-15 grams of fiber each day.     It is important to stay hydrated. Drink at least eight 8-ounce glasses of water a day.      LOW FIBER DIET   You can have:   Do not have:    Starches: White bread, rolls, biscuits, croissants, [place] toast, white flour tortillas, waffles, pancakes, Australian toast; white rice, noodles, pasta, macaroni; cooked and peeled potatoes; plain crackers, saltines; cooked farina or cream of rice; puffed rice, corn flakes, Rice Krispies, Special K      Vegetables: tender cooked and canned, vegetable broths     Fruits and fruit juices: Strained fruit juice, canned fruit without seeds or skin (not pineapple), applesauce, pear sauce, ripe bananas, melons (not  watermelon)     Milk products: Milk (plain or flavored), cheese, cottage cheese, yogurt (no berries), custard, ice cream       Proteins: Tender, well-cooked ground beef, lamb, veal, ham, pork, chicken, turkey, fish or organ meat, Tofu, eggs, creamy peanut butter      Fats and condiments:  Margarine, butter, oils, mayonnaise, sour cream, salad dressing, plain gravy; spices, cooked herbs; sugar, clear jelly, honey, syrup      Snacks, sweets and drinks: Pretzels, hard candy; plain cakes and cookies (no nuts or seeds); gelatin, plain pudding, sherbet, Popsicles; coffee, tea, carbonated ( fizzy ) drinks  Starches: Breads or rolls that contain nuts, seeds or fruit; whole wheat or whole grain breads that contain more than 2 grams of fiber per serving; cornbread; corn or whole wheat tortillas; potatoes with skin; brown rice, wild rice, quinoa, kasha (buckwheat), and oatmeal      Vegetables: Any raw or steamed vegetables; vegetables with seeds; corn in any form      Fruits and fruit juices: Prunes, prune juice, raisins and other dried fruits, berries and other fruits with seeds, canned pineapple juices with pulp such as orange, grapefruit, pineapple or tomato juice     Milk products: Any yogurt with nuts, seeds or berries      Proteins: Tough, fibrous meats with gristle; cooked dried beans, peas or lentils; crunchy peanut butter     Fats and condiments: Pickles, olives, relish, horseradish; jam, marmalade, preserves      Snacks, sweets and drinks: Popcorn, nuts, seeds, granola, coconut, candies made with nuts or seeds; all desserts that contain nuts, seeds, raisins and other dried fruits, coconut, whole grains or bran.       1 day before procedure       Start a clear liquid diet (see examples below). Do not eat any solid food.      Drink at least eight to ten 8-ounce glasses of water throughout the day. ? ? ? ? ? ? ? ?    Stop taking NSAIDs pain relievers, such as Advil, Ibuprofen, Motrin, etc. You may take  Tylenol.      CLEAR LIQUID DIET:  You can have: Do not have:    Water, tea, coffee (no milk or cream)   Soda pop, Gatorade (not red or purple)   Coconut water   Jell-O, Popsicles (no milk or fruit pieces - not red or purple)   Fat-free soup broth or bouillon   Plain hard candy, such as clear life savers (not red or purple)   Clear juices and fruit-flavored drinks, such as apple juice, white grape juice, Hi-C, and Abelardo-Aid (not red or purple)  Milk or milk products such as ice cream, malts or shakes, or coffee creamer   Red or purple drinks of any kind such as cranberry juice, grape juice or Abelardo-Aid. Avoid red or purple Jell-O, Popsicles, sorbet, sherbet and candy   Juices with pulp such as orange, grapefruit, pineapple or tomato juice   Cream soups of any kind   Alcohol and beer   Protein drinks or protein powder     Step 1     At 4 PM, take 2 Dulcolax (Bisacodyl) tablets.   At 5 PM, mix the entire bottle of Miralax with 64 ounces of Gatorade in a pitcher and stir to dissolve the powder. Start drinking one 8-ounce glass of the Miralax and Gatorade mixture every 15 minutes until the pitcher is HALF empty (about 4 glasses).  Drink each glass quickly. Store the rest in the refrigerator.   Continue to drink clear liquids.    Step 2     At 10 PM, take 2 Dulcolax (Bisacodyl) tablets  At 10 PM start drinking the remainder of the Miralax and Gatorade mixture. Drink one 8-ounce glass of Miralax and Gatorade mixture every 15 minutes until the pitcher is empty (about 4 glasses). Drink each glass quickly.     Step 3     If you arrive for your procedure BEFORE 11 AM:  6 hours prior to your scheduled arrival to the endoscopy unit, drink 10 ounces of clear Magnesium Citrate.    If you arrive for your procedure AFTER 11 AM:  At 6 AM on the day of the exam drink 10 ounces of clear Magnesium Citrate.       Reminders While Drinking Laxatives:     After you start drinking the solution, stay near a toilet. You may have watery stools  (diarrhea), mild cramping, bloating, and nausea. You may want to use Vaseline on the skin around your anus after each bowel movement or use wet wipes to prevent irritation. Bowel movements will be liquid and dark in color at first and then should turn clear yellow in color.      Some find it easier to drink the Miralax and Gatorade mixture when it is chilled. Do not add ice as this will dilute the laxative. Drinking from a straw can be helpful to drink the liquid faster.     If you have nausea or vomiting during drinking the solution, rinse your mouth with water and take a 15-30 minute break and then continue drinking solution.       Day of procedure     2 hours before your arrival time stop drinking all liquids, including water.   Do not smoke or swallow anything, including water or gum for at least 2 hours before your arrival time. This is a safety issue. Your procedure could be cancelled if you do not follow directions.  No chewing tobacco 6 hours prior to procedure arrival time.     You may take your necessary morning medications with sips of water (4 ounces).   Do not take diabetes medicine by mouth until after your exam.  If you have asthma, bring your inhalers.  Please perform your nebulizer treatments and airway clearance therapy in the morning prior to the procedure (if applicable).    Arrive with a responsible adult who can drive you home and stay with you for up to 24 hours. The medications used during the procedure will make you sleepy, so you won't be able to drive yourself home.   You cannot use public transportation, ride-share services, or non-medical taxi services without a responsible caregiver. Medical transport services are okay, but a caregiver must be there to receive you at your destination.  Please check in with your  when you arrive. Drivers should stay on campus.    Expect to be at the procedure center for about 1.5-2.5 hours.    Do not wear jewelry (i.e. earrings, rings, necklaces,  watches, etc.). Leave your purse, billfold, credit cards, and other valuables at home.      Bring insurance card and ID.       Answers to Commonly Asked Questions     How soon can I eat after the procedure?  You may resume your normal diet when you feel ready, unless advised otherwise by the doctor performing your procedure. We recommend starting with a light meal.   Do not drink alcohol for 24 hours after your procedure.  You may resume normal activities (work, exercise, etc.) after 24 hours.    How might I feel after the procedure?  It is normal to feel bloated and gassy after your procedure. Walking will help move the air through your colon. You can take non-aspirin pain relievers that contain acetaminophen (Tylenol).  If you are having sedation, we require a responsible adult to take you home for your safety. The sedation medicines used to relax you during the procedure can impair your judgement and reaction time, and make you forgetful and possibly a little unsteady.  Do not drive, make any important decisions, or sign any legal documents for 24 hours after your procedure.    When will I get my test results?  You should have your procedure results and any lab results (if applicable) by letter, [employer] message, or phone call within 2 weeks. If you have any questions, please call the doctor that referred you for the procedure.    How do I know if my colon is cleaned out?   After completing the bowel prep, your bowel movements should be all liquid and yellow. Your bowel movements will look similar to urine in the toilet. If there are pieces of stool (poop) in the toilet, or if you can't see to the bottom of the toilet, please call our office for advice. Call [phone] and ask to speak with a nurse.    Why is the Miralax bowel prep taken in several steps?   The stool is flushed out by a large wave of fluid going through the colon. Just sipping a large volume of the solution will not achieve the desired result.  Studies have shown that two smaller waves (or more in some cases) are better than one large one.      Why do I need to drink the magnesium citrate so close to the procedure arrival time?   The intestine continues to produce mucus and waste. Longer intervals between the prep and the exam can lead to less than desired results. However, the stomach must be empty at the time of the exam in order to allow safe sedation. Therefore, there should be nothing by mouth 2 hours before the exam is started.    What if I need to cancel or reschedule my procedure?  Contact our endoscopy scheduling team at [phone], option 2. Monday through Friday, 7:00am-5:00pm.

## 2024-07-15 NOTE — TELEPHONE ENCOUNTER
"Endoscopy Scheduling Screen    Have you had a positive Covid test in the last 14 days?  No    What is your communication preference for Instructions and/or Bowel Prep?   Mail/USPS    What insurance is in the chart?  Other:  [facility]    Ordering/Referring Provider:     YENNIFER KILLIAN      (If ordering provider performs procedure, schedule with ordering provider unless otherwise instructed. )    BMI: Estimated body mass index is 33.56 kg/m  as calculated from the following:    Height as of 6/10/24: 1.568 m (5' 1.75\").    Weight as of 6/10/24: 82.6 kg (182 lb).     Sedation Ordered  moderate sedation.   If patient BMI > 50 do not schedule in [facility].    If patient BMI > 45 do not schedule at [facility].    Are you taking methadone or Suboxone?  No    Have you had difficulties, pain, or discomfort during past endoscopy procedures?  No    Are you taking any prescription medications for pain 3 or more times per week?   NO, No RN review required.    Do you have a history of malignant hyperthermia?  No    (Females) Are you currently pregnant?   No     Have you been diagnosed or told you have pulmonary hypertension?   No    Do you have an LVAD?  No    Have you been told you have moderate to severe sleep apnea?  No    Have you been told you have COPD, asthma, or any other lung disease?  No    Do you have any heart conditions?  No     Have you ever had or are you waiting for an organ transplant?  No. Continue scheduling, no site restrictions.    Have you had a stroke or transient ischemic attack (TIA aka \"mini stroke\" in the last 6 months?   No    Have you been diagnosed with or been told you have cirrhosis of the liver?   No    Are you currently on dialysis?   No    Do you need assistance transferring?   No    BMI: Estimated body mass index is 33.56 kg/m  as calculated from the following:    Height as of 6/10/24: 1.568 m (5' 1.75\").    Weight as of 6/10/24: 82.6 kg (182 lb).     Is patients BMI > 40 and scheduling location [facility]?  No    Do you " take an injectable medication for weight loss or diabetes (excluding insulin)?  No    Do you take the medication Naltrexone?  No    Do you take blood thinners?  No       Prep   Are you currently on dialysis or do you have chronic kidney disease?  No    Do you have a diagnosis of diabetes?  No    Do you have a diagnosis of cystic fibrosis (CF)?  No    On a regular basis do you go 3 -5 days between bowel movements?  No    BMI > 40?  No    Preferred Pharmacy:    [id number] - [place], [place] - [address]  [address] [facility] [address]  Phone: [phone] Fax: [phone]      Final Scheduling Details     Procedure scheduled  Colonoscopy    Surgeon:  JANIE     Date of procedure:  09/30/2024     Pre-OP / PAC:   No - Not required for this site.    Location  [facility] - Per order.    Sedation   Moderate Sedation - Patient preference.      Patient Reminders:   You will receive a call from a Nurse to review instructions and health history.  This assessment must be completed prior to your procedure.  Failure to complete the Nurse assessment may result in the procedure being cancelled.      On the day of your procedure, please designate an adult(s) who can drive you home stay with you for the next 24 hours. The medicines used in the exam will make you sleepy. You will not be able to drive.      You cannot take public transportation, ride share services, or non-medical taxi service without a responsible caregiver.  Medical transport services are allowed with the requirement that a responsible caregiver will receive you at your destination.  We require that drivers and caregivers are confirmed prior to your procedure.

## 2024-08-10 ENCOUNTER — HEALTH MAINTENANCE LETTER (OUTPATIENT)
Age: 69
End: 2024-08-10

## 2024-09-19 ENCOUNTER — TELEPHONE (OUTPATIENT)
Dept: GASTROENTEROLOGY | Facility: CLINIC | Age: 69
End: 2024-09-19
Payer: COMMERCIAL

## 2024-09-19 NOTE — TELEPHONE ENCOUNTER
Pre visit planning completed.      Procedure details:    Patient scheduled for Colonoscopy on 09.30.2024.     Arrival time: 0915. Procedure time 1000    Facility location: [facility]; [place] [address]. Check in location: Main entrance, door #1 on the North side of the building under roundabout awning. DO NOT GO TO SURGERY/ED ENTRANCE.     Sedation type: Conscious sedation     Pre op exam needed? No.    Indication for procedure:    Ulcerative colitis with complication, unspecified location (H)   Ulcerative rectosigmoiditis without complication (H)   Polyp of colon, unspecified part of colon, unspecified type   Family history of colon cancer   Screen for colon cancer         Chart review:     Electronic implanted devices? No    Recent diagnosis of diverticulitis within the last 6 weeks? No      Medication review:    Diabetic? No    Anticoagulants? No    Weight loss medication/injectable? No GLP-1 medication per patient's medication list.  RN will verify with pre-assessment call.    Other medication HOLDING recommendations:  N/A      Prep for procedure:     Bowel prep recommendation: Standard Miralax  Due to: standard bowel prep.    Prep instructions sent via letter by CRC team         Judy Barakat RN  Endoscopy Procedure Pre Assessment RN  [phone] option 2

## 2024-09-19 NOTE — TELEPHONE ENCOUNTER
Pre assessment completed for upcoming procedure.   (Please see previous telephone encounter notes for complete details)    Procedure details:    Arrival time and facility location reviewed.    Pre op exam needed? No.    Designated  policy reviewed. Instructed to have someone stay 6  hours post procedure.       Medication review:    Medications reviewed. Please see supporting documentation below. Holding recommendations discussed (if applicable).       Prep for procedure:     Procedure prep instructions reviewed.        Any additional information needed:  N/A      Patient  verbalized understanding and had no questions or concerns at this time.      Judy Cox RN  Endoscopy Procedure Pre Assessment   [phone] option 2

## 2024-09-30 ENCOUNTER — HOSPITAL ENCOUNTER (OUTPATIENT)
Facility: CLINIC | Age: 69
Discharge: HOME | End: 2024-09-30
Attending: INTERNAL MEDICINE | Admitting: INTERNAL MEDICINE
Payer: COMMERCIAL

## 2024-09-30 VITALS
OXYGEN SATURATION: 93 % | WEIGHT: 182 LBS | HEIGHT: 62 IN | DIASTOLIC BLOOD PRESSURE: 68 MMHG | HEART RATE: 68 BPM | RESPIRATION RATE: 12 BRPM | SYSTOLIC BLOOD PRESSURE: 111 MMHG | BODY MASS INDEX: 33.49 KG/M2

## 2024-09-30 LAB — COLONOSCOPY: NORMAL

## 2024-09-30 PROCEDURE — 88305 TISSUE EXAM BY PATHOLOGIST: CPT | Mod: TC | Performed by: INTERNAL MEDICINE

## 2024-09-30 PROCEDURE — G0500 MOD SEDAT ENDO SERVICE >5YRS: HCPCS | Performed by: INTERNAL MEDICINE

## 2024-09-30 PROCEDURE — 45385 COLONOSCOPY W/LESION REMOVAL: CPT | Performed by: INTERNAL MEDICINE

## 2024-09-30 PROCEDURE — 99153 MOD SED SAME PHYS/QHP EA: CPT | Performed by: INTERNAL MEDICINE

## 2024-09-30 PROCEDURE — 250N000011 HC RX IP 250 OP 636: Performed by: INTERNAL MEDICINE

## 2024-09-30 PROCEDURE — 45380 COLONOSCOPY AND BIOPSY: CPT | Mod: 59 | Performed by: INTERNAL MEDICINE

## 2024-09-30 RX ORDER — DIPHENHYDRAMINE HYDROCHLORIDE 50 MG/ML
25-50 INJECTION INTRAMUSCULAR; INTRAVENOUS
Status: DISCONTINUED | OUTPATIENT
Start: 2024-09-30 | End: 2024-09-30 | Stop reason: HOSPADM

## 2024-09-30 RX ORDER — NALOXONE HYDROCHLORIDE 0.4 MG/ML
0.2 INJECTION, SOLUTION INTRAMUSCULAR; INTRAVENOUS; SUBCUTANEOUS
Status: DISCONTINUED | OUTPATIENT
Start: 2024-09-30 | End: 2024-09-30 | Stop reason: HOSPADM

## 2024-09-30 RX ORDER — ONDANSETRON 4 MG/1
4 TABLET, ORALLY DISINTEGRATING ORAL EVERY 6 HOURS PRN
Status: DISCONTINUED | OUTPATIENT
Start: 2024-09-30 | End: 2024-09-30 | Stop reason: HOSPADM

## 2024-09-30 RX ORDER — EPINEPHRINE 1 MG/ML
0.1 INJECTION, SOLUTION INTRAMUSCULAR; SUBCUTANEOUS
Status: DISCONTINUED | OUTPATIENT
Start: 2024-09-30 | End: 2024-09-30 | Stop reason: HOSPADM

## 2024-09-30 RX ORDER — FLUMAZENIL 0.1 MG/ML
0.2 INJECTION, SOLUTION INTRAVENOUS
Status: DISCONTINUED | OUTPATIENT
Start: 2024-09-30 | End: 2024-09-30 | Stop reason: HOSPADM

## 2024-09-30 RX ORDER — ATROPINE SULFATE 0.1 MG/ML
1 INJECTION INTRAVENOUS
Status: DISCONTINUED | OUTPATIENT
Start: 2024-09-30 | End: 2024-09-30 | Stop reason: HOSPADM

## 2024-09-30 RX ORDER — FENTANYL CITRATE 50 UG/ML
50-100 INJECTION, SOLUTION INTRAMUSCULAR; INTRAVENOUS EVERY 5 MIN PRN
Status: DISCONTINUED | OUTPATIENT
Start: 2024-09-30 | End: 2024-09-30 | Stop reason: HOSPADM

## 2024-09-30 RX ORDER — ONDANSETRON 2 MG/ML
4 INJECTION INTRAMUSCULAR; INTRAVENOUS
Status: COMPLETED | OUTPATIENT
Start: 2024-09-30 | End: 2024-09-30

## 2024-09-30 RX ORDER — ONDANSETRON 2 MG/ML
4 INJECTION INTRAMUSCULAR; INTRAVENOUS EVERY 6 HOURS PRN
Status: DISCONTINUED | OUTPATIENT
Start: 2024-09-30 | End: 2024-09-30 | Stop reason: HOSPADM

## 2024-09-30 RX ORDER — NALOXONE HYDROCHLORIDE 0.4 MG/ML
0.4 INJECTION, SOLUTION INTRAMUSCULAR; INTRAVENOUS; SUBCUTANEOUS
Status: DISCONTINUED | OUTPATIENT
Start: 2024-09-30 | End: 2024-09-30 | Stop reason: HOSPADM

## 2024-09-30 RX ORDER — LIDOCAINE 40 MG/G
CREAM TOPICAL
Status: DISCONTINUED | OUTPATIENT
Start: 2024-09-30 | End: 2024-09-30 | Stop reason: HOSPADM

## 2024-09-30 RX ORDER — PROCHLORPERAZINE MALEATE 5 MG/1
5 TABLET ORAL EVERY 6 HOURS PRN
Status: DISCONTINUED | OUTPATIENT
Start: 2024-09-30 | End: 2024-09-30 | Stop reason: HOSPADM

## 2024-09-30 RX ORDER — SIMETHICONE 40MG/0.6ML
133 SUSPENSION, DROPS(FINAL DOSAGE FORM)(ML) ORAL
Status: DISCONTINUED | OUTPATIENT
Start: 2024-09-30 | End: 2024-09-30 | Stop reason: HOSPADM

## 2024-09-30 RX ADMIN — MIDAZOLAM 2 MG: 1 INJECTION INTRAMUSCULAR; INTRAVENOUS at 09:41

## 2024-09-30 RX ADMIN — ONDANSETRON 4 MG: 2 INJECTION INTRAMUSCULAR; INTRAVENOUS at 09:34

## 2024-09-30 RX ADMIN — FENTANYL CITRATE 50 MCG: 50 INJECTION, SOLUTION INTRAMUSCULAR; INTRAVENOUS at 09:47

## 2024-09-30 RX ADMIN — FENTANYL CITRATE 50 MCG: 50 INJECTION, SOLUTION INTRAMUSCULAR; INTRAVENOUS at 09:41

## 2024-09-30 ASSESSMENT — ACTIVITIES OF DAILY LIVING (ADL): ADLS_ACUITY_SCORE: 35

## 2024-09-30 NOTE — H&P
[facility]  Pre-Endoscopy History and Physical     Kristin Jimenez MRN# [id number]   YOB: 1955 Age: 69 year old     Date of Procedure: 9/30/2024  Primary care provider: Louis Nixon  Type of Endoscopy: colonoscopy  Reason for Procedure: screening  Type of Anesthesia Anticipated: Moderate Sedation    HPI:    Kristin is a 69 year old female who will be undergoing the above procedure.      A history and physical has been performed. The patient's medications and allergies have been reviewed. The risks and benefits of the procedure and the sedation options and risks were discussed with the patient.  All questions were answered and informed consent was obtained.      She denies a personal or family history of anesthesia complications or bleeding disorders.     Allergies   Allergen Reactions    Sulfa Antibiotics Rash     Rash        Prior to Admission Medications   Prescriptions Last Dose Informant Patient Reported? Taking?   FLUoxetine (PROZAC) 40 MG capsule Past Week  No Yes   Sig: Take 1 capsule (40 mg) by mouth daily   ezetimibe (ZETIA) 10 MG tablet Past Week  No Yes   Sig: Take 1 tablet (10 mg) by mouth daily   metroNIDAZOLE (METROGEL) 0.75 % external gel   No No   Sig: Apply topically 2 times daily   nitroFURantoin macrocrystal (MACRODANTIN) 50 MG capsule Past Week  No Yes   Sig: Take 1 capsule (50 mg) by mouth at bedtime   omeprazole (PRILOSEC) 20 MG DR capsule Past Week  No Yes   Sig: Take 1 capsule (20 mg) by mouth daily      Facility-Administered Medications: None       Patient Active Problem List   Diagnosis    Rosacea    Ulcerative colitis (H)    Major depressive disorder, single episode, moderate (H)    Osteopenia    Hyperlipidemia LDL goal <130    OA (osteoarthritis)    Family history of colon cancer    GERD (gastroesophageal reflux disease)    Colon polyp    Ulcerative rectosigmoiditis without complication (H)    Glaucoma    Class 1 obesity with serious comorbidity  and body mass index (BMI) of 33.0 to 33.9 in adult, unspecified obesity type        Past Medical History:   Diagnosis Date    Colon polyp     Family history of colon cancer     Mom    GERD (gastroesophageal reflux disease)     Glaucoma     [facility] - bilateral    Hyperlipidemia LDL goal <130     Major depressive disorder, single episode, moderate (H) 2009    OA (osteoarthritis) 2004    Lt hip moderate    Osteopenia 2009    Rosacea     Ulcerative colitis, unspecified 1985    UC - rare issues    Unspecified hemorrhoids without mention of complication 2007        Past Surgical History:   Procedure Laterality Date    COLONOSCOPY  2012    due 5 yrs - 4 mm polyp - FH - diverticulosis    COLONOSCOPY  2016    due 5 ytrs - FH    [facility] REVISE MEDIAN N/CARPAL TUNNEL SURG  2002    bilateral - dr acuna    SURGICAL HISTORY OF -   2007    Dr Perales - Lt hip CARMELINA    SURGICAL HISTORY OF -   2009    Dr Perales - Rt Hip CARMELINA    [facility] COLONOSCOPY THRU STOMA, DIAGNOSTIC  2012    colon polyp, diverticulosis, hemorrhoids - due 5-10 Yrs       Social History     Tobacco Use    Smoking status: Never    Smokeless tobacco: Never   Substance Use Topics    Alcohol use: Yes     Alcohol/week: 7.0 standard drinks of alcohol     Types: 7 Standard drinks or equivalent per week     Comment: 7 glass of wine per week       Family History   Problem Relation Age of Onset    Colon Cancer Mother     Eye Disorder Mother         glaucoma    Cancer - colorectal Mother         AGE 71 WITH METS liver cancer.  Oct. 2006    Lung Cancer Father          Lung CA - SMOKER    Cerebrovascular Disease Maternal Grandmother     C.A.D. Maternal Grandfather     Prostate Cancer Maternal Grandfather     Alzheimer Disease Paternal Grandmother     Prostate Cancer Paternal Grandfather     Lipids Brother     Macular Degeneration Brother     Eye Disorder Brother         histoplasmosis    Hyperlipidemia Brother      "Hyperlipidemia Brother     Lipids Sister     Eye Disorder Sister         histoplasmosis    Hyperlipidemia Sister     Hyperlipidemia Sister        REVIEW OF SYSTEMS:     5 point ROS negative except as noted above in HPI, including Gen., Resp., CV, GI &  system review.      PHYSICAL EXAM:   /77   Pulse 87   Resp 16   Ht 1.568 m (5' 1.75\")   Wt 82.6 kg (182 lb)   LMP 10/20/2003   SpO2 100%   BMI 33.56 kg/m   Estimated body mass index is 33.56 kg/m  as calculated from the following:    Height as of this encounter: 1.568 m (5' 1.75\").    Weight as of this encounter: 82.6 kg (182 lb).   GENERAL APPEARANCE: healthy, alert, and no distress  MENTAL STATUS: alert  AIRWAY EXAM: Mallampatti Class II (visualization of the soft palate, fauces, and uvula)  RESP: lungs clear to auscultation - no rales, rhonchi or wheezes  CV: regular rates and rhythm      DIAGNOSTICS:    Not indicated      IMPRESSION   ASA Class 2 - Mild systemic disease        PLAN:       Plan for colonoscopy. We discussed the risks, benefits and alternatives and the patient wished to proceed.    The above has been forwarded to the consulting provider.      Signed Electronically by: Rolly Cantrell MD,MD  September 30, 2024      [facility] [place]  Ph: [phone] Fax: [phone]     "

## 2024-10-02 LAB
PATH REPORT.COMMENTS IMP SPEC: NORMAL
PATH REPORT.COMMENTS IMP SPEC: NORMAL
PATH REPORT.FINAL DX SPEC: NORMAL
PATH REPORT.GROSS SPEC: NORMAL
PATH REPORT.MICROSCOPIC SPEC OTHER STN: NORMAL
PATH REPORT.RELEVANT HX SPEC: NORMAL
PHOTO IMAGE: NORMAL

## 2024-10-02 PROCEDURE — 88305 TISSUE EXAM BY PATHOLOGIST: CPT | Mod: 26

## 2024-10-14 ENCOUNTER — PATIENT OUTREACH (OUTPATIENT)
Dept: GASTROENTEROLOGY | Facility: CLINIC | Age: 69
End: 2024-10-14
Payer: COMMERCIAL

## 2025-02-12 NOTE — TELEPHONE ENCOUNTER
Office Visit       Date: 02/12/2025   Patient Name: Vanna Nicolas  MRN: [id number]  YOB: 1975    Referring Physician: No ref. provider found     Chief Complaint:   Chief Complaint   Patient presents with    Osteoarthritis    Rheumatoid Arthritis       History of Present Illness: Vanna Nicolas is a 49 y.o. female who is here today for her first time follow-up appointment.  She is a former Dr. Rodrigez patient.  She was historically diagnosed with rheumatoid arthritis by another rheumatologist.  She is off of DMARD therapy.  She takes Stelara with GI.  She has also been diagnosed with fibromyalgia and we prescribe her flexeril.      Subjective   Review of Systems:  Positive for activity change, excessive sweating, fatigue, dry mouth, blurred vision, eye discharge, dry eyes, leg swelling, anal bleeding, blood in stool, diarrhea, cold intolerance, heat intolerance, excessive thirst, joint pain, back pain, joint swelling, gait problems, foot pain, neck pain, neck stiffness, bruising, dry skin, hair loss, environmental allergies, headaches, lightheadedness, memory problems, numbness, weakness, agitation, behavioral problems, decreased concentration, depressed mood, dissatisfied mood, nervousness and anxiety, sleep disturbance and stress.  All other review of symptoms are negative.    Past Medical History:   Past Medical History:   Diagnosis Date    Anxiety and depression     Arthritis     Body piercing     EARS BILATERAL    Broken bones     ankle, wrist collar bone    Bursitis     Chest pain unsure of year    WITH EXCEDRINE TOO MUCH CAFFEINE.  LAST EPISODE WAS 2/2020.  PT WENT TO [facility]  STATED F/U WITH DR LEAL AND WAS TOLD THAT IT WAS DUE TO ACID REFLUX.     Diarrhea     Elevated cholesterol     Fibromyalgia     GERD (gastroesophageal reflux disease)     Hernia, umbilical     High cholesterol     Hip pain, acute, right     History of nuclear stress test 2014    Hypertension      [facility] pharmacy, pt reports some slight lower back/flank pain yesterday that has since disappeared. Advised to f/u with any change in sx for possible imaging.     The patient indicates understanding of these issues and agrees with the plan.    Lizeth Fuentes RN       IBS (irritable bowel syndrome)     Knee pain, bilateral     Malignant melanoma     stage 1, left leg    Migraines     OCD (obsessive compulsive disorder)     PTSD (post-traumatic stress disorder)     Sleep apnea     CPAP USAGE    Ulcerative colitis     Wears glasses        Past Surgical History:   Past Surgical History:   Procedure Laterality Date    ANKLE OPEN REDUCTION INTERNAL FIXATION Right 09/25/2020    Procedure: Right distal fibula open reduction internal fixation;  Surgeon: Danny Fuller MD;  Location: [facility] OR;  Service: Orthopedics;  Laterality: Right;    CARPAL TUNNEL RELEASE Right 01/10/2018    Procedure: ELECTIVE RIGHT CARPAL TUNNEL RELEASE;  Surgeon: Gus Ramos MD;  Location: [facility];  Service:     CARPAL TUNNEL RELEASE Left 04/18/2018    Procedure: ELECTIVE LEFT CARPAL TUNNEL RELEASE;  Surgeon: Gus Ramos MD;  Location: [facility];  Service: Orthopedics    COLONOSCOPY      ENDOSCOPY      HYSTERECTOMY      LAPAROSCOPIC TUBAL LIGATION      SKIN BIOPSY      STAGE 1 LEFT THIGH    SKIN CANCER EXCISION      TOTAL ABDOMINAL HYSTERECTOMY WITH SALPINGO OOPHORECTOMY  2004    ? Hormonal migraines    WISDOM TOOTH EXTRACTION         Family History:   Family History   Problem Relation Age of Onset    Rheum arthritis Mother     Ulcerative colitis Mother     Other (ulcerative colitis) Mother     COPD Father     Crohn's disease Sister     No Known Problems Maternal Aunt     No Known Problems Maternal Uncle     No Known Problems Paternal Aunt     No Known Problems Paternal Uncle     Kidney disease Maternal Grandmother     Alzheimer's disease Maternal Grandfather     No Known Problems Paternal Grandmother     No Known Problems Paternal Grandfather     Breast cancer Neg Hx     Ovarian cancer Neg Hx        Social History:   Social History     Socioeconomic History    Marital status: Single   Tobacco Use    Smoking status: Former     Current packs/day: 0.00     Average packs/day: 1 pack/day for 9.0 years  (9.0 ttl pk-yrs)     Types: Cigarettes     Start date: 2011     Quit date: 2020     Years since quittin.4     Passive exposure: Past    Smokeless tobacco: Never    Tobacco comments:     vaping, quit smoking 2020    Vaping Use    Vaping status: Every Day    Substances: Nicotine, Flavoring   Substance and Sexual Activity    Alcohol use: No    Drug use: No    Sexual activity: Defer       Medications:   Current Outpatient Medications:     amLODIPine (NORVASC) 5 MG tablet, , Disp: , Rfl:     atorvastatin (LIPITOR) 20 MG tablet, 2 tablets., Disp: , Rfl:     buPROPion XL (WELLBUTRIN XL) 150 MG 24 hr tablet, Take 1 tablet by mouth Daily., Disp: , Rfl:     busPIRone (BUSPAR) 30 MG tablet, Take 1 tablet by mouth Every 12 (Twelve) Hours., Disp: , Rfl:     Creon 07640-115034 units capsule delayed-release particles capsule, TAKE 2 CAPSULES BY MOUTH 3 TIMES A DAY AND 1 WITH SNACKS, Disp: , Rfl:     divalproex (DEPAKOTE) 500 MG 24 hr tablet, Take 1 tablet by mouth Every Night., Disp: 90 tablet, Rfl: 1    DULoxetine (CYMBALTA) 60 MG capsule, Take 1 capsule by mouth Daily., Disp: , Rfl:     folic acid (FOLVITE) 1 MG tablet, TAKE 1 TABLET BY MOUTH WITH FOOD, Disp: , Rfl:     furosemide (LASIX) 40 MG tablet, Take 1 tablet by mouth Daily., Disp: , Rfl:     glycopyrrolate (ROBINUL) 1 MG tablet, Take 1 tablet by mouth 3 (Three) Times a Day., Disp: , Rfl:     mesalamine (LIALDA) 1.2 g EC tablet, TAKE 2 TABLETS BY MOUTH ONE TIME A DAY, Disp: , Rfl: 1    metFORMIN (GLUCOPHAGE) 500 MG tablet, Take 1 tablet by mouth 2 (Two) Times a Day With Meals., Disp: , Rfl:     mirtazapine (REMERON) 45 MG tablet, Take 1 tablet by mouth every night at bedtime., Disp: , Rfl:     Ustekinumab (STELARA) 90 MG/ML solution prefilled syringe Injection, Inject  under the skin into the appropriate area as directed. Every 8 weeks, Disp: , Rfl:     baclofen (LIORESAL) 10 MG tablet, Take 1 tablet by mouth 3 (Three) Times a Day As Needed for Muscle  "Spasms., Disp: 90 tablet, Rfl: 5    losartan (Cozaar) 100 MG tablet, Take 1 tablet by mouth Daily., Disp: 90 tablet, Rfl: 3    Allergies:   Allergies   Allergen Reactions    Penicillins Unknown (See Comments)     'AS A BABY ALMOST PUT ME INTO A COMA'    Morphine And Codeine Nausea And Vomiting    Tetracyclines & Related Hives       I have reviewed and updated the patient's chief complaint, history of present illness, review of systems, past medical history, surgical history, family history, social history, medications and allergy list as appropriate.     Objective    Vital Signs:   Vitals:    02/12/25 1311   BP: 148/88   BP Location: Left arm   Patient Position: Sitting   Cuff Size: Adult   Pulse: 102   Temp: 98 °F (36.7 °C)   Weight: 110 kg (242 lb)   Height: 165.1 cm (65\")   PainSc:   7   PainLoc: Generalized     Body mass index is 40.27 kg/m².           Physical Exam:  Physical Exam  Vitals reviewed.   Constitutional:       Appearance: Normal appearance.   HENT:      Head: Normocephalic and atraumatic.      Mouth/Throat:      Mouth: Mucous membranes are moist.   Eyes:      Conjunctiva/sclera: Conjunctivae normal.   Cardiovascular:      Rate and Rhythm: Regular rhythm. Tachycardia present.      Pulses: Normal pulses.      Heart sounds: Normal heart sounds.   Pulmonary:      Effort: Pulmonary effort is normal.      Breath sounds: Normal breath sounds.   Musculoskeletal:         General: Normal range of motion.      Cervical back: Normal range of motion and neck supple.      Comments: No synovitis  Tender fingers, wrists  Diffuse Allodynia  Crepitus bilateral knees  Left hip tender with external rotation  but not internal rotation     Skin:     General: Skin is warm and dry.   Neurological:      General: No focal deficit present.      Mental Status: She is alert and oriented to person, place, and time. Mental status is at baseline.   Psychiatric:         Mood and Affect: Mood normal.         Behavior: Behavior " normal.         Thought Content: Thought content normal.         Judgment: Judgment normal.          Results Review:   Imaging Results (Last 24 Hours)       ** No results found for the last 24 hours. **            Procedures    Assessment / Plan    Assessment/Plan:   Diagnoses and all orders for this visit:    1. Rheumatoid arthritis with negative rheumatoid factor, involving unspecified site (Primary)  Assessment & Plan:  Significant tenderness and swelling of the MCPs, ankles, and MTPs were observed during the examination. Negative results were obtained for 14.3.3 eta, rheumatoid factor, CCP, and ELÍAS in November 2023.   September 2024: SSA and SSB negative.  Inflammatory markers within normal range.  20 factor x 3 negative.  September 20 24 foot x-ray:1.Bilateral plantar calcaneal spurs   2.Evidence for some degenerative change involving the first metatarsophalangeal joint spaces a little greater on the left.   3.Asymmetric soft tissue fat about the forefoot on the right as compared to the left of questionable significance   September 2024 cervical spine x-ray:  Vertebral body heights are maintained without evidence of acute fracture and alignment is anatomic without evidence of listhesis or subluxation. Multilevel spondylosis change is present, with areas of disc osteophyte complex formation and facet   arthropathy, most notable at C5-6 and C6-7. The prevertebral soft tissues are normal. The dens is intact.   September 2023 left hip x-ray:Lucency through the greater trochanter is seen on the frog lateral view only, possibly corticated. There is no definite acute osseous injury but could be further evaluated on MRI if there is clinical concern   Historically she has tried/failed plaquenil (did not work), sulfasalazine (unsure why she stopped) and methotrexate (did not help)    She is currently off any disease modifier therapy and can continue with Celebrex as she tolerates it with her colon disease.   We discussed  adding sulfasalazine back vs asking Dr. Jamison about stopping Stelara and using TNFi or Julisa.  She has remote history (1999) of melanoma.  No CHF, demyelinating brain disease, blood clots, heart attack, liver disease (aside from fatty liver) and diverticuli.  She does have elevated cholesterol.  Waiting to hear back from Dr. Jamison.    Orders:  -     MRI Hip Left With & Without Contrast; Future  -     CBC With Manual Differential; Future  -     Comprehensive Metabolic Panel; Future  -     C-reactive Protein; Future  -     Sedimentation Rate; Future  -     XR Hand 2 View Bilateral  -     XR Knee 1 or 2 View Bilateral  -     XR Shoulder 2+ View Bilateral; Future    2. History of melanoma  Assessment & Plan:  She is advised to use sunscreen aggressively, preferably with an SPF of 100       3. High risk medication use  Assessment & Plan:  She is currently on Stelara and Lialda for the treatment of her inflammatory bowel disease. Collaboration with GI is necessary to formulate a treatment plan.      4. Fibromyalgia  Assessment & Plan:  At last visit Flexeril was increased to 10mg TID.  No improvement.  Stop flexeril and trial baclofen 10mg tid prn muscle pain  Continue Duloxetine      5. Left hip pain  Assessment & Plan:  September 2023 left hip x-ray:Lucency through the greater trochanter is seen on the frog lateral view only, possibly corticated. There is no definite acute osseous injury.  September 2023 an MRI was ordered, but not completed    MRI ordered        Orders:  -     MRI Hip Left With & Without Contrast; Future    6. Osteoarthritis, unspecified osteoarthritis type, unspecified site  Assessment & Plan:  Feet.    Xray hands, shoulders and knees today.  She has chronic joint pain.    I spent 44 minutes total in patient care.  Patient care activities included reviewing and updating chart, ordering labs and MRI, answering questions, requesting consulting information from GI, performing history and physical  and prescribing medication.      Other orders  -     baclofen (LIORESAL) 10 MG tablet; Take 1 tablet by mouth 3 (Three) Times a Day As Needed for Muscle Spasms.  Dispense: 90 tablet; Refill: 5        Follow Up:   Return in about 4 months (around 6/12/2025) for AVA Harvey, AVA Coello.        AVA Dennison  [facility]

## 2025-02-15 DIAGNOSIS — K21.9 GASTROESOPHAGEAL REFLUX DISEASE WITHOUT ESOPHAGITIS: ICD-10-CM

## 2025-02-17 RX ORDER — OMEPRAZOLE 20 MG/1
20 CAPSULE, DELAYED RELEASE ORAL DAILY
Qty: 90 CAPSULE | Refills: 0 | Status: SHIPPED | OUTPATIENT
Start: 2025-02-17

## 2025-05-12 ENCOUNTER — PATIENT OUTREACH (OUTPATIENT)
Dept: CARE COORDINATION | Facility: CLINIC | Age: 70
End: 2025-05-12
Payer: COMMERCIAL

## 2025-05-19 DIAGNOSIS — K21.9 GASTROESOPHAGEAL REFLUX DISEASE WITHOUT ESOPHAGITIS: ICD-10-CM

## 2025-05-19 RX ORDER — OMEPRAZOLE 20 MG/1
20 CAPSULE, DELAYED RELEASE ORAL DAILY
Qty: 90 CAPSULE | Refills: 0 | Status: SHIPPED | OUTPATIENT
Start: 2025-05-19

## 2025-05-26 ENCOUNTER — PATIENT OUTREACH (OUTPATIENT)
Dept: CARE COORDINATION | Facility: CLINIC | Age: 70
End: 2025-05-26
Payer: COMMERCIAL

## 2025-07-26 ENCOUNTER — HEALTH MAINTENANCE LETTER (OUTPATIENT)
Age: 70
End: 2025-07-26

## 2025-08-04 DIAGNOSIS — F32.1 MAJOR DEPRESSIVE DISORDER, SINGLE EPISODE, MODERATE (H): ICD-10-CM

## 2025-08-04 RX ORDER — FLUOXETINE HYDROCHLORIDE 40 MG/1
40 CAPSULE ORAL DAILY
Qty: 90 CAPSULE | Refills: 0 | Status: SHIPPED | OUTPATIENT
Start: 2025-08-04

## 2025-08-12 ENCOUNTER — OFFICE VISIT (OUTPATIENT)
Dept: FAMILY MEDICINE | Facility: CLINIC | Age: 70
End: 2025-08-12
Payer: COMMERCIAL

## 2025-08-12 VITALS
HEIGHT: 62 IN | SYSTOLIC BLOOD PRESSURE: 110 MMHG | DIASTOLIC BLOOD PRESSURE: 71 MMHG | OXYGEN SATURATION: 100 % | HEART RATE: 93 BPM | BODY MASS INDEX: 35.33 KG/M2 | WEIGHT: 192 LBS | TEMPERATURE: 98.2 F | RESPIRATION RATE: 16 BRPM

## 2025-08-12 DIAGNOSIS — T63.441A BEE STING REACTION, ACCIDENTAL OR UNINTENTIONAL, INITIAL ENCOUNTER: Primary | ICD-10-CM

## 2025-08-12 PROCEDURE — 3074F SYST BP LT 130 MM HG: CPT | Performed by: NURSE PRACTITIONER

## 2025-08-12 PROCEDURE — 99213 OFFICE O/P EST LOW 20 MIN: CPT | Performed by: NURSE PRACTITIONER

## 2025-08-12 PROCEDURE — 3078F DIAST BP <80 MM HG: CPT | Performed by: NURSE PRACTITIONER

## 2025-08-12 RX ORDER — HYDROXYZINE HYDROCHLORIDE 25 MG/1
12.5-25 TABLET, FILM COATED ORAL 3 TIMES DAILY PRN
Qty: 20 TABLET | Refills: 0 | Status: SHIPPED | OUTPATIENT
Start: 2025-08-12

## 2025-08-12 RX ORDER — LORATADINE 10 MG/1
10 TABLET ORAL 2 TIMES DAILY
Qty: 14 TABLET | Refills: 0 | Status: SHIPPED | OUTPATIENT
Start: 2025-08-12 | End: 2025-08-19

## 2025-08-12 RX ORDER — CEPHALEXIN 500 MG/1
500 CAPSULE ORAL 3 TIMES DAILY
Qty: 15 CAPSULE | Refills: 0 | Status: SHIPPED | OUTPATIENT
Start: 2025-08-12 | End: 2025-08-17

## 2025-08-12 ASSESSMENT — ANXIETY QUESTIONNAIRES
GAD7 TOTAL SCORE: 0
IF YOU CHECKED OFF ANY PROBLEMS ON THIS QUESTIONNAIRE, HOW DIFFICULT HAVE THESE PROBLEMS MADE IT FOR YOU TO DO YOUR WORK, TAKE CARE OF THINGS AT HOME, OR GET ALONG WITH OTHER PEOPLE: NOT DIFFICULT AT ALL
GAD7 TOTAL SCORE: 0
6. BECOMING EASILY ANNOYED OR IRRITABLE: NOT AT ALL
7. FEELING AFRAID AS IF SOMETHING AWFUL MIGHT HAPPEN: NOT AT ALL
5. BEING SO RESTLESS THAT IT IS HARD TO SIT STILL: NOT AT ALL
3. WORRYING TOO MUCH ABOUT DIFFERENT THINGS: NOT AT ALL
4. TROUBLE RELAXING: NOT AT ALL
GAD7 TOTAL SCORE: 0
8. IF YOU CHECKED OFF ANY PROBLEMS, HOW DIFFICULT HAVE THESE MADE IT FOR YOU TO DO YOUR WORK, TAKE CARE OF THINGS AT HOME, OR GET ALONG WITH OTHER PEOPLE?: NOT DIFFICULT AT ALL
1. FEELING NERVOUS, ANXIOUS, OR ON EDGE: NOT AT ALL
2. NOT BEING ABLE TO STOP OR CONTROL WORRYING: NOT AT ALL
7. FEELING AFRAID AS IF SOMETHING AWFUL MIGHT HAPPEN: NOT AT ALL

## 2025-08-16 ENCOUNTER — HEALTH MAINTENANCE LETTER (OUTPATIENT)
Age: 70
End: 2025-08-16

## 2025-08-25 DIAGNOSIS — K21.9 GASTROESOPHAGEAL REFLUX DISEASE WITHOUT ESOPHAGITIS: ICD-10-CM

## 2025-08-25 RX ORDER — OMEPRAZOLE 20 MG/1
20 CAPSULE, DELAYED RELEASE ORAL DAILY
Qty: 90 CAPSULE | Refills: 0 | Status: SHIPPED | OUTPATIENT
Start: 2025-08-25

## (undated) DEVICE — ENDO TRAP POLYP QUICK CATCH 710201

## (undated) DEVICE — ENDO FORCEP SPIKED SERRATED SHAFT JUMBO 239CM G56998

## (undated) DEVICE — ENDO SNARE POLYPECTOMY OVAL 15MM LOOP SD-240U-15

## (undated) RX ORDER — ONDANSETRON 2 MG/ML
INJECTION INTRAMUSCULAR; INTRAVENOUS
Status: DISPENSED
Start: 2024-09-30

## (undated) RX ORDER — FENTANYL CITRATE 50 UG/ML
INJECTION, SOLUTION INTRAMUSCULAR; INTRAVENOUS
Status: DISPENSED
Start: 2024-09-30